# Patient Record
Sex: MALE | Race: WHITE | Employment: OTHER | URBAN - METROPOLITAN AREA
[De-identification: names, ages, dates, MRNs, and addresses within clinical notes are randomized per-mention and may not be internally consistent; named-entity substitution may affect disease eponyms.]

---

## 2017-12-02 PROBLEM — F17.200 TOBACCO DEPENDENCE: Status: ACTIVE | Noted: 2017-12-02

## 2017-12-02 PROBLEM — Z95.828 HX OF ENDOVASCULAR STENT GRAFT FOR ABDOMINAL AORTIC ANEURYSM: Status: ACTIVE | Noted: 2017-12-02

## 2017-12-02 PROBLEM — R09.89 BILATERAL CAROTID BRUITS: Status: ACTIVE | Noted: 2017-12-02

## 2017-12-02 PROBLEM — I70.209 FEMORAL-POPLITEAL ATHEROSCLEROSIS (HCC): Status: ACTIVE | Noted: 2017-12-02

## 2018-01-06 PROBLEM — N40.0 BPH (BENIGN PROSTATIC HYPERPLASIA): Status: ACTIVE | Noted: 2018-01-06

## 2018-01-10 PROBLEM — Z87.438 HISTORY OF BPH: Status: ACTIVE | Noted: 2018-01-10

## 2018-04-10 ENCOUNTER — HOSPITAL ENCOUNTER (INPATIENT)
Age: 78
LOS: 5 days | Discharge: HOME | DRG: 194 | End: 2018-04-15
Attending: EMERGENCY MEDICINE | Admitting: INTERNAL MEDICINE
Payer: MEDICARE

## 2018-04-10 ENCOUNTER — APPOINTMENT (OUTPATIENT)
Dept: CT IMAGING | Age: 78
DRG: 194 | End: 2018-04-10
Payer: MEDICARE

## 2018-04-10 ENCOUNTER — APPOINTMENT (OUTPATIENT)
Dept: GENERAL RADIOLOGY | Age: 78
DRG: 194 | End: 2018-04-10
Payer: MEDICARE

## 2018-04-10 DIAGNOSIS — J18.9 COMMUNITY ACQUIRED PNEUMONIA OF LEFT LOWER LOBE OF LUNG: Primary | ICD-10-CM

## 2018-04-10 DIAGNOSIS — J10.1 INFLUENZA B: ICD-10-CM

## 2018-04-10 LAB
ALBUMIN SERPL-MCNC: 3.8 G/DL (ref 3.5–5.2)
ALP BLD-CCNC: 90 U/L (ref 40–129)
ALT SERPL-CCNC: 13 U/L (ref 0–40)
ANION GAP SERPL CALCULATED.3IONS-SCNC: 15 MMOL/L (ref 7–16)
AST SERPL-CCNC: 18 U/L (ref 0–39)
BASOPHILS ABSOLUTE: 0.11 E9/L (ref 0–0.2)
BASOPHILS RELATIVE PERCENT: 0.5 % (ref 0–2)
BILIRUB SERPL-MCNC: 0.9 MG/DL (ref 0–1.2)
BUN BLDV-MCNC: 13 MG/DL (ref 8–23)
CALCIUM SERPL-MCNC: 9.2 MG/DL (ref 8.6–10.2)
CHLORIDE BLD-SCNC: 96 MMOL/L (ref 98–107)
CO2: 23 MMOL/L (ref 22–29)
CREAT SERPL-MCNC: 0.9 MG/DL (ref 0.7–1.2)
EOSINOPHILS ABSOLUTE: 0.03 E9/L (ref 0.05–0.5)
EOSINOPHILS RELATIVE PERCENT: 0.1 % (ref 0–6)
FILM ARRAY ADENOVIRUS: ABNORMAL
FILM ARRAY BORDETELLA PERTUSSIS: ABNORMAL
FILM ARRAY CHLAMYDOPHILIA PNEUMONIAE: ABNORMAL
FILM ARRAY CORONAVIRUS 229E: ABNORMAL
FILM ARRAY CORONAVIRUS HKU1: ABNORMAL
FILM ARRAY CORONAVIRUS NL63: ABNORMAL
FILM ARRAY CORONAVIRUS OC43: ABNORMAL
FILM ARRAY INFLUENZA A VIRUS 09H1: ABNORMAL
FILM ARRAY INFLUENZA A VIRUS H1: ABNORMAL
FILM ARRAY INFLUENZA A VIRUS H3: ABNORMAL
FILM ARRAY INFLUENZA A VIRUS: ABNORMAL
FILM ARRAY INFLUENZA B: ABNORMAL
FILM ARRAY MYCOPLASMA PNEUMONIAE: ABNORMAL
FILM ARRAY PARAINFLUENZA VIRUS 1: ABNORMAL
FILM ARRAY PARAINFLUENZA VIRUS 2: ABNORMAL
FILM ARRAY PARAINFLUENZA VIRUS 3: ABNORMAL
FILM ARRAY PARAINFLUENZA VIRUS 4: ABNORMAL
FILM ARRAY RESPIRATORY SYNCITIAL VIRUS: ABNORMAL
FILM ARRAY RHINOVIRUS/ENTEROVIRUS: ABNORMAL
GFR AFRICAN AMERICAN: >60
GFR NON-AFRICAN AMERICAN: >60 ML/MIN/1.73
GLUCOSE BLD-MCNC: 110 MG/DL (ref 74–109)
HCT VFR BLD CALC: 48.7 % (ref 37–54)
HEMOGLOBIN: 16.2 G/DL (ref 12.5–16.5)
IMMATURE GRANULOCYTES #: 0.15 E9/L
IMMATURE GRANULOCYTES %: 0.7 % (ref 0–5)
INFLUENZA A BY PCR: NOT DETECTED
INFLUENZA B BY PCR: DETECTED
LACTIC ACID: 0.9 MMOL/L (ref 0.5–2.2)
LACTIC ACID: 1.3 MMOL/L (ref 0.5–2.2)
LYMPHOCYTES ABSOLUTE: 1.8 E9/L (ref 1.5–4)
LYMPHOCYTES RELATIVE PERCENT: 8 % (ref 20–42)
MCH RBC QN AUTO: 29.1 PG (ref 26–35)
MCHC RBC AUTO-ENTMCNC: 33.3 % (ref 32–34.5)
MCV RBC AUTO: 87.4 FL (ref 80–99.9)
MONOCYTES ABSOLUTE: 1.25 E9/L (ref 0.1–0.95)
MONOCYTES RELATIVE PERCENT: 5.6 % (ref 2–12)
NEUTROPHILS ABSOLUTE: 19.06 E9/L (ref 1.8–7.3)
NEUTROPHILS RELATIVE PERCENT: 85.1 % (ref 43–80)
ORGANISM: ABNORMAL
PDW BLD-RTO: 14.9 FL (ref 11.5–15)
PLATELET # BLD: 245 E9/L (ref 130–450)
PMV BLD AUTO: 9.6 FL (ref 7–12)
POTASSIUM SERPL-SCNC: 4.4 MMOL/L (ref 3.5–5)
PRO-BNP: 309 PG/ML (ref 0–450)
RBC # BLD: 5.57 E12/L (ref 3.8–5.8)
SODIUM BLD-SCNC: 134 MMOL/L (ref 132–146)
TOTAL PROTEIN: 7.3 G/DL (ref 6.4–8.3)
TROPONIN: <0.01 NG/ML (ref 0–0.03)
WBC # BLD: 22.4 E9/L (ref 4.5–11.5)

## 2018-04-10 PROCEDURE — 6370000000 HC RX 637 (ALT 250 FOR IP): Performed by: INTERNAL MEDICINE

## 2018-04-10 PROCEDURE — 87503 INFLUENZA DNA AMP PROB ADDL: CPT

## 2018-04-10 PROCEDURE — 96365 THER/PROPH/DIAG IV INF INIT: CPT

## 2018-04-10 PROCEDURE — 71275 CT ANGIOGRAPHY CHEST: CPT

## 2018-04-10 PROCEDURE — 87502 INFLUENZA DNA AMP PROBE: CPT

## 2018-04-10 PROCEDURE — 80053 COMPREHEN METABOLIC PANEL: CPT

## 2018-04-10 PROCEDURE — 2140000000 HC CCU INTERMEDIATE R&B

## 2018-04-10 PROCEDURE — 6360000002 HC RX W HCPCS: Performed by: NURSE PRACTITIONER

## 2018-04-10 PROCEDURE — 6370000000 HC RX 637 (ALT 250 FOR IP): Performed by: NURSE PRACTITIONER

## 2018-04-10 PROCEDURE — 85025 COMPLETE CBC W/AUTO DIFF WBC: CPT

## 2018-04-10 PROCEDURE — 84484 ASSAY OF TROPONIN QUANT: CPT

## 2018-04-10 PROCEDURE — 71046 X-RAY EXAM CHEST 2 VIEWS: CPT

## 2018-04-10 PROCEDURE — 93005 ELECTROCARDIOGRAM TRACING: CPT | Performed by: PHYSICIAN ASSISTANT

## 2018-04-10 PROCEDURE — 96367 TX/PROPH/DG ADDL SEQ IV INF: CPT

## 2018-04-10 PROCEDURE — 83605 ASSAY OF LACTIC ACID: CPT

## 2018-04-10 PROCEDURE — 99285 EMERGENCY DEPT VISIT HI MDM: CPT

## 2018-04-10 PROCEDURE — 2580000003 HC RX 258: Performed by: NURSE PRACTITIONER

## 2018-04-10 PROCEDURE — 87581 M.PNEUMON DNA AMP PROBE: CPT

## 2018-04-10 PROCEDURE — 94664 DEMO&/EVAL PT USE INHALER: CPT

## 2018-04-10 PROCEDURE — 87040 BLOOD CULTURE FOR BACTERIA: CPT

## 2018-04-10 PROCEDURE — 36415 COLL VENOUS BLD VENIPUNCTURE: CPT

## 2018-04-10 PROCEDURE — 87501 INFLUENZA DNA AMP PROB 1+: CPT

## 2018-04-10 PROCEDURE — 87486 CHLMYD PNEUM DNA AMP PROBE: CPT

## 2018-04-10 PROCEDURE — 83880 ASSAY OF NATRIURETIC PEPTIDE: CPT

## 2018-04-10 PROCEDURE — 6360000004 HC RX CONTRAST MEDICATION: Performed by: RADIOLOGY

## 2018-04-10 PROCEDURE — 87798 DETECT AGENT NOS DNA AMP: CPT

## 2018-04-10 PROCEDURE — 2580000003 HC RX 258: Performed by: INTERNAL MEDICINE

## 2018-04-10 RX ORDER — IPRATROPIUM BROMIDE AND ALBUTEROL SULFATE 2.5; .5 MG/3ML; MG/3ML
1 SOLUTION RESPIRATORY (INHALATION) ONCE
Status: COMPLETED | OUTPATIENT
Start: 2018-04-10 | End: 2018-04-10

## 2018-04-10 RX ORDER — ACETAMINOPHEN 325 MG/1
650 TABLET ORAL EVERY 4 HOURS PRN
Status: DISCONTINUED | OUTPATIENT
Start: 2018-04-10 | End: 2018-04-15 | Stop reason: HOSPADM

## 2018-04-10 RX ORDER — ATORVASTATIN CALCIUM 10 MG/1
10 TABLET, FILM COATED ORAL NIGHTLY
Status: DISCONTINUED | OUTPATIENT
Start: 2018-04-10 | End: 2018-04-15 | Stop reason: HOSPADM

## 2018-04-10 RX ORDER — SODIUM CHLORIDE 0.9 % (FLUSH) 0.9 %
10 SYRINGE (ML) INJECTION EVERY 12 HOURS SCHEDULED
Status: DISCONTINUED | OUTPATIENT
Start: 2018-04-10 | End: 2018-04-15 | Stop reason: HOSPADM

## 2018-04-10 RX ORDER — CLOPIDOGREL BISULFATE 75 MG/1
75 TABLET ORAL DAILY
Status: DISCONTINUED | OUTPATIENT
Start: 2018-04-10 | End: 2018-04-15 | Stop reason: HOSPADM

## 2018-04-10 RX ORDER — OSELTAMIVIR PHOSPHATE 75 MG/1
75 CAPSULE ORAL ONCE
Status: COMPLETED | OUTPATIENT
Start: 2018-04-10 | End: 2018-04-10

## 2018-04-10 RX ORDER — OSELTAMIVIR PHOSPHATE 75 MG/1
75 CAPSULE ORAL 2 TIMES DAILY
Status: DISCONTINUED | OUTPATIENT
Start: 2018-04-11 | End: 2018-04-11

## 2018-04-10 RX ORDER — SODIUM CHLORIDE 0.9 % (FLUSH) 0.9 %
10 SYRINGE (ML) INJECTION PRN
Status: DISCONTINUED | OUTPATIENT
Start: 2018-04-10 | End: 2018-04-15 | Stop reason: HOSPADM

## 2018-04-10 RX ORDER — IPRATROPIUM BROMIDE AND ALBUTEROL SULFATE 2.5; .5 MG/3ML; MG/3ML
1 SOLUTION RESPIRATORY (INHALATION)
Status: DISCONTINUED | OUTPATIENT
Start: 2018-04-11 | End: 2018-04-15 | Stop reason: HOSPADM

## 2018-04-10 RX ORDER — CILOSTAZOL 100 MG/1
100 TABLET ORAL 2 TIMES DAILY
Status: DISCONTINUED | OUTPATIENT
Start: 2018-04-10 | End: 2018-04-15 | Stop reason: HOSPADM

## 2018-04-10 RX ORDER — ASPIRIN 81 MG/1
81 TABLET ORAL DAILY
Status: DISCONTINUED | OUTPATIENT
Start: 2018-04-11 | End: 2018-04-15 | Stop reason: HOSPADM

## 2018-04-10 RX ORDER — CLOPIDOGREL BISULFATE 75 MG/1
75 TABLET ORAL DAILY
COMMUNITY
End: 2018-06-07

## 2018-04-10 RX ORDER — ONDANSETRON 2 MG/ML
4 INJECTION INTRAMUSCULAR; INTRAVENOUS EVERY 6 HOURS PRN
Status: DISCONTINUED | OUTPATIENT
Start: 2018-04-10 | End: 2018-04-15 | Stop reason: HOSPADM

## 2018-04-10 RX ORDER — SODIUM CHLORIDE 9 MG/ML
INJECTION, SOLUTION INTRAVENOUS CONTINUOUS
Status: DISCONTINUED | OUTPATIENT
Start: 2018-04-10 | End: 2018-04-13

## 2018-04-10 RX ORDER — CILOSTAZOL 100 MG/1
100 TABLET ORAL 2 TIMES DAILY
COMMUNITY
End: 2018-06-07

## 2018-04-10 RX ADMIN — SODIUM CHLORIDE: 9 INJECTION, SOLUTION INTRAVENOUS at 21:39

## 2018-04-10 RX ADMIN — CILOSTAZOL 100 MG: 100 TABLET ORAL at 21:39

## 2018-04-10 RX ADMIN — CEFTRIAXONE SODIUM 1 G: 1 INJECTION, POWDER, FOR SOLUTION INTRAMUSCULAR; INTRAVENOUS at 15:50

## 2018-04-10 RX ADMIN — AZITHROMYCIN MONOHYDRATE 500 MG: 500 INJECTION, POWDER, LYOPHILIZED, FOR SOLUTION INTRAVENOUS at 16:48

## 2018-04-10 RX ADMIN — SODIUM CHLORIDE 1000 ML: 9 INJECTION, SOLUTION INTRAVENOUS at 15:40

## 2018-04-10 RX ADMIN — IPRATROPIUM BROMIDE AND ALBUTEROL SULFATE 1 AMPULE: 2.5; .5 SOLUTION RESPIRATORY (INHALATION) at 15:45

## 2018-04-10 RX ADMIN — Medication 10 ML: at 21:39

## 2018-04-10 RX ADMIN — ATORVASTATIN CALCIUM 10 MG: 10 TABLET, FILM COATED ORAL at 21:39

## 2018-04-10 RX ADMIN — IOPAMIDOL 60 ML: 755 INJECTION, SOLUTION INTRAVENOUS at 16:31

## 2018-04-10 RX ADMIN — OSELTAMIVIR PHOSPHATE 75 MG: 75 CAPSULE ORAL at 16:53

## 2018-04-10 RX ADMIN — CLOPIDOGREL 75 MG: 75 TABLET, FILM COATED ORAL at 21:39

## 2018-04-10 ASSESSMENT — PAIN SCALES - GENERAL: PAINLEVEL_OUTOF10: 5

## 2018-04-10 ASSESSMENT — PAIN DESCRIPTION - ORIENTATION: ORIENTATION: RIGHT;LEFT

## 2018-04-10 ASSESSMENT — PAIN DESCRIPTION - LOCATION: LOCATION: ABDOMEN

## 2018-04-11 PROBLEM — F17.200 TOBACCO DEPENDENCE: Chronic | Status: ACTIVE | Noted: 2017-12-02

## 2018-04-11 PROBLEM — R09.89 BILATERAL CAROTID BRUITS: Status: RESOLVED | Noted: 2017-12-02 | Resolved: 2018-04-11

## 2018-04-11 PROBLEM — I71.20 THORACIC AORTIC ANEURYSM: Status: ACTIVE | Noted: 2018-04-11

## 2018-04-11 PROBLEM — J10.1 INFLUENZA B: Status: ACTIVE | Noted: 2018-04-11

## 2018-04-11 PROBLEM — J44.9 COPD (CHRONIC OBSTRUCTIVE PULMONARY DISEASE) (HCC): Chronic | Status: ACTIVE | Noted: 2018-04-11

## 2018-04-11 PROBLEM — B34.8 INFECTION DUE TO HUMAN METAPNEUMOVIRUS (HMPV): Status: ACTIVE | Noted: 2018-04-11

## 2018-04-11 PROBLEM — E78.5 HYPERLIPIDEMIA: Chronic | Status: ACTIVE | Noted: 2018-04-11

## 2018-04-11 LAB
CEA: 2.7 NG/ML (ref 0–5.2)
EKG ATRIAL RATE: 107 BPM
EKG P AXIS: 36 DEGREES
EKG P-R INTERVAL: 154 MS
EKG Q-T INTERVAL: 346 MS
EKG QRS DURATION: 84 MS
EKG QTC CALCULATION (BAZETT): 461 MS
EKG R AXIS: -51 DEGREES
EKG T AXIS: 28 DEGREES
EKG VENTRICULAR RATE: 107 BPM

## 2018-04-11 PROCEDURE — 2580000003 HC RX 258: Performed by: INTERNAL MEDICINE

## 2018-04-11 PROCEDURE — 94640 AIRWAY INHALATION TREATMENT: CPT

## 2018-04-11 PROCEDURE — 2700000000 HC OXYGEN THERAPY PER DAY

## 2018-04-11 PROCEDURE — 99222 1ST HOSP IP/OBS MODERATE 55: CPT | Performed by: THORACIC SURGERY (CARDIOTHORACIC VASCULAR SURGERY)

## 2018-04-11 PROCEDURE — 6370000000 HC RX 637 (ALT 250 FOR IP): Performed by: INTERNAL MEDICINE

## 2018-04-11 PROCEDURE — 87450 HC DIRECT STREP B ANTIGEN: CPT

## 2018-04-11 PROCEDURE — 6360000002 HC RX W HCPCS: Performed by: INTERNAL MEDICINE

## 2018-04-11 PROCEDURE — 93010 ELECTROCARDIOGRAM REPORT: CPT | Performed by: INTERNAL MEDICINE

## 2018-04-11 PROCEDURE — 93005 ELECTROCARDIOGRAM TRACING: CPT | Performed by: INTERNAL MEDICINE

## 2018-04-11 PROCEDURE — 2140000000 HC CCU INTERMEDIATE R&B

## 2018-04-11 PROCEDURE — 87070 CULTURE OTHR SPECIMN AEROBIC: CPT

## 2018-04-11 PROCEDURE — 82378 CARCINOEMBRYONIC ANTIGEN: CPT

## 2018-04-11 PROCEDURE — 87205 SMEAR GRAM STAIN: CPT

## 2018-04-11 PROCEDURE — 36415 COLL VENOUS BLD VENIPUNCTURE: CPT

## 2018-04-11 RX ORDER — OSELTAMIVIR PHOSPHATE 75 MG/1
75 CAPSULE ORAL 2 TIMES DAILY
Status: DISCONTINUED | OUTPATIENT
Start: 2018-04-11 | End: 2018-04-15 | Stop reason: HOSPADM

## 2018-04-11 RX ORDER — METHYLPREDNISOLONE SODIUM SUCCINATE 40 MG/ML
40 INJECTION, POWDER, LYOPHILIZED, FOR SOLUTION INTRAMUSCULAR; INTRAVENOUS EVERY 6 HOURS
Status: DISCONTINUED | OUTPATIENT
Start: 2018-04-11 | End: 2018-04-12

## 2018-04-11 RX ADMIN — METHYLPREDNISOLONE SODIUM SUCCINATE 40 MG: 40 INJECTION, POWDER, FOR SOLUTION INTRAMUSCULAR; INTRAVENOUS at 12:07

## 2018-04-11 RX ADMIN — ATORVASTATIN CALCIUM 10 MG: 10 TABLET, FILM COATED ORAL at 20:59

## 2018-04-11 RX ADMIN — ENOXAPARIN SODIUM 90 MG: 100 INJECTION SUBCUTANEOUS at 21:10

## 2018-04-11 RX ADMIN — CEFTRIAXONE SODIUM 1 G: 1 INJECTION, POWDER, FOR SOLUTION INTRAMUSCULAR; INTRAVENOUS at 09:03

## 2018-04-11 RX ADMIN — CILOSTAZOL 100 MG: 100 TABLET ORAL at 20:59

## 2018-04-11 RX ADMIN — IPRATROPIUM BROMIDE AND ALBUTEROL SULFATE 1 AMPULE: 2.5; .5 SOLUTION RESPIRATORY (INHALATION) at 21:30

## 2018-04-11 RX ADMIN — ENOXAPARIN SODIUM 40 MG: 40 INJECTION SUBCUTANEOUS at 09:50

## 2018-04-11 RX ADMIN — OSELTAMIVIR PHOSPHATE 75 MG: 75 CAPSULE ORAL at 09:48

## 2018-04-11 RX ADMIN — AZITHROMYCIN MONOHYDRATE 500 MG: 500 INJECTION, POWDER, LYOPHILIZED, FOR SOLUTION INTRAVENOUS at 10:54

## 2018-04-11 RX ADMIN — OSELTAMIVIR PHOSPHATE 75 MG: 75 CAPSULE ORAL at 20:59

## 2018-04-11 RX ADMIN — CLOPIDOGREL 75 MG: 75 TABLET, FILM COATED ORAL at 09:49

## 2018-04-11 RX ADMIN — Medication 10 ML: at 20:59

## 2018-04-11 RX ADMIN — IPRATROPIUM BROMIDE AND ALBUTEROL SULFATE 1 AMPULE: 2.5; .5 SOLUTION RESPIRATORY (INHALATION) at 16:02

## 2018-04-11 RX ADMIN — ASPIRIN 81 MG: 81 TABLET, COATED ORAL at 09:48

## 2018-04-11 RX ADMIN — METHYLPREDNISOLONE SODIUM SUCCINATE 40 MG: 40 INJECTION, POWDER, FOR SOLUTION INTRAMUSCULAR; INTRAVENOUS at 19:05

## 2018-04-11 RX ADMIN — Medication 10 ML: at 23:01

## 2018-04-11 RX ADMIN — CILOSTAZOL 100 MG: 100 TABLET ORAL at 09:50

## 2018-04-11 RX ADMIN — SODIUM CHLORIDE: 9 INJECTION, SOLUTION INTRAVENOUS at 11:06

## 2018-04-11 ASSESSMENT — PAIN SCALES - GENERAL
PAINLEVEL_OUTOF10: 0

## 2018-04-12 ENCOUNTER — APPOINTMENT (OUTPATIENT)
Dept: ULTRASOUND IMAGING | Age: 78
DRG: 194 | End: 2018-04-12
Payer: MEDICARE

## 2018-04-12 ENCOUNTER — APPOINTMENT (OUTPATIENT)
Dept: GENERAL RADIOLOGY | Age: 78
DRG: 194 | End: 2018-04-12
Payer: MEDICARE

## 2018-04-12 PROBLEM — I48.91 ATRIAL FIBRILLATION WITH RVR (HCC): Status: ACTIVE | Noted: 2018-04-12

## 2018-04-12 LAB
AMYLASE FLUID: 16 U/L
ANION GAP SERPL CALCULATED.3IONS-SCNC: 15 MMOL/L (ref 7–16)
BUN BLDV-MCNC: 15 MG/DL (ref 8–23)
CALCIUM SERPL-MCNC: 8.9 MG/DL (ref 8.6–10.2)
CEA,FLUID: 2 NG/ML
CHLORIDE BLD-SCNC: 102 MMOL/L (ref 98–107)
CO2: 25 MMOL/L (ref 22–29)
CREAT SERPL-MCNC: 0.7 MG/DL (ref 0.7–1.2)
CRITICAL: NORMAL
DATE ANALYZED: NORMAL
DATE OF COLLECTION: NORMAL
EKG ATRIAL RATE: 103 BPM
EKG P AXIS: 47 DEGREES
EKG P-R INTERVAL: 144 MS
EKG Q-T INTERVAL: 362 MS
EKG QRS DURATION: 92 MS
EKG QTC CALCULATION (BAZETT): 474 MS
EKG R AXIS: -35 DEGREES
EKG T AXIS: 47 DEGREES
EKG VENTRICULAR RATE: 103 BPM
FLUID TYPE: NORMAL
FLUID TYPE: NORMAL
GFR AFRICAN AMERICAN: >60
GFR NON-AFRICAN AMERICAN: >60 ML/MIN/1.73
GLUCOSE BLD-MCNC: 142 MG/DL (ref 74–109)
GLUCOSE, FLUID: 128 MG/DL
L. PNEUMOPHILA SEROGP 1 UR AG: NORMAL
LAB: NORMAL
LD, FLUID: 586 U/L
LV EF: 70 %
LVEF MODALITY: NORMAL
Lab: 950
MAGNESIUM: 2.1 MG/DL (ref 1.6–2.6)
OPERATOR ID: 2156
PH BLOOD GAS: 7.37
PH, BODY FLUID: 7.41
POTASSIUM SERPL-SCNC: 3.6 MMOL/L (ref 3.5–5)
PROTEIN FLUID: 4.2 G/DL
SODIUM BLD-SCNC: 142 MMOL/L (ref 132–146)
SOURCE, BLOOD GAS: NORMAL
STREP PNEUMONIAE ANTIGEN, URINE: NORMAL
TIME ANALYZED: 1009
TRIGLYCERIDES FLUID: 39 MG/DL
TSH SERPL DL<=0.05 MIU/L-ACNC: 0.52 UIU/ML (ref 0.27–4.2)

## 2018-04-12 PROCEDURE — 36415 COLL VENOUS BLD VENIPUNCTURE: CPT

## 2018-04-12 PROCEDURE — 84478 ASSAY OF TRIGLYCERIDES: CPT

## 2018-04-12 PROCEDURE — 6370000000 HC RX 637 (ALT 250 FOR IP): Performed by: INTERNAL MEDICINE

## 2018-04-12 PROCEDURE — 2140000000 HC CCU INTERMEDIATE R&B

## 2018-04-12 PROCEDURE — 93005 ELECTROCARDIOGRAM TRACING: CPT | Performed by: INTERNAL MEDICINE

## 2018-04-12 PROCEDURE — 2580000003 HC RX 258: Performed by: INTERNAL MEDICINE

## 2018-04-12 PROCEDURE — 82150 ASSAY OF AMYLASE: CPT

## 2018-04-12 PROCEDURE — 84439 ASSAY OF FREE THYROXINE: CPT

## 2018-04-12 PROCEDURE — 82378 CARCINOEMBRYONIC ANTIGEN: CPT

## 2018-04-12 PROCEDURE — 2700000000 HC OXYGEN THERAPY PER DAY

## 2018-04-12 PROCEDURE — 6360000002 HC RX W HCPCS: Performed by: INTERNAL MEDICINE

## 2018-04-12 PROCEDURE — 32555 ASPIRATE PLEURA W/ IMAGING: CPT

## 2018-04-12 PROCEDURE — 0W9B3ZX DRAINAGE OF LEFT PLEURAL CAVITY, PERCUTANEOUS APPROACH, DIAGNOSTIC: ICD-10-PCS | Performed by: INTERNAL MEDICINE

## 2018-04-12 PROCEDURE — 99223 1ST HOSP IP/OBS HIGH 75: CPT | Performed by: INTERNAL MEDICINE

## 2018-04-12 PROCEDURE — 83615 LACTATE (LD) (LDH) ENZYME: CPT

## 2018-04-12 PROCEDURE — 83986 ASSAY PH BODY FLUID NOS: CPT

## 2018-04-12 PROCEDURE — 2500000003 HC RX 250 WO HCPCS

## 2018-04-12 PROCEDURE — 82947 ASSAY GLUCOSE BLOOD QUANT: CPT

## 2018-04-12 PROCEDURE — 84443 ASSAY THYROID STIM HORMONE: CPT

## 2018-04-12 PROCEDURE — 94640 AIRWAY INHALATION TREATMENT: CPT

## 2018-04-12 PROCEDURE — 71046 X-RAY EXAM CHEST 2 VIEWS: CPT

## 2018-04-12 PROCEDURE — 80048 BASIC METABOLIC PNL TOTAL CA: CPT

## 2018-04-12 PROCEDURE — 93306 TTE W/DOPPLER COMPLETE: CPT

## 2018-04-12 PROCEDURE — 88112 CYTOPATH CELL ENHANCE TECH: CPT

## 2018-04-12 PROCEDURE — 88305 TISSUE EXAM BY PATHOLOGIST: CPT

## 2018-04-12 PROCEDURE — 83735 ASSAY OF MAGNESIUM: CPT

## 2018-04-12 PROCEDURE — 93010 ELECTROCARDIOGRAM REPORT: CPT | Performed by: INTERNAL MEDICINE

## 2018-04-12 PROCEDURE — APPSS60 APP SPLIT SHARED TIME 46-60 MINUTES: Performed by: NURSE PRACTITIONER

## 2018-04-12 PROCEDURE — 84157 ASSAY OF PROTEIN OTHER: CPT

## 2018-04-12 RX ORDER — LIDOCAINE HYDROCHLORIDE 10 MG/ML
INJECTION, SOLUTION EPIDURAL; INFILTRATION; INTRACAUDAL; PERINEURAL
Status: COMPLETED
Start: 2018-04-12 | End: 2018-04-12

## 2018-04-12 RX ORDER — METHYLPREDNISOLONE SODIUM SUCCINATE 40 MG/ML
40 INJECTION, POWDER, LYOPHILIZED, FOR SOLUTION INTRAMUSCULAR; INTRAVENOUS EVERY 12 HOURS
Status: COMPLETED | OUTPATIENT
Start: 2018-04-12 | End: 2018-04-13

## 2018-04-12 RX ADMIN — IPRATROPIUM BROMIDE AND ALBUTEROL SULFATE 1 AMPULE: 2.5; .5 SOLUTION RESPIRATORY (INHALATION) at 12:24

## 2018-04-12 RX ADMIN — OSELTAMIVIR PHOSPHATE 75 MG: 75 CAPSULE ORAL at 10:01

## 2018-04-12 RX ADMIN — SODIUM CHLORIDE: 9 INJECTION, SOLUTION INTRAVENOUS at 01:00

## 2018-04-12 RX ADMIN — CILOSTAZOL 100 MG: 100 TABLET ORAL at 10:20

## 2018-04-12 RX ADMIN — CLOPIDOGREL 75 MG: 75 TABLET, FILM COATED ORAL at 10:20

## 2018-04-12 RX ADMIN — CILOSTAZOL 100 MG: 100 TABLET ORAL at 22:20

## 2018-04-12 RX ADMIN — ATORVASTATIN CALCIUM 10 MG: 10 TABLET, FILM COATED ORAL at 22:20

## 2018-04-12 RX ADMIN — IPRATROPIUM BROMIDE AND ALBUTEROL SULFATE 1 AMPULE: 2.5; .5 SOLUTION RESPIRATORY (INHALATION) at 20:07

## 2018-04-12 RX ADMIN — OSELTAMIVIR PHOSPHATE 75 MG: 75 CAPSULE ORAL at 22:20

## 2018-04-12 RX ADMIN — METHYLPREDNISOLONE SODIUM SUCCINATE 40 MG: 40 INJECTION, POWDER, FOR SOLUTION INTRAMUSCULAR; INTRAVENOUS at 01:00

## 2018-04-12 RX ADMIN — LIDOCAINE HYDROCHLORIDE: 10 INJECTION, SOLUTION EPIDURAL; INFILTRATION; INTRACAUDAL; PERINEURAL at 10:01

## 2018-04-12 RX ADMIN — CEFTRIAXONE SODIUM 1 G: 1 INJECTION, POWDER, FOR SOLUTION INTRAMUSCULAR; INTRAVENOUS at 08:51

## 2018-04-12 RX ADMIN — SODIUM CHLORIDE: 9 INJECTION, SOLUTION INTRAVENOUS at 14:45

## 2018-04-12 RX ADMIN — METHYLPREDNISOLONE SODIUM SUCCINATE 40 MG: 40 INJECTION, POWDER, LYOPHILIZED, FOR SOLUTION INTRAMUSCULAR; INTRAVENOUS at 18:42

## 2018-04-12 RX ADMIN — METHYLPREDNISOLONE SODIUM SUCCINATE 40 MG: 40 INJECTION, POWDER, FOR SOLUTION INTRAMUSCULAR; INTRAVENOUS at 04:58

## 2018-04-12 RX ADMIN — MAGNESIUM HYDROXIDE 30 ML: 400 SUSPENSION ORAL at 22:22

## 2018-04-12 RX ADMIN — ASPIRIN 81 MG: 81 TABLET, COATED ORAL at 10:20

## 2018-04-12 RX ADMIN — IPRATROPIUM BROMIDE AND ALBUTEROL SULFATE 1 AMPULE: 2.5; .5 SOLUTION RESPIRATORY (INHALATION) at 15:53

## 2018-04-12 RX ADMIN — Medication 10 ML: at 18:43

## 2018-04-12 RX ADMIN — AZITHROMYCIN MONOHYDRATE 500 MG: 500 INJECTION, POWDER, LYOPHILIZED, FOR SOLUTION INTRAVENOUS at 11:11

## 2018-04-12 ASSESSMENT — PAIN SCALES - GENERAL
PAINLEVEL_OUTOF10: 0

## 2018-04-13 LAB — T4 FREE: 1.25 NG/DL (ref 0.93–1.7)

## 2018-04-13 PROCEDURE — 6370000000 HC RX 637 (ALT 250 FOR IP): Performed by: INTERNAL MEDICINE

## 2018-04-13 PROCEDURE — 99232 SBSQ HOSP IP/OBS MODERATE 35: CPT | Performed by: INTERNAL MEDICINE

## 2018-04-13 PROCEDURE — 2700000000 HC OXYGEN THERAPY PER DAY

## 2018-04-13 PROCEDURE — 6360000002 HC RX W HCPCS: Performed by: INTERNAL MEDICINE

## 2018-04-13 PROCEDURE — 2140000000 HC CCU INTERMEDIATE R&B

## 2018-04-13 PROCEDURE — 2580000003 HC RX 258: Performed by: INTERNAL MEDICINE

## 2018-04-13 PROCEDURE — 94640 AIRWAY INHALATION TREATMENT: CPT

## 2018-04-13 PROCEDURE — 2580000003 HC RX 258

## 2018-04-13 RX ORDER — PREDNISONE 10 MG/1
TABLET ORAL
Qty: 30 TABLET | Refills: 0 | Status: SHIPPED | OUTPATIENT
Start: 2018-04-14 | End: 2018-05-29 | Stop reason: ALTCHOICE

## 2018-04-13 RX ORDER — PREDNISONE 20 MG/1
40 TABLET ORAL DAILY
Status: DISCONTINUED | OUTPATIENT
Start: 2018-04-14 | End: 2018-04-15 | Stop reason: HOSPADM

## 2018-04-13 RX ORDER — DIGOXIN 0.25 MG/ML
500 INJECTION INTRAMUSCULAR; INTRAVENOUS ONCE
Status: COMPLETED | OUTPATIENT
Start: 2018-04-13 | End: 2018-04-13

## 2018-04-13 RX ORDER — PREDNISONE 10 MG/1
10 TABLET ORAL DAILY
Status: DISCONTINUED | OUTPATIENT
Start: 2018-04-23 | End: 2018-04-15 | Stop reason: HOSPADM

## 2018-04-13 RX ORDER — PREDNISONE 20 MG/1
20 TABLET ORAL DAILY
Status: DISCONTINUED | OUTPATIENT
Start: 2018-04-20 | End: 2018-04-15 | Stop reason: HOSPADM

## 2018-04-13 RX ORDER — DIGOXIN 0.25 MG/ML
250 INJECTION INTRAMUSCULAR; INTRAVENOUS ONCE
Status: DISCONTINUED | OUTPATIENT
Start: 2018-04-13 | End: 2018-04-13

## 2018-04-13 RX ADMIN — WATER 1 ML: 1 INJECTION INTRAMUSCULAR; INTRAVENOUS; SUBCUTANEOUS at 16:37

## 2018-04-13 RX ADMIN — DIGOXIN 500 MCG: 0.25 INJECTION INTRAMUSCULAR; INTRAVENOUS at 02:12

## 2018-04-13 RX ADMIN — ENOXAPARIN SODIUM 30 MG: 30 INJECTION SUBCUTANEOUS at 09:09

## 2018-04-13 RX ADMIN — IPRATROPIUM BROMIDE AND ALBUTEROL SULFATE 1 AMPULE: 2.5; .5 SOLUTION RESPIRATORY (INHALATION) at 16:42

## 2018-04-13 RX ADMIN — OSELTAMIVIR PHOSPHATE 75 MG: 75 CAPSULE ORAL at 09:08

## 2018-04-13 RX ADMIN — CILOSTAZOL 100 MG: 100 TABLET ORAL at 09:08

## 2018-04-13 RX ADMIN — Medication 10 ML: at 21:55

## 2018-04-13 RX ADMIN — ATORVASTATIN CALCIUM 10 MG: 10 TABLET, FILM COATED ORAL at 21:55

## 2018-04-13 RX ADMIN — Medication 20 ML: at 02:12

## 2018-04-13 RX ADMIN — METHYLPREDNISOLONE SODIUM SUCCINATE 40 MG: 40 INJECTION, POWDER, LYOPHILIZED, FOR SOLUTION INTRAMUSCULAR; INTRAVENOUS at 05:00

## 2018-04-13 RX ADMIN — AZITHROMYCIN MONOHYDRATE 500 MG: 500 INJECTION, POWDER, LYOPHILIZED, FOR SOLUTION INTRAVENOUS at 10:20

## 2018-04-13 RX ADMIN — OSELTAMIVIR PHOSPHATE 75 MG: 75 CAPSULE ORAL at 21:55

## 2018-04-13 RX ADMIN — IPRATROPIUM BROMIDE AND ALBUTEROL SULFATE 1 AMPULE: 2.5; .5 SOLUTION RESPIRATORY (INHALATION) at 13:27

## 2018-04-13 RX ADMIN — IPRATROPIUM BROMIDE AND ALBUTEROL SULFATE 1 AMPULE: 2.5; .5 SOLUTION RESPIRATORY (INHALATION) at 09:18

## 2018-04-13 RX ADMIN — ASPIRIN 81 MG: 81 TABLET, COATED ORAL at 09:08

## 2018-04-13 RX ADMIN — CILOSTAZOL 100 MG: 100 TABLET ORAL at 21:55

## 2018-04-13 RX ADMIN — SODIUM CHLORIDE: 9 INJECTION, SOLUTION INTRAVENOUS at 02:12

## 2018-04-13 RX ADMIN — CLOPIDOGREL 75 MG: 75 TABLET, FILM COATED ORAL at 09:07

## 2018-04-13 RX ADMIN — METHYLPREDNISOLONE SODIUM SUCCINATE 40 MG: 40 INJECTION, POWDER, LYOPHILIZED, FOR SOLUTION INTRAMUSCULAR; INTRAVENOUS at 16:36

## 2018-04-13 RX ADMIN — CEFTRIAXONE SODIUM 1 G: 1 INJECTION, POWDER, FOR SOLUTION INTRAMUSCULAR; INTRAVENOUS at 09:10

## 2018-04-13 RX ADMIN — Medication 10 ML: at 05:00

## 2018-04-13 RX ADMIN — Medication 10 ML: at 16:37

## 2018-04-13 ASSESSMENT — PAIN SCALES - GENERAL
PAINLEVEL_OUTOF10: 0

## 2018-04-14 LAB
BODY FLUID CULTURE, STERILE: NORMAL
GRAM STAIN RESULT: NORMAL

## 2018-04-14 PROCEDURE — 6360000002 HC RX W HCPCS: Performed by: INTERNAL MEDICINE

## 2018-04-14 PROCEDURE — 6370000000 HC RX 637 (ALT 250 FOR IP): Performed by: INTERNAL MEDICINE

## 2018-04-14 PROCEDURE — 94640 AIRWAY INHALATION TREATMENT: CPT

## 2018-04-14 PROCEDURE — 2700000000 HC OXYGEN THERAPY PER DAY

## 2018-04-14 PROCEDURE — 94760 N-INVAS EAR/PLS OXIMETRY 1: CPT

## 2018-04-14 PROCEDURE — 2580000003 HC RX 258: Performed by: INTERNAL MEDICINE

## 2018-04-14 PROCEDURE — 2140000000 HC CCU INTERMEDIATE R&B

## 2018-04-14 RX ORDER — CALCIUM CARBONATE 200(500)MG
500 TABLET,CHEWABLE ORAL 3 TIMES DAILY PRN
Status: DISCONTINUED | OUTPATIENT
Start: 2018-04-14 | End: 2018-04-15 | Stop reason: HOSPADM

## 2018-04-14 RX ADMIN — Medication 10 ML: at 20:14

## 2018-04-14 RX ADMIN — ASPIRIN 81 MG: 81 TABLET, COATED ORAL at 09:02

## 2018-04-14 RX ADMIN — ENOXAPARIN SODIUM 30 MG: 30 INJECTION SUBCUTANEOUS at 09:02

## 2018-04-14 RX ADMIN — IPRATROPIUM BROMIDE AND ALBUTEROL SULFATE 1 AMPULE: 2.5; .5 SOLUTION RESPIRATORY (INHALATION) at 08:00

## 2018-04-14 RX ADMIN — CILOSTAZOL 100 MG: 100 TABLET ORAL at 09:01

## 2018-04-14 RX ADMIN — PREDNISONE 40 MG: 20 TABLET ORAL at 09:01

## 2018-04-14 RX ADMIN — ATORVASTATIN CALCIUM 10 MG: 10 TABLET, FILM COATED ORAL at 20:14

## 2018-04-14 RX ADMIN — Medication 500 MG: at 20:27

## 2018-04-14 RX ADMIN — OSELTAMIVIR PHOSPHATE 75 MG: 75 CAPSULE ORAL at 20:14

## 2018-04-14 RX ADMIN — IPRATROPIUM BROMIDE AND ALBUTEROL SULFATE 1 AMPULE: 2.5; .5 SOLUTION RESPIRATORY (INHALATION) at 16:47

## 2018-04-14 RX ADMIN — CILOSTAZOL 100 MG: 100 TABLET ORAL at 20:14

## 2018-04-14 RX ADMIN — OSELTAMIVIR PHOSPHATE 75 MG: 75 CAPSULE ORAL at 09:01

## 2018-04-14 RX ADMIN — AZITHROMYCIN MONOHYDRATE 500 MG: 500 INJECTION, POWDER, LYOPHILIZED, FOR SOLUTION INTRAVENOUS at 10:16

## 2018-04-14 RX ADMIN — CEFTRIAXONE SODIUM 1 G: 1 INJECTION, POWDER, FOR SOLUTION INTRAMUSCULAR; INTRAVENOUS at 09:02

## 2018-04-14 RX ADMIN — IPRATROPIUM BROMIDE AND ALBUTEROL SULFATE 1 AMPULE: 2.5; .5 SOLUTION RESPIRATORY (INHALATION) at 11:50

## 2018-04-14 RX ADMIN — Medication 10 ML: at 09:02

## 2018-04-14 RX ADMIN — IPRATROPIUM BROMIDE AND ALBUTEROL SULFATE 1 AMPULE: 2.5; .5 SOLUTION RESPIRATORY (INHALATION) at 19:52

## 2018-04-14 RX ADMIN — CLOPIDOGREL 75 MG: 75 TABLET, FILM COATED ORAL at 09:01

## 2018-04-14 ASSESSMENT — PAIN SCALES - GENERAL
PAINLEVEL_OUTOF10: 0

## 2018-04-15 VITALS
SYSTOLIC BLOOD PRESSURE: 128 MMHG | BODY MASS INDEX: 29 KG/M2 | OXYGEN SATURATION: 94 % | WEIGHT: 195.8 LBS | DIASTOLIC BLOOD PRESSURE: 80 MMHG | HEART RATE: 87 BPM | HEIGHT: 69 IN | RESPIRATION RATE: 18 BRPM | TEMPERATURE: 98.2 F

## 2018-04-15 LAB
BLOOD CULTURE, ROUTINE: NORMAL
CULTURE, BLOOD 2: NORMAL

## 2018-04-15 PROCEDURE — 6360000002 HC RX W HCPCS: Performed by: INTERNAL MEDICINE

## 2018-04-15 PROCEDURE — 6370000000 HC RX 637 (ALT 250 FOR IP): Performed by: INTERNAL MEDICINE

## 2018-04-15 PROCEDURE — 2580000003 HC RX 258: Performed by: INTERNAL MEDICINE

## 2018-04-15 PROCEDURE — 94640 AIRWAY INHALATION TREATMENT: CPT

## 2018-04-15 RX ORDER — OSELTAMIVIR PHOSPHATE 75 MG/1
75 CAPSULE ORAL 2 TIMES DAILY
Qty: 4 CAPSULE | Refills: 0 | Status: SHIPPED | OUTPATIENT
Start: 2018-04-15 | End: 2018-04-17

## 2018-04-15 RX ORDER — AMOXICILLIN AND CLAVULANATE POTASSIUM 875; 125 MG/1; MG/1
1 TABLET, FILM COATED ORAL 2 TIMES DAILY
Qty: 14 TABLET | Refills: 0 | Status: SHIPPED | OUTPATIENT
Start: 2018-04-15 | End: 2018-04-22

## 2018-04-15 RX ADMIN — ASPIRIN 81 MG: 81 TABLET, COATED ORAL at 08:36

## 2018-04-15 RX ADMIN — CEFTRIAXONE SODIUM 1 G: 1 INJECTION, POWDER, FOR SOLUTION INTRAMUSCULAR; INTRAVENOUS at 08:36

## 2018-04-15 RX ADMIN — CLOPIDOGREL 75 MG: 75 TABLET, FILM COATED ORAL at 08:35

## 2018-04-15 RX ADMIN — CILOSTAZOL 100 MG: 100 TABLET ORAL at 08:35

## 2018-04-15 RX ADMIN — Medication 10 ML: at 08:36

## 2018-04-15 RX ADMIN — PREDNISONE 40 MG: 20 TABLET ORAL at 08:35

## 2018-04-15 RX ADMIN — AZITHROMYCIN MONOHYDRATE 500 MG: 500 INJECTION, POWDER, LYOPHILIZED, FOR SOLUTION INTRAVENOUS at 10:07

## 2018-04-15 RX ADMIN — IPRATROPIUM BROMIDE AND ALBUTEROL SULFATE 1 AMPULE: 2.5; .5 SOLUTION RESPIRATORY (INHALATION) at 07:49

## 2018-04-15 RX ADMIN — OSELTAMIVIR PHOSPHATE 75 MG: 75 CAPSULE ORAL at 08:35

## 2018-04-15 RX ADMIN — ENOXAPARIN SODIUM 30 MG: 30 INJECTION SUBCUTANEOUS at 08:36

## 2018-04-15 ASSESSMENT — PAIN SCALES - GENERAL
PAINLEVEL_OUTOF10: 0

## 2018-04-17 LAB
EKG ATRIAL RATE: 104 BPM
EKG P AXIS: 60 DEGREES
EKG P-R INTERVAL: 150 MS
EKG Q-T INTERVAL: 324 MS
EKG QRS DURATION: 80 MS
EKG QTC CALCULATION (BAZETT): 426 MS
EKG R AXIS: -136 DEGREES
EKG T AXIS: 25 DEGREES
EKG VENTRICULAR RATE: 104 BPM

## 2018-04-18 ENCOUNTER — TELEPHONE (OUTPATIENT)
Dept: CARDIOTHORACIC SURGERY | Age: 78
End: 2018-04-18

## 2018-05-11 ENCOUNTER — HOSPITAL ENCOUNTER (OUTPATIENT)
Age: 78
Discharge: HOME | End: 2018-05-13
Payer: MEDICARE

## 2018-05-11 PROCEDURE — 88112 CYTOPATH CELL ENHANCE TECH: CPT

## 2018-06-01 ENCOUNTER — HOSPITAL ENCOUNTER (OUTPATIENT)
Dept: CT IMAGING | Age: 78
Discharge: HOME | End: 2018-06-03
Payer: MEDICARE

## 2018-06-01 DIAGNOSIS — J90 PLEURAL EFFUSION: ICD-10-CM

## 2018-06-01 PROCEDURE — 71250 CT THORAX DX C-: CPT

## 2018-06-05 ENCOUNTER — PREP FOR PROCEDURE (OUTPATIENT)
Dept: CARDIOTHORACIC SURGERY | Age: 78
End: 2018-06-05

## 2018-06-05 ENCOUNTER — INITIAL CONSULT (OUTPATIENT)
Dept: CARDIOTHORACIC SURGERY | Age: 78
End: 2018-06-05
Payer: MEDICARE

## 2018-06-05 VITALS
BODY MASS INDEX: 29.4 KG/M2 | HEART RATE: 85 BPM | HEIGHT: 68 IN | SYSTOLIC BLOOD PRESSURE: 122 MMHG | DIASTOLIC BLOOD PRESSURE: 73 MMHG | WEIGHT: 194 LBS

## 2018-06-05 DIAGNOSIS — J90 RECURRENT LEFT PLEURAL EFFUSION: Primary | ICD-10-CM

## 2018-06-05 PROCEDURE — 99213 OFFICE O/P EST LOW 20 MIN: CPT | Performed by: THORACIC SURGERY (CARDIOTHORACIC VASCULAR SURGERY)

## 2018-06-05 RX ORDER — CHLORHEXIDINE GLUCONATE 0.12 MG/ML
15 RINSE ORAL ONCE
Status: CANCELLED | OUTPATIENT
Start: 2018-06-05 | End: 2018-06-05

## 2018-06-05 RX ORDER — SODIUM CHLORIDE 0.9 % (FLUSH) 0.9 %
10 SYRINGE (ML) INJECTION PRN
Status: CANCELLED | OUTPATIENT
Start: 2018-06-05

## 2018-06-05 RX ORDER — SODIUM CHLORIDE 9 MG/ML
INJECTION, SOLUTION INTRAVENOUS CONTINUOUS
Status: CANCELLED | OUTPATIENT
Start: 2018-06-05

## 2018-06-05 RX ORDER — SODIUM CHLORIDE 0.9 % (FLUSH) 0.9 %
10 SYRINGE (ML) INJECTION EVERY 12 HOURS SCHEDULED
Status: CANCELLED | OUTPATIENT
Start: 2018-06-05

## 2018-06-05 RX ORDER — CHLORHEXIDINE GLUCONATE 4 G/100ML
SOLUTION TOPICAL ONCE
Status: CANCELLED | OUTPATIENT
Start: 2018-06-05 | End: 2018-06-05

## 2018-06-05 ASSESSMENT — ENCOUNTER SYMPTOMS
SHORTNESS OF BREATH: 1
ABDOMINAL PAIN: 0

## 2018-06-06 ENCOUNTER — TELEPHONE (OUTPATIENT)
Dept: CARDIOTHORACIC SURGERY | Age: 78
End: 2018-06-06

## 2018-06-07 ENCOUNTER — HOSPITAL ENCOUNTER (OUTPATIENT)
Dept: PREADMISSION TESTING | Age: 78
Discharge: HOME | End: 2018-06-07
Payer: MEDICARE

## 2018-06-07 ENCOUNTER — ANESTHESIA EVENT (OUTPATIENT)
Dept: OPERATING ROOM | Age: 78
DRG: 168 | End: 2018-06-07
Payer: MEDICARE

## 2018-06-07 VITALS
DIASTOLIC BLOOD PRESSURE: 73 MMHG | OXYGEN SATURATION: 97 % | RESPIRATION RATE: 12 BRPM | SYSTOLIC BLOOD PRESSURE: 118 MMHG | HEART RATE: 87 BPM | HEIGHT: 69 IN | TEMPERATURE: 97.9 F | WEIGHT: 194 LBS | BODY MASS INDEX: 28.73 KG/M2

## 2018-06-07 DIAGNOSIS — J90 RECURRENT LEFT PLEURAL EFFUSION: ICD-10-CM

## 2018-06-07 LAB
ABO/RH: NORMAL
ANION GAP SERPL CALCULATED.3IONS-SCNC: 12 MMOL/L (ref 7–16)
ANTIBODY SCREEN: NORMAL
APTT: 30.5 SEC (ref 24.5–35.1)
BUN BLDV-MCNC: 11 MG/DL (ref 8–23)
CALCIUM SERPL-MCNC: 9.5 MG/DL (ref 8.6–10.2)
CHLORIDE BLD-SCNC: 102 MMOL/L (ref 98–107)
CO2: 26 MMOL/L (ref 22–29)
CREAT SERPL-MCNC: 0.7 MG/DL (ref 0.7–1.2)
GFR AFRICAN AMERICAN: >60
GFR NON-AFRICAN AMERICAN: >60 ML/MIN/1.73
GLUCOSE BLD-MCNC: 95 MG/DL (ref 74–109)
HCT VFR BLD CALC: 39.6 % (ref 37–54)
HEMOGLOBIN: 13 G/DL (ref 12.5–16.5)
INR BLD: 1.1
MCH RBC QN AUTO: 29.3 PG (ref 26–35)
MCHC RBC AUTO-ENTMCNC: 32.8 % (ref 32–34.5)
MCV RBC AUTO: 89.4 FL (ref 80–99.9)
PDW BLD-RTO: 15 FL (ref 11.5–15)
PLATELET # BLD: 285 E9/L (ref 130–450)
PMV BLD AUTO: 9.5 FL (ref 7–12)
POTASSIUM REFLEX MAGNESIUM: 4.7 MMOL/L (ref 3.5–5)
PROTHROMBIN TIME: 12.4 SEC (ref 9.3–12.4)
RBC # BLD: 4.43 E12/L (ref 3.8–5.8)
SODIUM BLD-SCNC: 140 MMOL/L (ref 132–146)
WBC # BLD: 10.1 E9/L (ref 4.5–11.5)

## 2018-06-07 PROCEDURE — 85610 PROTHROMBIN TIME: CPT

## 2018-06-07 PROCEDURE — 86923 COMPATIBILITY TEST ELECTRIC: CPT

## 2018-06-07 PROCEDURE — 80048 BASIC METABOLIC PNL TOTAL CA: CPT

## 2018-06-07 PROCEDURE — 36415 COLL VENOUS BLD VENIPUNCTURE: CPT

## 2018-06-07 PROCEDURE — 85027 COMPLETE CBC AUTOMATED: CPT

## 2018-06-07 PROCEDURE — 86900 BLOOD TYPING SEROLOGIC ABO: CPT

## 2018-06-07 PROCEDURE — 86901 BLOOD TYPING SEROLOGIC RH(D): CPT

## 2018-06-07 PROCEDURE — 85730 THROMBOPLASTIN TIME PARTIAL: CPT

## 2018-06-07 PROCEDURE — 86850 RBC ANTIBODY SCREEN: CPT

## 2018-06-07 PROCEDURE — 87081 CULTURE SCREEN ONLY: CPT

## 2018-06-07 RX ORDER — MULTIVIT WITH MINERALS/LUTEIN
1 TABLET ORAL DAILY
COMMUNITY

## 2018-06-08 ENCOUNTER — HOSPITAL ENCOUNTER (OUTPATIENT)
Dept: PREADMISSION TESTING | Age: 78
Discharge: HOME | End: 2018-06-08

## 2018-06-08 LAB — MRSA CULTURE ONLY: NORMAL

## 2018-06-11 ENCOUNTER — APPOINTMENT (OUTPATIENT)
Dept: GENERAL RADIOLOGY | Age: 78
DRG: 168 | End: 2018-06-11
Attending: THORACIC SURGERY (CARDIOTHORACIC VASCULAR SURGERY)
Payer: MEDICARE

## 2018-06-11 ENCOUNTER — ANESTHESIA (OUTPATIENT)
Dept: OPERATING ROOM | Age: 78
DRG: 168 | End: 2018-06-11
Payer: MEDICARE

## 2018-06-11 ENCOUNTER — HOSPITAL ENCOUNTER (INPATIENT)
Age: 78
LOS: 1 days | Discharge: HOME HEALTH | DRG: 168 | End: 2018-06-12
Attending: THORACIC SURGERY (CARDIOTHORACIC VASCULAR SURGERY) | Admitting: THORACIC SURGERY (CARDIOTHORACIC VASCULAR SURGERY)
Payer: MEDICARE

## 2018-06-11 VITALS — OXYGEN SATURATION: 91 % | RESPIRATION RATE: 17 BRPM | TEMPERATURE: 97.7 F

## 2018-06-11 DIAGNOSIS — G89.18 ACUTE POST-OPERATIVE PAIN: Primary | ICD-10-CM

## 2018-06-11 DIAGNOSIS — J90 RECURRENT LEFT PLEURAL EFFUSION: ICD-10-CM

## 2018-06-11 LAB
ANION GAP SERPL CALCULATED.3IONS-SCNC: 13 MMOL/L (ref 7–16)
BUN BLDV-MCNC: 7 MG/DL (ref 8–23)
CALCIUM SERPL-MCNC: 8.8 MG/DL (ref 8.6–10.2)
CHLORIDE BLD-SCNC: 104 MMOL/L (ref 98–107)
CO2: 25 MMOL/L (ref 22–29)
CREAT SERPL-MCNC: 0.7 MG/DL (ref 0.7–1.2)
GFR AFRICAN AMERICAN: >60
GFR NON-AFRICAN AMERICAN: >60 ML/MIN/1.73
GLUCOSE BLD-MCNC: 124 MG/DL (ref 74–109)
HCT VFR BLD CALC: 38.7 % (ref 37–54)
HEMOGLOBIN: 12.7 G/DL (ref 12.5–16.5)
MAGNESIUM: 2 MG/DL (ref 1.6–2.6)
MCH RBC QN AUTO: 29.1 PG (ref 26–35)
MCHC RBC AUTO-ENTMCNC: 32.8 % (ref 32–34.5)
MCV RBC AUTO: 88.8 FL (ref 80–99.9)
PDW BLD-RTO: 15 FL (ref 11.5–15)
PLATELET # BLD: 235 E9/L (ref 130–450)
PMV BLD AUTO: 9.2 FL (ref 7–12)
POTASSIUM SERPL-SCNC: 3.9 MMOL/L (ref 3.5–5)
RBC # BLD: 4.36 E12/L (ref 3.8–5.8)
SODIUM BLD-SCNC: 142 MMOL/L (ref 132–146)
WBC # BLD: 11.8 E9/L (ref 4.5–11.5)

## 2018-06-11 PROCEDURE — 87075 CULTR BACTERIA EXCEPT BLOOD: CPT

## 2018-06-11 PROCEDURE — 6370000000 HC RX 637 (ALT 250 FOR IP): Performed by: NURSE PRACTITIONER

## 2018-06-11 PROCEDURE — 3700000000 HC ANESTHESIA ATTENDED CARE: Performed by: THORACIC SURGERY (CARDIOTHORACIC VASCULAR SURGERY)

## 2018-06-11 PROCEDURE — 87070 CULTURE OTHR SPECIMN AEROBIC: CPT

## 2018-06-11 PROCEDURE — 32550 INSERT PLEURAL CATH: CPT | Performed by: THORACIC SURGERY (CARDIOTHORACIC VASCULAR SURGERY)

## 2018-06-11 PROCEDURE — 2140000000 HC CCU INTERMEDIATE R&B

## 2018-06-11 PROCEDURE — 80048 BASIC METABOLIC PNL TOTAL CA: CPT

## 2018-06-11 PROCEDURE — 7100000001 HC PACU RECOVERY - ADDTL 15 MIN: Performed by: THORACIC SURGERY (CARDIOTHORACIC VASCULAR SURGERY)

## 2018-06-11 PROCEDURE — 2500000003 HC RX 250 WO HCPCS: Performed by: THORACIC SURGERY (CARDIOTHORACIC VASCULAR SURGERY)

## 2018-06-11 PROCEDURE — 0BJ08ZZ INSPECTION OF TRACHEOBRONCHIAL TREE, VIA NATURAL OR ARTIFICIAL OPENING ENDOSCOPIC: ICD-10-PCS | Performed by: THORACIC SURGERY (CARDIOTHORACIC VASCULAR SURGERY)

## 2018-06-11 PROCEDURE — 0BBP4ZX EXCISION OF LEFT PLEURA, PERCUTANEOUS ENDOSCOPIC APPROACH, DIAGNOSTIC: ICD-10-PCS | Performed by: THORACIC SURGERY (CARDIOTHORACIC VASCULAR SURGERY)

## 2018-06-11 PROCEDURE — 3700000001 HC ADD 15 MINUTES (ANESTHESIA): Performed by: THORACIC SURGERY (CARDIOTHORACIC VASCULAR SURGERY)

## 2018-06-11 PROCEDURE — 0W9B40Z DRAINAGE OF LEFT PLEURAL CAVITY WITH DRAINAGE DEVICE, PERCUTANEOUS ENDOSCOPIC APPROACH: ICD-10-PCS | Performed by: THORACIC SURGERY (CARDIOTHORACIC VASCULAR SURGERY)

## 2018-06-11 PROCEDURE — 6360000002 HC RX W HCPCS

## 2018-06-11 PROCEDURE — 83735 ASSAY OF MAGNESIUM: CPT

## 2018-06-11 PROCEDURE — 87102 FUNGUS ISOLATION CULTURE: CPT

## 2018-06-11 PROCEDURE — 7100000000 HC PACU RECOVERY - FIRST 15 MIN: Performed by: THORACIC SURGERY (CARDIOTHORACIC VASCULAR SURGERY)

## 2018-06-11 PROCEDURE — 36620 INSERTION CATHETER ARTERY: CPT | Performed by: ANESTHESIOLOGY

## 2018-06-11 PROCEDURE — 2580000003 HC RX 258

## 2018-06-11 PROCEDURE — 2500000003 HC RX 250 WO HCPCS: Performed by: NURSE ANESTHETIST, CERTIFIED REGISTERED

## 2018-06-11 PROCEDURE — 71045 X-RAY EXAM CHEST 1 VIEW: CPT

## 2018-06-11 PROCEDURE — 6360000002 HC RX W HCPCS: Performed by: NURSE PRACTITIONER

## 2018-06-11 PROCEDURE — 2580000003 HC RX 258: Performed by: NURSE PRACTITIONER

## 2018-06-11 PROCEDURE — 6360000002 HC RX W HCPCS: Performed by: ANESTHESIOLOGY

## 2018-06-11 PROCEDURE — 3600000004 HC SURGERY LEVEL 4 BASE: Performed by: THORACIC SURGERY (CARDIOTHORACIC VASCULAR SURGERY)

## 2018-06-11 PROCEDURE — 2500000003 HC RX 250 WO HCPCS

## 2018-06-11 PROCEDURE — 36415 COLL VENOUS BLD VENIPUNCTURE: CPT

## 2018-06-11 PROCEDURE — 88305 TISSUE EXAM BY PATHOLOGIST: CPT

## 2018-06-11 PROCEDURE — 85027 COMPLETE CBC AUTOMATED: CPT

## 2018-06-11 PROCEDURE — 6360000002 HC RX W HCPCS: Performed by: INTERNAL MEDICINE

## 2018-06-11 PROCEDURE — 88112 CYTOPATH CELL ENHANCE TECH: CPT

## 2018-06-11 PROCEDURE — 6360000002 HC RX W HCPCS: Performed by: NURSE ANESTHETIST, CERTIFIED REGISTERED

## 2018-06-11 PROCEDURE — 87205 SMEAR GRAM STAIN: CPT

## 2018-06-11 PROCEDURE — 3600000014 HC SURGERY LEVEL 4 ADDTL 15MIN: Performed by: THORACIC SURGERY (CARDIOTHORACIC VASCULAR SURGERY)

## 2018-06-11 PROCEDURE — 6370000000 HC RX 637 (ALT 250 FOR IP): Performed by: INTERNAL MEDICINE

## 2018-06-11 PROCEDURE — 94664 DEMO&/EVAL PT USE INHALER: CPT

## 2018-06-11 PROCEDURE — 32609 THORACOSCOPY W/BX PLEURA: CPT | Performed by: THORACIC SURGERY (CARDIOTHORACIC VASCULAR SURGERY)

## 2018-06-11 PROCEDURE — 31622 DX BRONCHOSCOPE/WASH: CPT | Performed by: THORACIC SURGERY (CARDIOTHORACIC VASCULAR SURGERY)

## 2018-06-11 RX ORDER — SODIUM CHLORIDE 0.9 % (FLUSH) 0.9 %
10 SYRINGE (ML) INJECTION EVERY 12 HOURS SCHEDULED
Status: DISCONTINUED | OUTPATIENT
Start: 2018-06-11 | End: 2018-06-11 | Stop reason: HOSPADM

## 2018-06-11 RX ORDER — MORPHINE SULFATE 2 MG/ML
2 INJECTION, SOLUTION INTRAMUSCULAR; INTRAVENOUS EVERY 4 HOURS PRN
Status: DISCONTINUED | OUTPATIENT
Start: 2018-06-11 | End: 2018-06-11 | Stop reason: SDUPTHER

## 2018-06-11 RX ORDER — VECURONIUM BROMIDE 1 MG/ML
INJECTION, POWDER, LYOPHILIZED, FOR SOLUTION INTRAVENOUS PRN
Status: DISCONTINUED | OUTPATIENT
Start: 2018-06-11 | End: 2018-06-11 | Stop reason: SDUPTHER

## 2018-06-11 RX ORDER — ROCURONIUM BROMIDE 10 MG/ML
INJECTION, SOLUTION INTRAVENOUS PRN
Status: DISCONTINUED | OUTPATIENT
Start: 2018-06-11 | End: 2018-06-11

## 2018-06-11 RX ORDER — FORMOTEROL FUMARATE 20 UG/2ML
20 SOLUTION RESPIRATORY (INHALATION) EVERY 12 HOURS
Status: DISCONTINUED | OUTPATIENT
Start: 2018-06-11 | End: 2018-06-12 | Stop reason: HOSPADM

## 2018-06-11 RX ORDER — BUPIVACAINE HYDROCHLORIDE AND EPINEPHRINE 5; 5 MG/ML; UG/ML
INJECTION, SOLUTION EPIDURAL; INTRACAUDAL; PERINEURAL PRN
Status: DISCONTINUED | OUTPATIENT
Start: 2018-06-11 | End: 2018-06-11 | Stop reason: HOSPADM

## 2018-06-11 RX ORDER — SODIUM CHLORIDE 9 MG/ML
INJECTION, SOLUTION INTRAVENOUS CONTINUOUS
Status: DISCONTINUED | OUTPATIENT
Start: 2018-06-11 | End: 2018-06-11

## 2018-06-11 RX ORDER — SODIUM CHLORIDE 0.9 % (FLUSH) 0.9 %
10 SYRINGE (ML) INJECTION PRN
Status: DISCONTINUED | OUTPATIENT
Start: 2018-06-11 | End: 2018-06-12 | Stop reason: HOSPADM

## 2018-06-11 RX ORDER — MIDAZOLAM HYDROCHLORIDE 1 MG/ML
INJECTION INTRAMUSCULAR; INTRAVENOUS PRN
Status: DISCONTINUED | OUTPATIENT
Start: 2018-06-11 | End: 2018-06-11 | Stop reason: SDUPTHER

## 2018-06-11 RX ORDER — ONDANSETRON 2 MG/ML
4 INJECTION INTRAMUSCULAR; INTRAVENOUS EVERY 6 HOURS PRN
Status: DISCONTINUED | OUTPATIENT
Start: 2018-06-11 | End: 2018-06-12 | Stop reason: HOSPADM

## 2018-06-11 RX ORDER — SODIUM CHLORIDE 0.9 % (FLUSH) 0.9 %
10 SYRINGE (ML) INJECTION PRN
Status: DISCONTINUED | OUTPATIENT
Start: 2018-06-11 | End: 2018-06-11 | Stop reason: HOSPADM

## 2018-06-11 RX ORDER — PROPOFOL 10 MG/ML
INJECTION, EMULSION INTRAVENOUS PRN
Status: DISCONTINUED | OUTPATIENT
Start: 2018-06-11 | End: 2018-06-11 | Stop reason: SDUPTHER

## 2018-06-11 RX ORDER — MEPERIDINE HYDROCHLORIDE 50 MG/ML
12.5 INJECTION INTRAMUSCULAR; INTRAVENOUS; SUBCUTANEOUS EVERY 5 MIN PRN
Status: DISCONTINUED | OUTPATIENT
Start: 2018-06-11 | End: 2018-06-11 | Stop reason: HOSPADM

## 2018-06-11 RX ORDER — IPRATROPIUM BROMIDE AND ALBUTEROL SULFATE 2.5; .5 MG/3ML; MG/3ML
1 SOLUTION RESPIRATORY (INHALATION)
Status: DISCONTINUED | OUTPATIENT
Start: 2018-06-11 | End: 2018-06-12 | Stop reason: HOSPADM

## 2018-06-11 RX ORDER — MORPHINE SULFATE 2 MG/ML
2 INJECTION, SOLUTION INTRAMUSCULAR; INTRAVENOUS EVERY 5 MIN PRN
Status: DISCONTINUED | OUTPATIENT
Start: 2018-06-11 | End: 2018-06-11 | Stop reason: HOSPADM

## 2018-06-11 RX ORDER — ACETAMINOPHEN 325 MG/1
650 TABLET ORAL EVERY 6 HOURS
Status: DISCONTINUED | OUTPATIENT
Start: 2018-06-11 | End: 2018-06-12 | Stop reason: HOSPADM

## 2018-06-11 RX ORDER — DEXAMETHASONE SODIUM PHOSPHATE 10 MG/ML
INJECTION INTRAMUSCULAR; INTRAVENOUS PRN
Status: DISCONTINUED | OUTPATIENT
Start: 2018-06-11 | End: 2018-06-11 | Stop reason: SDUPTHER

## 2018-06-11 RX ORDER — LABETALOL HYDROCHLORIDE 5 MG/ML
INJECTION, SOLUTION INTRAVENOUS PRN
Status: DISCONTINUED | OUTPATIENT
Start: 2018-06-11 | End: 2018-06-11 | Stop reason: SDUPTHER

## 2018-06-11 RX ORDER — ATORVASTATIN CALCIUM 10 MG/1
10 TABLET, FILM COATED ORAL DAILY
Status: DISCONTINUED | OUTPATIENT
Start: 2018-06-12 | End: 2018-06-12 | Stop reason: HOSPADM

## 2018-06-11 RX ORDER — IPRATROPIUM BROMIDE AND ALBUTEROL SULFATE 2.5; .5 MG/3ML; MG/3ML
1 SOLUTION RESPIRATORY (INHALATION) EVERY 4 HOURS PRN
Status: DISCONTINUED | OUTPATIENT
Start: 2018-06-11 | End: 2018-06-12 | Stop reason: HOSPADM

## 2018-06-11 RX ORDER — CHLORHEXIDINE GLUCONATE 0.12 MG/ML
15 RINSE ORAL ONCE
Status: COMPLETED | OUTPATIENT
Start: 2018-06-11 | End: 2018-06-11

## 2018-06-11 RX ORDER — DOCUSATE SODIUM 100 MG/1
100 CAPSULE, LIQUID FILLED ORAL 2 TIMES DAILY PRN
Status: DISCONTINUED | OUTPATIENT
Start: 2018-06-11 | End: 2018-06-12 | Stop reason: HOSPADM

## 2018-06-11 RX ORDER — CHLORHEXIDINE GLUCONATE 4 G/100ML
SOLUTION TOPICAL ONCE
Status: DISCONTINUED | OUTPATIENT
Start: 2018-06-11 | End: 2018-06-11 | Stop reason: HOSPADM

## 2018-06-11 RX ORDER — MORPHINE SULFATE 2 MG/ML
2 INJECTION, SOLUTION INTRAMUSCULAR; INTRAVENOUS EVERY 4 HOURS PRN
Status: DISCONTINUED | OUTPATIENT
Start: 2018-06-11 | End: 2018-06-12 | Stop reason: HOSPADM

## 2018-06-11 RX ORDER — HYDRALAZINE HYDROCHLORIDE 20 MG/ML
5 INJECTION INTRAMUSCULAR; INTRAVENOUS EVERY 10 MIN PRN
Status: DISCONTINUED | OUTPATIENT
Start: 2018-06-11 | End: 2018-06-11 | Stop reason: HOSPADM

## 2018-06-11 RX ORDER — FENTANYL CITRATE 50 UG/ML
INJECTION, SOLUTION INTRAMUSCULAR; INTRAVENOUS PRN
Status: DISCONTINUED | OUTPATIENT
Start: 2018-06-11 | End: 2018-06-11 | Stop reason: SDUPTHER

## 2018-06-11 RX ORDER — GLYCOPYRROLATE 0.6MG/3ML
SYRINGE (ML) INTRAVENOUS PRN
Status: DISCONTINUED | OUTPATIENT
Start: 2018-06-11 | End: 2018-06-11 | Stop reason: SDUPTHER

## 2018-06-11 RX ORDER — LABETALOL HYDROCHLORIDE 5 MG/ML
5 INJECTION, SOLUTION INTRAVENOUS EVERY 10 MIN PRN
Status: DISCONTINUED | OUTPATIENT
Start: 2018-06-11 | End: 2018-06-11 | Stop reason: HOSPADM

## 2018-06-11 RX ORDER — ASPIRIN 81 MG/1
81 TABLET ORAL DAILY
Status: DISCONTINUED | OUTPATIENT
Start: 2018-06-12 | End: 2018-06-12 | Stop reason: HOSPADM

## 2018-06-11 RX ORDER — LABETALOL HYDROCHLORIDE 5 MG/ML
INJECTION, SOLUTION INTRAVENOUS
Status: COMPLETED
Start: 2018-06-11 | End: 2018-06-11

## 2018-06-11 RX ORDER — PROMETHAZINE HYDROCHLORIDE 25 MG/ML
6.25 INJECTION, SOLUTION INTRAMUSCULAR; INTRAVENOUS
Status: DISCONTINUED | OUTPATIENT
Start: 2018-06-11 | End: 2018-06-11 | Stop reason: HOSPADM

## 2018-06-11 RX ORDER — OXYCODONE HYDROCHLORIDE 5 MG/1
5 TABLET ORAL EVERY 4 HOURS PRN
Status: DISCONTINUED | OUTPATIENT
Start: 2018-06-11 | End: 2018-06-12 | Stop reason: HOSPADM

## 2018-06-11 RX ORDER — SODIUM CHLORIDE, SODIUM LACTATE, POTASSIUM CHLORIDE, CALCIUM CHLORIDE 600; 310; 30; 20 MG/100ML; MG/100ML; MG/100ML; MG/100ML
INJECTION, SOLUTION INTRAVENOUS CONTINUOUS PRN
Status: DISCONTINUED | OUTPATIENT
Start: 2018-06-11 | End: 2018-06-11 | Stop reason: SDUPTHER

## 2018-06-11 RX ORDER — SODIUM CHLORIDE 0.9 % (FLUSH) 0.9 %
10 SYRINGE (ML) INJECTION EVERY 12 HOURS SCHEDULED
Status: DISCONTINUED | OUTPATIENT
Start: 2018-06-11 | End: 2018-06-12 | Stop reason: HOSPADM

## 2018-06-11 RX ORDER — PANTOPRAZOLE SODIUM 40 MG/1
40 TABLET, DELAYED RELEASE ORAL DAILY
Status: DISCONTINUED | OUTPATIENT
Start: 2018-06-12 | End: 2018-06-12 | Stop reason: HOSPADM

## 2018-06-11 RX ORDER — ONDANSETRON 2 MG/ML
INJECTION INTRAMUSCULAR; INTRAVENOUS PRN
Status: DISCONTINUED | OUTPATIENT
Start: 2018-06-11 | End: 2018-06-11 | Stop reason: SDUPTHER

## 2018-06-11 RX ORDER — NEOSTIGMINE METHYLSULFATE 1 MG/ML
INJECTION, SOLUTION INTRAVENOUS PRN
Status: DISCONTINUED | OUTPATIENT
Start: 2018-06-11 | End: 2018-06-11 | Stop reason: SDUPTHER

## 2018-06-11 RX ADMIN — VECURONIUM BROMIDE 7 MG: 10 INJECTION, POWDER, LYOPHILIZED, FOR SOLUTION INTRAVENOUS at 11:55

## 2018-06-11 RX ADMIN — Medication 10 ML: at 20:59

## 2018-06-11 RX ADMIN — LIDOCAINE HYDROCHLORIDE 80 MG: 20 INJECTION, SOLUTION INTRAVENOUS at 11:58

## 2018-06-11 RX ADMIN — Medication 0.6 MG: at 12:47

## 2018-06-11 RX ADMIN — MORPHINE SULFATE 2 MG: 2 INJECTION, SOLUTION INTRAMUSCULAR; INTRAVENOUS at 14:45

## 2018-06-11 RX ADMIN — FENTANYL CITRATE 100 MCG: 50 INJECTION, SOLUTION INTRAMUSCULAR; INTRAVENOUS at 11:55

## 2018-06-11 RX ADMIN — Medication 3 MG: at 12:47

## 2018-06-11 RX ADMIN — CHLORHEXIDINE GLUCONATE 15 ML: 1.2 RINSE ORAL at 09:25

## 2018-06-11 RX ADMIN — LABETALOL HYDROCHLORIDE 5 MG: 5 INJECTION, SOLUTION INTRAVENOUS at 13:04

## 2018-06-11 RX ADMIN — FENTANYL CITRATE 50 MCG: 50 INJECTION, SOLUTION INTRAMUSCULAR; INTRAVENOUS at 12:16

## 2018-06-11 RX ADMIN — MIDAZOLAM 2 MG: 1 INJECTION INTRAMUSCULAR; INTRAVENOUS at 11:45

## 2018-06-11 RX ADMIN — FORMOTEROL FUMARATE DIHYDRATE 20 MCG: 20 SOLUTION RESPIRATORY (INHALATION) at 20:21

## 2018-06-11 RX ADMIN — PROPOFOL 150 MG: 10 INJECTION, EMULSION INTRAVENOUS at 11:58

## 2018-06-11 RX ADMIN — SODIUM CHLORIDE, POTASSIUM CHLORIDE, SODIUM LACTATE AND CALCIUM CHLORIDE: 600; 310; 30; 20 INJECTION, SOLUTION INTRAVENOUS at 11:45

## 2018-06-11 RX ADMIN — SODIUM CHLORIDE: 9 INJECTION, SOLUTION INTRAVENOUS at 09:25

## 2018-06-11 RX ADMIN — Medication 2 G: at 20:56

## 2018-06-11 RX ADMIN — MORPHINE SULFATE 2 MG: 2 INJECTION, SOLUTION INTRAMUSCULAR; INTRAVENOUS at 16:55

## 2018-06-11 RX ADMIN — MUPIROCIN: 20 OINTMENT TOPICAL at 20:57

## 2018-06-11 RX ADMIN — DEXAMETHASONE SODIUM PHOSPHATE 10 MG: 10 INJECTION INTRAMUSCULAR; INTRAVENOUS at 12:27

## 2018-06-11 RX ADMIN — CEFAZOLIN SODIUM 2 G: 2 SOLUTION INTRAVENOUS at 11:55

## 2018-06-11 RX ADMIN — ONDANSETRON 4 MG: 2 INJECTION INTRAMUSCULAR; INTRAVENOUS at 11:55

## 2018-06-11 RX ADMIN — ACETAMINOPHEN 650 MG: 325 TABLET ORAL at 20:07

## 2018-06-11 ASSESSMENT — PAIN DESCRIPTION - DESCRIPTORS
DESCRIPTORS: JABBING;DISCOMFORT
DESCRIPTORS: SORE

## 2018-06-11 ASSESSMENT — PULMONARY FUNCTION TESTS
PIF_VALUE: 0
PIF_VALUE: 24
PIF_VALUE: 24
PIF_VALUE: 23
PIF_VALUE: 24
PIF_VALUE: 0
PIF_VALUE: 20
PIF_VALUE: 23
PIF_VALUE: 21
PIF_VALUE: 24
PIF_VALUE: 23
PIF_VALUE: 23
PIF_VALUE: 24
PIF_VALUE: 23
PIF_VALUE: 24
PIF_VALUE: 19
PIF_VALUE: 24
PIF_VALUE: 2
PIF_VALUE: 24
PIF_VALUE: 12
PIF_VALUE: 24
PIF_VALUE: 23
PIF_VALUE: 1
PIF_VALUE: 21
PIF_VALUE: 23
PIF_VALUE: 23
PIF_VALUE: 0
PIF_VALUE: 23
PIF_VALUE: 0
PIF_VALUE: 24
PIF_VALUE: 23
PIF_VALUE: 24
PIF_VALUE: 24
PIF_VALUE: 2
PIF_VALUE: 22
PIF_VALUE: 20
PIF_VALUE: 20
PIF_VALUE: 24
PIF_VALUE: 0
PIF_VALUE: 23
PIF_VALUE: 21
PIF_VALUE: 23
PIF_VALUE: 21
PIF_VALUE: 20
PIF_VALUE: 24
PIF_VALUE: 24
PIF_VALUE: 23
PIF_VALUE: 23
PIF_VALUE: 0
PIF_VALUE: 1
PIF_VALUE: 23
PIF_VALUE: 23
PIF_VALUE: 1
PIF_VALUE: 24
PIF_VALUE: 23
PIF_VALUE: 21
PIF_VALUE: 16
PIF_VALUE: 23
PIF_VALUE: 0
PIF_VALUE: 9
PIF_VALUE: 4
PIF_VALUE: 18
PIF_VALUE: 24

## 2018-06-11 ASSESSMENT — PAIN DESCRIPTION - ORIENTATION
ORIENTATION: LEFT

## 2018-06-11 ASSESSMENT — PAIN SCALES - GENERAL
PAINLEVEL_OUTOF10: 5
PAINLEVEL_OUTOF10: 0
PAINLEVEL_OUTOF10: 0
PAINLEVEL_OUTOF10: 6
PAINLEVEL_OUTOF10: 0
PAINLEVEL_OUTOF10: 0
PAINLEVEL_OUTOF10: 3
PAINLEVEL_OUTOF10: 3

## 2018-06-11 ASSESSMENT — PAIN DESCRIPTION - PAIN TYPE
TYPE: SURGICAL PAIN

## 2018-06-11 ASSESSMENT — PAIN DESCRIPTION - LOCATION
LOCATION: CHEST

## 2018-06-11 ASSESSMENT — PAIN - FUNCTIONAL ASSESSMENT: PAIN_FUNCTIONAL_ASSESSMENT: 0-10

## 2018-06-12 ENCOUNTER — APPOINTMENT (OUTPATIENT)
Dept: GENERAL RADIOLOGY | Age: 78
DRG: 168 | End: 2018-06-12
Attending: THORACIC SURGERY (CARDIOTHORACIC VASCULAR SURGERY)
Payer: MEDICARE

## 2018-06-12 VITALS
TEMPERATURE: 97.8 F | WEIGHT: 186.8 LBS | DIASTOLIC BLOOD PRESSURE: 71 MMHG | OXYGEN SATURATION: 94 % | RESPIRATION RATE: 16 BRPM | HEART RATE: 84 BPM | BODY MASS INDEX: 30.02 KG/M2 | SYSTOLIC BLOOD PRESSURE: 120 MMHG | HEIGHT: 66 IN

## 2018-06-12 LAB
ANION GAP SERPL CALCULATED.3IONS-SCNC: 12 MMOL/L (ref 7–16)
BUN BLDV-MCNC: 11 MG/DL (ref 8–23)
CALCIUM SERPL-MCNC: 9 MG/DL (ref 8.6–10.2)
CHLORIDE BLD-SCNC: 98 MMOL/L (ref 98–107)
CO2: 26 MMOL/L (ref 22–29)
CREAT SERPL-MCNC: 0.7 MG/DL (ref 0.7–1.2)
GFR AFRICAN AMERICAN: >60
GFR NON-AFRICAN AMERICAN: >60 ML/MIN/1.73
GLUCOSE BLD-MCNC: 106 MG/DL (ref 74–109)
GRAM STAIN ORDERABLE: NORMAL
HCT VFR BLD CALC: 39.1 % (ref 37–54)
HEMOGLOBIN: 12.9 G/DL (ref 12.5–16.5)
MCH RBC QN AUTO: 29.2 PG (ref 26–35)
MCHC RBC AUTO-ENTMCNC: 33 % (ref 32–34.5)
MCV RBC AUTO: 88.5 FL (ref 80–99.9)
PDW BLD-RTO: 14.8 FL (ref 11.5–15)
PLATELET # BLD: 244 E9/L (ref 130–450)
PMV BLD AUTO: 9.5 FL (ref 7–12)
POTASSIUM SERPL-SCNC: 4.2 MMOL/L (ref 3.5–5)
RBC # BLD: 4.42 E12/L (ref 3.8–5.8)
SODIUM BLD-SCNC: 136 MMOL/L (ref 132–146)
WBC # BLD: 14.3 E9/L (ref 4.5–11.5)

## 2018-06-12 PROCEDURE — 6370000000 HC RX 637 (ALT 250 FOR IP): Performed by: INTERNAL MEDICINE

## 2018-06-12 PROCEDURE — 71045 X-RAY EXAM CHEST 1 VIEW: CPT

## 2018-06-12 PROCEDURE — 6370000000 HC RX 637 (ALT 250 FOR IP): Performed by: NURSE PRACTITIONER

## 2018-06-12 PROCEDURE — 80048 BASIC METABOLIC PNL TOTAL CA: CPT

## 2018-06-12 PROCEDURE — 36415 COLL VENOUS BLD VENIPUNCTURE: CPT

## 2018-06-12 PROCEDURE — 85027 COMPLETE CBC AUTOMATED: CPT

## 2018-06-12 PROCEDURE — 6360000002 HC RX W HCPCS: Performed by: NURSE PRACTITIONER

## 2018-06-12 PROCEDURE — 94640 AIRWAY INHALATION TREATMENT: CPT

## 2018-06-12 PROCEDURE — 2580000003 HC RX 258: Performed by: NURSE PRACTITIONER

## 2018-06-12 RX ORDER — OXYCODONE HYDROCHLORIDE AND ACETAMINOPHEN 5; 325 MG/1; MG/1
1 TABLET ORAL EVERY 6 HOURS PRN
Qty: 28 TABLET | Refills: 0 | Status: SHIPPED | OUTPATIENT
Start: 2018-06-12 | End: 2018-06-19

## 2018-06-12 RX ORDER — PSEUDOEPHEDRINE HCL 30 MG
100 TABLET ORAL 2 TIMES DAILY PRN
Qty: 40 CAPSULE | Refills: 0 | Status: SHIPPED | OUTPATIENT
Start: 2018-06-12

## 2018-06-12 RX ADMIN — OXYCODONE HYDROCHLORIDE 5 MG: 5 TABLET ORAL at 04:01

## 2018-06-12 RX ADMIN — LINACLOTIDE 290 MCG: 145 CAPSULE, GELATIN COATED ORAL at 06:23

## 2018-06-12 RX ADMIN — PANTOPRAZOLE SODIUM 40 MG: 40 TABLET, DELAYED RELEASE ORAL at 08:11

## 2018-06-12 RX ADMIN — FORMOTEROL FUMARATE DIHYDRATE 20 MCG: 20 SOLUTION RESPIRATORY (INHALATION) at 09:12

## 2018-06-12 RX ADMIN — MUPIROCIN: 20 OINTMENT TOPICAL at 08:10

## 2018-06-12 RX ADMIN — MAGNESIUM HYDROXIDE 30 ML: 400 SUSPENSION ORAL at 08:11

## 2018-06-12 RX ADMIN — ASPIRIN 81 MG: 81 TABLET, COATED ORAL at 08:11

## 2018-06-12 RX ADMIN — ATORVASTATIN CALCIUM 10 MG: 10 TABLET, FILM COATED ORAL at 08:11

## 2018-06-12 RX ADMIN — TIOTROPIUM BROMIDE 18 MCG: 18 CAPSULE ORAL; RESPIRATORY (INHALATION) at 08:10

## 2018-06-12 RX ADMIN — Medication 2 G: at 04:02

## 2018-06-12 RX ADMIN — DOCUSATE SODIUM 100 MG: 100 CAPSULE, LIQUID FILLED ORAL at 08:11

## 2018-06-12 RX ADMIN — Medication 10 ML: at 08:13

## 2018-06-12 RX ADMIN — ACETAMINOPHEN 650 MG: 325 TABLET ORAL at 07:16

## 2018-06-12 ASSESSMENT — PAIN SCALES - GENERAL
PAINLEVEL_OUTOF10: 6
PAINLEVEL_OUTOF10: 0
PAINLEVEL_OUTOF10: 6

## 2018-06-13 LAB
BODY FLUID CULTURE, STERILE: NORMAL
GRAM STAIN RESULT: NORMAL

## 2018-06-16 LAB
ANAEROBIC CULTURE: NORMAL
BLOOD BANK DISPENSE STATUS: NORMAL
BLOOD BANK PRODUCT CODE: NORMAL
BPU ID: NORMAL
DESCRIPTION BLOOD BANK: NORMAL

## 2018-06-26 ENCOUNTER — OFFICE VISIT (OUTPATIENT)
Dept: CARDIOTHORACIC SURGERY | Age: 78
End: 2018-06-26

## 2018-06-26 VITALS
HEART RATE: 65 BPM | SYSTOLIC BLOOD PRESSURE: 125 MMHG | BODY MASS INDEX: 31.18 KG/M2 | DIASTOLIC BLOOD PRESSURE: 80 MMHG | WEIGHT: 194 LBS | HEIGHT: 66 IN

## 2018-06-26 DIAGNOSIS — Z09 S/P LUNG SURGERY, FOLLOW-UP EXAM: Primary | ICD-10-CM

## 2018-06-26 PROCEDURE — 99024 POSTOP FOLLOW-UP VISIT: CPT | Performed by: NURSE PRACTITIONER

## 2018-06-28 ENCOUNTER — TELEPHONE (OUTPATIENT)
Dept: CARDIOTHORACIC SURGERY | Age: 78
End: 2018-06-28

## 2018-06-29 ENCOUNTER — PREP FOR PROCEDURE (OUTPATIENT)
Dept: CARDIOTHORACIC SURGERY | Age: 78
End: 2018-06-29

## 2018-06-29 RX ORDER — SODIUM CHLORIDE 9 MG/ML
INJECTION, SOLUTION INTRAVENOUS CONTINUOUS
Status: CANCELLED | OUTPATIENT
Start: 2018-06-29 | End: 2019-06-29

## 2018-07-02 ENCOUNTER — HOSPITAL ENCOUNTER (OUTPATIENT)
Dept: SURGERY | Age: 78
Discharge: HOME | End: 2018-07-02
Attending: THORACIC SURGERY (CARDIOTHORACIC VASCULAR SURGERY) | Admitting: THORACIC SURGERY (CARDIOTHORACIC VASCULAR SURGERY)
Payer: MEDICARE

## 2018-07-02 ENCOUNTER — ANESTHESIA (OUTPATIENT)
Dept: SURGERY | Age: 78
End: 2018-07-02
Payer: MEDICARE

## 2018-07-02 ENCOUNTER — ANESTHESIA EVENT (OUTPATIENT)
Dept: SURGERY | Age: 78
End: 2018-07-02
Payer: MEDICARE

## 2018-07-02 VITALS
OXYGEN SATURATION: 95 % | DIASTOLIC BLOOD PRESSURE: 86 MMHG | HEART RATE: 66 BPM | SYSTOLIC BLOOD PRESSURE: 139 MMHG | RESPIRATION RATE: 17 BRPM | TEMPERATURE: 98.2 F

## 2018-07-02 VITALS — DIASTOLIC BLOOD PRESSURE: 83 MMHG | OXYGEN SATURATION: 100 % | SYSTOLIC BLOOD PRESSURE: 116 MMHG

## 2018-07-02 PROCEDURE — 6360000002 HC RX W HCPCS: Performed by: NURSE PRACTITIONER

## 2018-07-02 PROCEDURE — 6360000002 HC RX W HCPCS: Performed by: NURSE ANESTHETIST, CERTIFIED REGISTERED

## 2018-07-02 PROCEDURE — 32552 REMOVE LUNG CATHETER: CPT | Performed by: THORACIC SURGERY (CARDIOTHORACIC VASCULAR SURGERY)

## 2018-07-02 PROCEDURE — 3700000001 HC ADD 15 MINUTES (ANESTHESIA)

## 2018-07-02 PROCEDURE — 3700000000 HC ANESTHESIA ATTENDED CARE

## 2018-07-02 PROCEDURE — 2580000003 HC RX 258: Performed by: NURSE PRACTITIONER

## 2018-07-02 PROCEDURE — 32552 REMOVE LUNG CATHETER: CPT

## 2018-07-02 PROCEDURE — 7100000010 HC PHASE II RECOVERY - FIRST 15 MIN

## 2018-07-02 PROCEDURE — 7100000011 HC PHASE II RECOVERY - ADDTL 15 MIN

## 2018-07-02 RX ORDER — PROPOFOL 10 MG/ML
INJECTION, EMULSION INTRAVENOUS PRN
Status: DISCONTINUED | OUTPATIENT
Start: 2018-07-02 | End: 2018-07-02 | Stop reason: SDUPTHER

## 2018-07-02 RX ORDER — SODIUM CHLORIDE 9 MG/ML
INJECTION, SOLUTION INTRAVENOUS CONTINUOUS
Status: DISCONTINUED | OUTPATIENT
Start: 2018-07-02 | End: 2018-07-02 | Stop reason: HOSPADM

## 2018-07-02 RX ADMIN — PROPOFOL 60 MG: 10 INJECTION, EMULSION INTRAVENOUS at 10:28

## 2018-07-02 RX ADMIN — SODIUM CHLORIDE: 9 INJECTION, SOLUTION INTRAVENOUS at 10:27

## 2018-07-02 RX ADMIN — CEFAZOLIN SODIUM 2 G: 2 SOLUTION INTRAVENOUS at 10:28

## 2018-07-02 ASSESSMENT — PAIN SCALES - GENERAL
PAINLEVEL_OUTOF10: 0
PAINLEVEL_OUTOF10: 0

## 2018-07-02 NOTE — ANESTHESIA PRE PROCEDURE
Department of Anesthesiology  Preprocedure Note       Name:  Temo Miranda   Age:  65 y.o.  :  1940                                          MRN:  [id number]         Date:  2018      Surgeon: Compa Rojas  Procedure: Procedure(s):  Pleurex Cath Removal    Medications prior to admission:   Prior to Admission medications    Medication Sig Start Date End Date Taking? Authorizing Provider   docusate sodium (COLACE, DULCOLAX) 100 MG CAPS Take 100 mg by mouth 2 times daily as needed for Constipation 18   Keithelene Ogles, APRN - CNP   Multiple Vitamins-Minerals (CENTRUM SILVER PO) Take by mouth STOP PREOP MED    Historical Provider, MD   linaclotide (LINZESS) 290 MCG CAPS capsule Take 290 mcg by mouth daily as needed     Historical Provider, MD   pantoprazole (PROTONIX) 40 MG tablet Take 40 mg by mouth daily 3/23/18   Historical Provider, MD   umeclidinium-vilanterol (ANORO ELLIPTA) 62.5-25 MCG/INH AEPB inhaler Inhale 1 puff into the lungs daily 18   Leda Anthony MD   atorvastatin (LIPITOR) 10 MG tablet Take 10 mg by mouth daily    Historical Provider, MD   aspirin EC 81 MG EC tablet Take 81 mg by mouth daily    Historical Provider, MD       Current medications:    No current facility-administered medications for this visit. No current outpatient prescriptions on file.      Facility-Administered Medications Ordered in Other Visits   Medication Dose Route Frequency Provider Last Rate Last Dose    0.9 % sodium chloride infusion   Intravenous Continuous EthelenSUNSHINE Martinez CNP        ceFAZolin (ANCEF) 2 g in dextrose 3 % 50 mL IVPB (duplex)  2 g Intravenous On Call to [address] SUNSHINE De La Paz CNP           Allergies:  No Known Allergies    Problem List:    Patient Active Problem List   Diagnosis Code    S/P AAA repair using bifurcation graft Z95.828, Z86.79    PVD (peripheral vascular disease) with claudication ([facility]) I73.9    Femoral-popliteal atherosclerosis ([facility] [facility] I70.209    Tobacco dependence F17.200    Hx of endovascular stent graft for abdominal aortic aneurysm Z95.828    BPH (benign prostatic hyperplasia) N40.0    History of BPH Z87.438    Community acquired pneumonia of left lower lobe of lung ([facility] [address] J18.1    Infection due to human metapneumovirus (hMPV) B97.81    Influenza B J10.1    COPD (chronic obstructive pulmonary disease) ([facility]) J44.9    Thoracic aortic aneurysm ([facility]) I71.2    Hyperlipidemia E78.5    Atrial fibrillation with RVR ([facility]) I48.91    Recurrent left pleural effusion J90       Past Medical History:        Diagnosis Date    Bilateral carotid bruits 12/2/2017    Enlarged prostate     Femoral-popliteal atherosclerosis ([facility] [address] 12/2/2017    [place] (hard of hearing)     Hx of endovascular stent graft for abdominal aortic aneurysm 12/2/2017    Hyperlipidemia     Pneumonia 05/2018    flu hospitalized 7 rose marie    Tobacco dependence 12/2/2017       Past Surgical History:        Procedure Laterality Date    ABDOMINAL AORTIC ANEURYSM REPAIR  12/11/2008    Zenith, Delatore    APPENDECTOMY      COLONOSCOPY      EYE SURGERY      imelda lense implants    OTHER SURGICAL HISTORY  3-    RLE lysis catheter    OTHER SURGICAL HISTORY  3-    Dr. Freddy Mitchell- Angioplasty with stent to right common illiac artery    CT THORSC DX LUNGS/PERICAR/MED/PLEURAL SPACE W/O BX Left 6/11/2018    BRONCH, LEFT VIDEO ASSISTED THORACOSCOPY WITH DECORTICATION performed by Yue Russ MD at [facility]  01/10/2018    cysto retro pyelo for bph       Social History:    Social History   Substance Use Topics    Smoking status: Current Every Day Smoker     Packs/day: 0.25     Years: 62.00     Types: Cigarettes    Smokeless tobacco: Never Used      Comment: decreasing    Alcohol use 0.6 oz/week     1 Glasses of wine per week                                Ready to quit: Not Answered  Counseling given: Not Answered      Vital Signs (Current): There were no vitals filed for this visit. ROS comment: H/O TURP procedure Abdominal:           Vascular:   + PVD, aortic or cerebral, . ROS comment: H/O endovascular aortic aneurysm repair. Anesthesia Plan      MAC     ASA 3       Induction: intravenous. Anesthetic plan and risks discussed with patient. Use of blood products discussed with patient whom consented to blood products. Plan discussed with CRNA and attending.                   Izabella Serrano DO   7/2/2018

## 2018-07-02 NOTE — OP NOTE
PREOPERATIVE DIAGNOSIS:  History of PleurX catheter, no longer draining.     POSTOPERATIVE DIAGNOSIS:  History of PleurX catheter, no longer draining.     INDICATIONS:  History of PleurX catheter, no longer draining.     SURGEON: Rodney De Leon M.D.     ASSISTANT:  None.     COMPLICATIONS:  None, tolerated well.     ESTIMATED BLOOD LOSS:  Less than 50 mL.     ANESTHESIA:  MAC and local.     ANESTHESIA ATTENDING: MEAGHAN Adhikari     SPECIMENS:  None.     OPERATION:  Removal of PleurX catheter.     FINDINGS:  PleurX catheter removed entirely.     HISTORY:  This is a 74-year-old man about 3 weeks ago underwent a VATS  with talc with a PleurX catheter.  It is no longer draining, removal was  indicated.  The patient was described the procedure in full and he agreed  to proceed.     DESCRIPTION OF OPERATION:  After adequate informed consent was obtained and  adequate preoperative antibiotics were given, the patient was brought to  the line room in stable condition.  He was laid in supine position and  induced under MAC anesthesia by the Anesthesia staff. [facility] chest was  prepped and draped in the usual sterile fashion.  Local medication was  injected around the cuff, the suture was cut and using a blunt dissection,  the cuff was freed and the PleurX was removed.  Dry sterile dressing was  applied.  The patient tolerated the procedure well.  The patient  transferred to recovery room in stable condition.  All sponge, instrument,  and needle counts were correct at the end of the case.           Royal Keerthi MD

## 2018-07-02 NOTE — ANESTHESIA POSTPROCEDURE EVALUATION
Department of Anesthesiology  Postprocedure Note    Patient: Jasbir Magallon  MRN: [id number]  YOB: 1940  Date of evaluation: 7/2/2018  Time:  2:58 PM     Procedure Summary     Date:  07/02/18 Room / Location:  [facility]    Anesthesia Start:  1027 Anesthesia Stop:  1101    Procedure:  CATHERTER REMOVAL Diagnosis:      Scheduled Providers:   Responsible Provider:  Sheryle Leventhal, DO    Anesthesia Type:  MAC ASA Status:  3          Anesthesia Type: MAC    Madhavi Phase I: Madhavi Score: 10    Madhavi Phase II: Madhavi Score: 10    Last vitals: Reviewed and per EMR flowsheets.        Anesthesia Post Evaluation    Patient location during evaluation: PACU  Patient participation: complete - patient participated  Level of consciousness: awake and alert  Pain score: 2  Airway patency: patent  Nausea & Vomiting: no nausea and no vomiting  Complications: no  Cardiovascular status: blood pressure returned to baseline and hemodynamically stable  Respiratory status: acceptable  Hydration status: euvolemic

## 2018-07-16 LAB
FUNGUS (MYCOLOGY) CULTURE: NORMAL
FUNGUS STAIN: NORMAL

## 2018-09-10 ENCOUNTER — TELEPHONE (OUTPATIENT)
Dept: CARDIOTHORACIC SURGERY | Age: 78
End: 2018-09-10

## 2018-09-10 NOTE — TELEPHONE ENCOUNTER
Pts wife called they are in [facility]. She states that since surgery her  has had a pain on left side that runs up to his shoulder. The pain medication doesn't help. She was wondering what the cause of this pain would be. There aren't any Drs on [address] [facility] they are on. She is just looking for answers. Please advise.

## 2018-10-17 ENCOUNTER — APPOINTMENT (OUTPATIENT)
Dept: ULTRASOUND IMAGING | Age: 78
DRG: 166 | End: 2018-10-17
Payer: MEDICARE

## 2018-10-17 ENCOUNTER — APPOINTMENT (OUTPATIENT)
Dept: GENERAL RADIOLOGY | Age: 78
DRG: 166 | End: 2018-10-17
Payer: MEDICARE

## 2018-10-17 ENCOUNTER — APPOINTMENT (OUTPATIENT)
Dept: CT IMAGING | Age: 78
DRG: 166 | End: 2018-10-17
Payer: MEDICARE

## 2018-10-17 ENCOUNTER — HOSPITAL ENCOUNTER (INPATIENT)
Age: 78
LOS: 14 days | Discharge: HOME HEALTH | DRG: 166 | End: 2018-10-31
Attending: EMERGENCY MEDICINE | Admitting: INTERNAL MEDICINE
Payer: MEDICARE

## 2018-10-17 DIAGNOSIS — Z51.5 PALLIATIVE CARE ENCOUNTER: ICD-10-CM

## 2018-10-17 DIAGNOSIS — E87.6 HYPOKALEMIA: ICD-10-CM

## 2018-10-17 DIAGNOSIS — I26.99 ACUTE PULMONARY THROMBOEMBOLISM (HCC): Primary | ICD-10-CM

## 2018-10-17 LAB
ALBUMIN SERPL-MCNC: 2.5 G/DL (ref 3.5–5.2)
ALP BLD-CCNC: 454 U/L (ref 40–129)
ALT SERPL-CCNC: 31 U/L (ref 0–40)
ANION GAP SERPL CALCULATED.3IONS-SCNC: 11 MMOL/L (ref 7–16)
APTT: 33.1 SEC (ref 24.5–35.1)
APTT: 37.3 SEC (ref 24.5–35.1)
AST SERPL-CCNC: 37 U/L (ref 0–39)
BASOPHILS ABSOLUTE: 0.12 E9/L (ref 0–0.2)
BASOPHILS RELATIVE PERCENT: 0.8 % (ref 0–2)
BILIRUB SERPL-MCNC: 1 MG/DL (ref 0–1.2)
BUN BLDV-MCNC: 12 MG/DL (ref 8–23)
CALCIUM SERPL-MCNC: 11.2 MG/DL (ref 8.6–10.2)
CHLORIDE BLD-SCNC: 97 MMOL/L (ref 98–107)
CO2: 34 MMOL/L (ref 22–29)
CREAT SERPL-MCNC: 0.6 MG/DL (ref 0.7–1.2)
EKG ATRIAL RATE: 121 BPM
EKG P AXIS: 56 DEGREES
EKG P-R INTERVAL: 150 MS
EKG Q-T INTERVAL: 306 MS
EKG QRS DURATION: 86 MS
EKG QTC CALCULATION (BAZETT): 434 MS
EKG R AXIS: -62 DEGREES
EKG T AXIS: 51 DEGREES
EKG VENTRICULAR RATE: 121 BPM
EOSINOPHILS ABSOLUTE: 1.41 E9/L (ref 0.05–0.5)
EOSINOPHILS RELATIVE PERCENT: 9.6 % (ref 0–6)
FILM ARRAY ADENOVIRUS: NORMAL
FILM ARRAY BORDETELLA PERTUSSIS: NORMAL
FILM ARRAY CHLAMYDOPHILIA PNEUMONIAE: NORMAL
FILM ARRAY CORONAVIRUS 229E: NORMAL
FILM ARRAY CORONAVIRUS HKU1: NORMAL
FILM ARRAY CORONAVIRUS NL63: NORMAL
FILM ARRAY CORONAVIRUS OC43: NORMAL
FILM ARRAY INFLUENZA A VIRUS 09H1: NORMAL
FILM ARRAY INFLUENZA A VIRUS H1: NORMAL
FILM ARRAY INFLUENZA A VIRUS H3: NORMAL
FILM ARRAY INFLUENZA A VIRUS: NORMAL
FILM ARRAY INFLUENZA B: NORMAL
FILM ARRAY METAPNEUMOVIRUS: NORMAL
FILM ARRAY MYCOPLASMA PNEUMONIAE: NORMAL
FILM ARRAY PARAINFLUENZA VIRUS 1: NORMAL
FILM ARRAY PARAINFLUENZA VIRUS 2: NORMAL
FILM ARRAY PARAINFLUENZA VIRUS 3: NORMAL
FILM ARRAY PARAINFLUENZA VIRUS 4: NORMAL
FILM ARRAY RESPIRATORY SYNCITIAL VIRUS: NORMAL
FILM ARRAY RHINOVIRUS/ENTEROVIRUS: NORMAL
GFR AFRICAN AMERICAN: >60
GFR AFRICAN AMERICAN: >60
GFR NON-AFRICAN AMERICAN: >60 ML/MIN/1.73
GFR NON-AFRICAN AMERICAN: >60 ML/MIN/1.73
GLUCOSE BLD-MCNC: 90 MG/DL (ref 74–109)
GLUCOSE BLD-MCNC: 91 MG/DL (ref 74–109)
HCT VFR BLD CALC: 33.7 % (ref 37–54)
HEMOGLOBIN: 10.3 G/DL (ref 12.5–16.5)
IMMATURE GRANULOCYTES #: 0.12 E9/L
IMMATURE GRANULOCYTES %: 0.8 % (ref 0–5)
INR BLD: 1.4
LACTIC ACID: 1.7 MMOL/L (ref 0.5–2.2)
LYMPHOCYTES ABSOLUTE: 1.66 E9/L (ref 1.5–4)
LYMPHOCYTES RELATIVE PERCENT: 11.3 % (ref 20–42)
MCH RBC QN AUTO: 24.8 PG (ref 26–35)
MCHC RBC AUTO-ENTMCNC: 30.6 % (ref 32–34.5)
MCV RBC AUTO: 81 FL (ref 80–99.9)
MONOCYTES ABSOLUTE: 0.76 E9/L (ref 0.1–0.95)
MONOCYTES RELATIVE PERCENT: 5.2 % (ref 2–12)
NEUTROPHILS ABSOLUTE: 10.61 E9/L (ref 1.8–7.3)
NEUTROPHILS RELATIVE PERCENT: 72.3 % (ref 43–80)
PDW BLD-RTO: 18.5 FL (ref 11.5–15)
PLATELET # BLD: 518 E9/L (ref 130–450)
PMV BLD AUTO: 9.2 FL (ref 7–12)
POC CHLORIDE: 98 MMOL/L (ref 100–108)
POC CREATININE: 0.4 MG/DL (ref 0.7–1.2)
POC POTASSIUM: 3.1 MMOL/L (ref 3.5–5)
POC SODIUM: 144 MMOL/L (ref 132–146)
POTASSIUM SERPL-SCNC: 3.2 MMOL/L (ref 3.5–5)
PRO-BNP: 986 PG/ML (ref 0–450)
PROCALCITONIN: 0.13 NG/ML (ref 0–0.08)
PROCALCITONIN: 0.15 NG/ML (ref 0–0.08)
PROTHROMBIN TIME: 16 SEC (ref 9.3–12.4)
RBC # BLD: 4.16 E12/L (ref 3.8–5.8)
SODIUM BLD-SCNC: 142 MMOL/L (ref 132–146)
TOTAL PROTEIN: 7.4 G/DL (ref 6.4–8.3)
TROPONIN: <0.01 NG/ML (ref 0–0.03)
WBC # BLD: 14.7 E9/L (ref 4.5–11.5)

## 2018-10-17 PROCEDURE — 85610 PROTHROMBIN TIME: CPT

## 2018-10-17 PROCEDURE — 96374 THER/PROPH/DIAG INJ IV PUSH: CPT

## 2018-10-17 PROCEDURE — 85730 THROMBOPLASTIN TIME PARTIAL: CPT

## 2018-10-17 PROCEDURE — 6360000004 HC RX CONTRAST MEDICATION: Performed by: RADIOLOGY

## 2018-10-17 PROCEDURE — 2580000003 HC RX 258: Performed by: RADIOLOGY

## 2018-10-17 PROCEDURE — 87486 CHLMYD PNEUM DNA AMP PROBE: CPT

## 2018-10-17 PROCEDURE — 2700000000 HC OXYGEN THERAPY PER DAY

## 2018-10-17 PROCEDURE — 99222 1ST HOSP IP/OBS MODERATE 55: CPT | Performed by: SURGERY

## 2018-10-17 PROCEDURE — 6360000002 HC RX W HCPCS: Performed by: EMERGENCY MEDICINE

## 2018-10-17 PROCEDURE — 82435 ASSAY OF BLOOD CHLORIDE: CPT

## 2018-10-17 PROCEDURE — 80053 COMPREHEN METABOLIC PANEL: CPT

## 2018-10-17 PROCEDURE — 83880 ASSAY OF NATRIURETIC PEPTIDE: CPT

## 2018-10-17 PROCEDURE — 84132 ASSAY OF SERUM POTASSIUM: CPT

## 2018-10-17 PROCEDURE — 85025 COMPLETE CBC W/AUTO DIFF WBC: CPT

## 2018-10-17 PROCEDURE — 6370000000 HC RX 637 (ALT 250 FOR IP): Performed by: EMERGENCY MEDICINE

## 2018-10-17 PROCEDURE — 87633 RESP VIRUS 12-25 TARGETS: CPT

## 2018-10-17 PROCEDURE — 71045 X-RAY EXAM CHEST 1 VIEW: CPT

## 2018-10-17 PROCEDURE — 93970 EXTREMITY STUDY: CPT

## 2018-10-17 PROCEDURE — 84145 PROCALCITONIN (PCT): CPT

## 2018-10-17 PROCEDURE — 6370000000 HC RX 637 (ALT 250 FOR IP): Performed by: INTERNAL MEDICINE

## 2018-10-17 PROCEDURE — 2580000003 HC RX 258: Performed by: EMERGENCY MEDICINE

## 2018-10-17 PROCEDURE — 82947 ASSAY GLUCOSE BLOOD QUANT: CPT

## 2018-10-17 PROCEDURE — 87798 DETECT AGENT NOS DNA AMP: CPT

## 2018-10-17 PROCEDURE — 93005 ELECTROCARDIOGRAM TRACING: CPT | Performed by: EMERGENCY MEDICINE

## 2018-10-17 PROCEDURE — 87581 M.PNEUMON DNA AMP PROBE: CPT

## 2018-10-17 PROCEDURE — 87040 BLOOD CULTURE FOR BACTERIA: CPT

## 2018-10-17 PROCEDURE — 84484 ASSAY OF TROPONIN QUANT: CPT

## 2018-10-17 PROCEDURE — 99285 EMERGENCY DEPT VISIT HI MDM: CPT

## 2018-10-17 PROCEDURE — 6360000002 HC RX W HCPCS: Performed by: INTERNAL MEDICINE

## 2018-10-17 PROCEDURE — 84295 ASSAY OF SERUM SODIUM: CPT

## 2018-10-17 PROCEDURE — 96375 TX/PRO/DX INJ NEW DRUG ADDON: CPT

## 2018-10-17 PROCEDURE — 2580000003 HC RX 258: Performed by: INTERNAL MEDICINE

## 2018-10-17 PROCEDURE — 36415 COLL VENOUS BLD VENIPUNCTURE: CPT

## 2018-10-17 PROCEDURE — 83605 ASSAY OF LACTIC ACID: CPT

## 2018-10-17 PROCEDURE — 71275 CT ANGIOGRAPHY CHEST: CPT

## 2018-10-17 PROCEDURE — 2060000000 HC ICU INTERMEDIATE R&B

## 2018-10-17 PROCEDURE — 82565 ASSAY OF CREATININE: CPT

## 2018-10-17 PROCEDURE — 74177 CT ABD & PELVIS W/CONTRAST: CPT

## 2018-10-17 RX ORDER — ACETAMINOPHEN 325 MG/1
650 TABLET ORAL EVERY 4 HOURS PRN
Status: DISCONTINUED | OUTPATIENT
Start: 2018-10-17 | End: 2018-10-31 | Stop reason: HOSPADM

## 2018-10-17 RX ORDER — HEPARIN SODIUM 1000 [USP'U]/ML
40 INJECTION, SOLUTION INTRAVENOUS; SUBCUTANEOUS PRN
Status: DISCONTINUED | OUTPATIENT
Start: 2018-10-17 | End: 2018-10-22

## 2018-10-17 RX ORDER — SUCCINYLCHOLINE CHLORIDE 20 MG/ML
120 INJECTION INTRAMUSCULAR; INTRAVENOUS ONCE
Status: DISCONTINUED | OUTPATIENT
Start: 2018-10-17 | End: 2018-10-17

## 2018-10-17 RX ORDER — SODIUM CHLORIDE 0.9 % (FLUSH) 0.9 %
10 SYRINGE (ML) INJECTION PRN
Status: DISCONTINUED | OUTPATIENT
Start: 2018-10-17 | End: 2018-10-31 | Stop reason: HOSPADM

## 2018-10-17 RX ORDER — GABAPENTIN 300 MG/1
1 CAPSULE ORAL 3 TIMES DAILY
COMMUNITY
Start: 2018-10-16

## 2018-10-17 RX ORDER — 0.9 % SODIUM CHLORIDE 0.9 %
1000 INTRAVENOUS SOLUTION INTRAVENOUS ONCE
Status: COMPLETED | OUTPATIENT
Start: 2018-10-17 | End: 2018-10-17

## 2018-10-17 RX ORDER — GABAPENTIN 300 MG/1
300 CAPSULE ORAL 3 TIMES DAILY
Status: DISCONTINUED | OUTPATIENT
Start: 2018-10-17 | End: 2018-10-31 | Stop reason: HOSPADM

## 2018-10-17 RX ORDER — HEPARIN SODIUM 10000 [USP'U]/100ML
18 INJECTION, SOLUTION INTRAVENOUS CONTINUOUS
Status: DISCONTINUED | OUTPATIENT
Start: 2018-10-17 | End: 2018-10-19 | Stop reason: SDUPTHER

## 2018-10-17 RX ORDER — SODIUM CHLORIDE 0.9 % (FLUSH) 0.9 %
10 SYRINGE (ML) INJECTION EVERY 12 HOURS SCHEDULED
Status: DISCONTINUED | OUTPATIENT
Start: 2018-10-17 | End: 2018-10-31 | Stop reason: HOSPADM

## 2018-10-17 RX ORDER — MORPHINE SULFATE 4 MG/ML
4 INJECTION, SOLUTION INTRAMUSCULAR; INTRAVENOUS ONCE
Status: COMPLETED | OUTPATIENT
Start: 2018-10-17 | End: 2018-10-17

## 2018-10-17 RX ORDER — SODIUM CHLORIDE 0.9 % (FLUSH) 0.9 %
10 SYRINGE (ML) INJECTION ONCE
Status: COMPLETED | OUTPATIENT
Start: 2018-10-17 | End: 2018-10-17

## 2018-10-17 RX ORDER — SODIUM CHLORIDE 0.9 % (FLUSH) 0.9 %
10 SYRINGE (ML) INJECTION PRN
Status: DISCONTINUED | OUTPATIENT
Start: 2018-10-17 | End: 2018-10-22 | Stop reason: SDUPTHER

## 2018-10-17 RX ORDER — DOCUSATE SODIUM 100 MG/1
100 CAPSULE, LIQUID FILLED ORAL 2 TIMES DAILY PRN
Status: DISCONTINUED | OUTPATIENT
Start: 2018-10-17 | End: 2018-10-31 | Stop reason: HOSPADM

## 2018-10-17 RX ORDER — OXYCODONE HYDROCHLORIDE AND ACETAMINOPHEN 5; 325 MG/1; MG/1
1 TABLET ORAL
Status: ON HOLD | COMMUNITY
Start: 2018-10-16 | End: 2018-10-30 | Stop reason: HOSPADM

## 2018-10-17 RX ORDER — HEPARIN SODIUM 1000 [USP'U]/ML
80 INJECTION, SOLUTION INTRAVENOUS; SUBCUTANEOUS ONCE
Status: COMPLETED | OUTPATIENT
Start: 2018-10-17 | End: 2018-10-17

## 2018-10-17 RX ORDER — PANTOPRAZOLE SODIUM 40 MG/1
40 TABLET, DELAYED RELEASE ORAL DAILY
Status: DISCONTINUED | OUTPATIENT
Start: 2018-10-17 | End: 2018-10-31 | Stop reason: HOSPADM

## 2018-10-17 RX ORDER — PROPOFOL 10 MG/ML
10 INJECTION, EMULSION INTRAVENOUS ONCE
Status: DISCONTINUED | OUTPATIENT
Start: 2018-10-17 | End: 2018-10-17

## 2018-10-17 RX ORDER — OXYCODONE HYDROCHLORIDE AND ACETAMINOPHEN 5; 325 MG/1; MG/1
1 TABLET ORAL EVERY 6 HOURS PRN
Status: DISCONTINUED | OUTPATIENT
Start: 2018-10-17 | End: 2018-10-18

## 2018-10-17 RX ORDER — ASPIRIN 81 MG/1
81 TABLET ORAL DAILY
Status: DISCONTINUED | OUTPATIENT
Start: 2018-10-17 | End: 2018-10-31 | Stop reason: HOSPADM

## 2018-10-17 RX ORDER — ONDANSETRON 2 MG/ML
4 INJECTION INTRAMUSCULAR; INTRAVENOUS ONCE
Status: COMPLETED | OUTPATIENT
Start: 2018-10-17 | End: 2018-10-17

## 2018-10-17 RX ORDER — ONDANSETRON 2 MG/ML
4 INJECTION INTRAMUSCULAR; INTRAVENOUS EVERY 6 HOURS PRN
Status: DISCONTINUED | OUTPATIENT
Start: 2018-10-17 | End: 2018-10-31 | Stop reason: HOSPADM

## 2018-10-17 RX ORDER — M-VIT,TX,IRON,MINS/CALC/FOLIC 27MG-0.4MG
1 TABLET ORAL DAILY
Status: DISCONTINUED | OUTPATIENT
Start: 2018-10-17 | End: 2018-10-31 | Stop reason: HOSPADM

## 2018-10-17 RX ORDER — ATORVASTATIN CALCIUM 10 MG/1
10 TABLET, FILM COATED ORAL DAILY
Status: DISCONTINUED | OUTPATIENT
Start: 2018-10-17 | End: 2018-10-31 | Stop reason: HOSPADM

## 2018-10-17 RX ORDER — POTASSIUM CHLORIDE 20 MEQ/1
40 TABLET, EXTENDED RELEASE ORAL ONCE
Status: COMPLETED | OUTPATIENT
Start: 2018-10-17 | End: 2018-10-17

## 2018-10-17 RX ORDER — HEPARIN SODIUM 1000 [USP'U]/ML
80 INJECTION, SOLUTION INTRAVENOUS; SUBCUTANEOUS PRN
Status: DISCONTINUED | OUTPATIENT
Start: 2018-10-17 | End: 2018-10-22

## 2018-10-17 RX ORDER — ETOMIDATE 2 MG/ML
20 INJECTION INTRAVENOUS ONCE
Status: DISCONTINUED | OUTPATIENT
Start: 2018-10-17 | End: 2018-10-17

## 2018-10-17 RX ORDER — SODIUM CHLORIDE 0.9 % (FLUSH) 0.9 %
10 SYRINGE (ML) INJECTION EVERY 12 HOURS SCHEDULED
Status: DISCONTINUED | OUTPATIENT
Start: 2018-10-17 | End: 2018-10-22 | Stop reason: SDUPTHER

## 2018-10-17 RX ADMIN — SODIUM CHLORIDE 1000 ML: 9 INJECTION, SOLUTION INTRAVENOUS at 10:32

## 2018-10-17 RX ADMIN — ENOXAPARIN SODIUM 80 MG: 80 INJECTION, SOLUTION INTRAVENOUS; SUBCUTANEOUS at 10:32

## 2018-10-17 RX ADMIN — DOCUSATE SODIUM 100 MG: 100 CAPSULE, LIQUID FILLED ORAL at 15:46

## 2018-10-17 RX ADMIN — ASPIRIN 81 MG: 81 TABLET ORAL at 15:46

## 2018-10-17 RX ADMIN — Medication 10 ML: at 09:09

## 2018-10-17 RX ADMIN — ATORVASTATIN CALCIUM 10 MG: 10 TABLET, FILM COATED ORAL at 15:46

## 2018-10-17 RX ADMIN — LINACLOTIDE 290 MCG: 145 CAPSULE, GELATIN COATED ORAL at 17:34

## 2018-10-17 RX ADMIN — POTASSIUM CHLORIDE 40 MEQ: 20 TABLET, EXTENDED RELEASE ORAL at 10:10

## 2018-10-17 RX ADMIN — IOPAMIDOL 110 ML: 755 INJECTION, SOLUTION INTRAVENOUS at 09:08

## 2018-10-17 RX ADMIN — HEPARIN SODIUM AND DEXTROSE 18 UNITS/KG/HR: 10000; 5 INJECTION INTRAVENOUS at 21:21

## 2018-10-17 RX ADMIN — GABAPENTIN 300 MG: 300 CAPSULE ORAL at 15:46

## 2018-10-17 RX ADMIN — Medication 10 ML: at 20:06

## 2018-10-17 RX ADMIN — Medication 10 ML: at 23:10

## 2018-10-17 RX ADMIN — HEPARIN SODIUM 6780 UNITS: 1000 INJECTION INTRAVENOUS; SUBCUTANEOUS at 21:19

## 2018-10-17 RX ADMIN — MULTIPLE VITAMINS W/ MINERALS TAB 1 TABLET: TAB at 15:46

## 2018-10-17 RX ADMIN — SODIUM CHLORIDE 1000 ML: 9 INJECTION, SOLUTION INTRAVENOUS at 08:31

## 2018-10-17 RX ADMIN — MORPHINE SULFATE 4 MG: 4 INJECTION INTRAVENOUS at 10:06

## 2018-10-17 RX ADMIN — GABAPENTIN 300 MG: 300 CAPSULE ORAL at 20:01

## 2018-10-17 RX ADMIN — ONDANSETRON 4 MG: 2 INJECTION INTRAMUSCULAR; INTRAVENOUS at 10:06

## 2018-10-17 RX ADMIN — PANTOPRAZOLE SODIUM 40 MG: 40 TABLET, DELAYED RELEASE ORAL at 15:46

## 2018-10-17 RX ADMIN — Medication 10 ML: at 20:02

## 2018-10-17 ASSESSMENT — ENCOUNTER SYMPTOMS
WHEEZING: 0
ABDOMINAL PAIN: 0
NAUSEA: 0
BACK PAIN: 0
COUGH: 0
CONSTIPATION: 0
SPUTUM PRODUCTION: 0
SHORTNESS OF BREATH: 1
DIARRHEA: 0
BLOOD IN STOOL: 0
VOMITING: 0

## 2018-10-17 ASSESSMENT — PAIN SCALES - WONG BAKER: WONGBAKER_NUMERICALRESPONSE: 8

## 2018-10-17 ASSESSMENT — PAIN DESCRIPTION - PAIN TYPE: TYPE: ACUTE PAIN

## 2018-10-17 ASSESSMENT — PAIN DESCRIPTION - LOCATION: LOCATION: CHEST;FLANK

## 2018-10-17 ASSESSMENT — PAIN SCALES - GENERAL: PAINLEVEL_OUTOF10: 7

## 2018-10-17 ASSESSMENT — PAIN DESCRIPTION - ORIENTATION: ORIENTATION: LEFT

## 2018-10-17 NOTE — CONSULTS
Ranjana Dawson M.D.,[facility]  Fran Sesay D.O., F.BRENTON.OLUISA., Alison Frias M.D. Peewee Guzman M.D., Jaciel Clay M.D. Patient:  Chavez Mcnamara 65 y.o. male MRN: [id number]     Date of Service: 10/17/2018      PULMONARY CONSULTATION    Reason for Consultation: Acute PE   Referring Physician: Dr. Mel Kelly with the referring physician will be sent via the electronic medical record. Chief Complaint:     SUBJECTIVE:    HPI:  He returned from [facility] on 10/16/18 cutting his trip short due to worsening symptoms of shortness of breath and left anterolateral and posterior pain around ribs radiating to his back. Pain is worse on inspiration. He states he has had this pain for a long time since his previous VATs surgery. He is feeling more weak, having trouble walking, and fell at home last night at 3 am. He did not hit his head or lose consciousness. He is becoming more weak and increased in fatigue for past 6 weeks . Other symptoms include decreased appetite, and unintentional weight loss of 17 lbs. He denies cough, sputum, fever, chills, nausea, or emesis. He presented to the ED at [facility] yesterday. A CTA chest was obtained. There are filling defects noted in the distal segmental and peripheral branches of the right lower lobe of lung, concerning for small PE in distal vessels. We are consulted for acute PE and make recommendations for his ongoing care . He is a relatively good historian, but [place] and speaks broken english. There are some communication barriers. He is alert and oriented. He was last seen by our service on 7/3/2018 for hospital f/u status post left VATS with pleural biopsy and drainage of empyema on June 12, 2018  with insertion of left pleur X catheter. Pathology was benign. Cultures were negative.  His  respiratory history is also significant for hypoxic respiratory failure, left recurrent pleural effusion requiring thoracentesis, fluid results were K 3.2 10/17/2018    K 4.7 06/07/2018    CL 97 10/17/2018    CO2 34 10/17/2018    BUN 12 10/17/2018    CREATININE 0.6 10/17/2018    LABALBU 2.5 10/17/2018    CALCIUM 11.2 10/17/2018    GFRAA >60 10/17/2018    LABGLOM >60 10/17/2018     Lab Results   Component Value Date    PROTIME 16.0 10/17/2018    INR 1.4 10/17/2018     Recent Labs      10/17/18   0830   PROBNP  986*     Recent Labs      10/17/18   0830   TROPONINI  <0.01     No results for input(s): PROCAL in the last 72 hours. This SmartLink has not been configured with any valid records. Micro:  None     Assessment:  1. Acute PE   2. Hypoxic respiratory failure related to above   3. Centrilobular emphysema   4. COPD GOLD Stage 1  5. 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 Unintentional 17 lb weight loss  7. Cigarette nicotine dependence - quit 4 months ago  8. Hx left VATS with  pleural biopsy 6/12/18,drainage of empyema and  insertion of pleur x, drain removed on 7/2/18    Plan:  1. Oxygen therapy wean to keep >92%  2. Full dose Lovenox 1 mg/kg BID  3. Check 2 D echo to assess RV  3. BLE dopplers r/o DVT  4. Striverdi Respimat, Spiriva handihaler. Can resume Anoro upon discharge  5. Check respiratory cultures, film array, procalcitonin  6. Vascular surgery consult  7. Check Bone Scan, evidence of osteolytic lesions concerning for bone metastasis  8. Consult cardiothoracic surgery   9. Evidence of asbestos exposure with pleural plaques on CT scan. Consider send out of previous pleural biopsy to dedicated pulmonary pathologist to evaluate for mesothelioma        Thank you for allowing me to participate in the care of Tacos Zhou. Please feel free to call with questions. Electronically signed by SUNSHINE Bernal on 10/17/2018 at 2:23 PM    I personally saw, examined, and cared for the patient. Labs, medications, radiographs reviewed. I agree with history exam and plans detailed in NP note.   Unfortunately overall picture

## 2018-10-17 NOTE — PROGRESS NOTES
Pulmonary consult placed via perfect serve to Dr Lewis Bonds. Dr Beryl Juan on call. Vascular consult placed via perfect serve to Dr Tomas Fernandes. Dr Jose Begum on call.

## 2018-10-17 NOTE — CONSULTS
vomiting; no abdominal pain or rectal bleeding  Musculoskeletal:  No arthritis or weakness  Neurologic:  No paralysis, paresis, seizures or headaches  Hematologic/Lymphatic/Immunologic:  No anemia, abnormal bleeding/bruising  Endocrine:  No heat or cold intolerance. No polyphagia, polydipsia or polyuria. Physical Exam:  BP (!) 158/89   Pulse 105   Temp 98.9 °F (37.2 °C) (Temporal)   Resp 15   Ht 5' 6\" (1.676 m)   Wt 187 lb (84.8 kg)   SpO2 98%   BMI 30.18 kg/m²   General appearance:  Sleeping, frail-looking on supplemental oxygen oriented x 3. No distress  Skin:  Warm and dry  Head:  Normocephalic. No masses, lesions or tenderness  Eyes:  Conjunctivae appear normal; PERRL  Ears:  External ears normal  Nose/Sinuses:  Septum midline, mucosa normal; no drainage  Oropharynx:  Clear, no exudate noted      Neck:  No jugular venous distention, lymphadenopathy or thyromegaly. No evidence of carotid bruit    Lungs:  Clear to ausculation bilaterally. No rhonchi, crackles, wheezes  Heart:  Regular rate and rhythm. No rub or murmur    Abdomen:  Soft, non-tender. No masses, organomegaly. Musculoskeletal: No joint effusions, tenderness swelling or warmth        Neuro: Speech is intact. Moving all extremities. No focal motor or sensory deficits          Extremities:  Both feet are warm to touch. The color of both feet is normal        Pulses Right  Left    Brachial 3 3    Radial    3=normal   Femoral 2 2  2=diminished   Popliteal    1=barely palpable   Dorsalis pedis    0=absent   Posterior tibial 0 0  4=aneurysmal           Other pertinent information:1. The past medical records were reviewed. 2.  The CT scan of abdomen and pelvis was reviewed, stable endovascular stent graft repair without endoleak    3. The CTA chest was reviewed personally, reveals small filling defects in the right lower lobe, reviewed with Dr. Miguel Spaulding    4.   The past workup done in January, ankle-brachial index of 0.48 on the right

## 2018-10-18 ENCOUNTER — APPOINTMENT (OUTPATIENT)
Dept: NUCLEAR MEDICINE | Age: 78
DRG: 166 | End: 2018-10-18
Payer: MEDICARE

## 2018-10-18 PROBLEM — E43 SEVERE PROTEIN-CALORIE MALNUTRITION (HCC): Status: ACTIVE | Noted: 2018-10-18

## 2018-10-18 LAB
APTT: 79.3 SEC (ref 24.5–35.1)
BASOPHILS ABSOLUTE: 0.08 E9/L (ref 0–0.2)
BASOPHILS RELATIVE PERCENT: 0.9 % (ref 0–2)
CEA: 3.4 NG/ML (ref 0–5.2)
EOSINOPHILS ABSOLUTE: 0.44 E9/L (ref 0.05–0.5)
EOSINOPHILS RELATIVE PERCENT: 4.8 % (ref 0–6)
HCT VFR BLD CALC: 28.3 % (ref 37–54)
HEMOGLOBIN: 8.7 G/DL (ref 12.5–16.5)
IMMATURE GRANULOCYTES #: 0.08 E9/L
IMMATURE GRANULOCYTES %: 0.9 % (ref 0–5)
INR BLD: 1.5
LV EF: 60 %
LVEF MODALITY: NORMAL
LYMPHOCYTES ABSOLUTE: 1.47 E9/L (ref 1.5–4)
LYMPHOCYTES RELATIVE PERCENT: 16.2 % (ref 20–42)
MCH RBC QN AUTO: 24.4 PG (ref 26–35)
MCHC RBC AUTO-ENTMCNC: 30.7 % (ref 32–34.5)
MCV RBC AUTO: 79.5 FL (ref 80–99.9)
MONOCYTES ABSOLUTE: 0.64 E9/L (ref 0.1–0.95)
MONOCYTES RELATIVE PERCENT: 7 % (ref 2–12)
NEUTROPHILS ABSOLUTE: 6.38 E9/L (ref 1.8–7.3)
NEUTROPHILS RELATIVE PERCENT: 70.2 % (ref 43–80)
PDW BLD-RTO: 18.1 FL (ref 11.5–15)
PLATELET # BLD: 410 E9/L (ref 130–450)
PMV BLD AUTO: 9 FL (ref 7–12)
PROTHROMBIN TIME: 16.7 SEC (ref 9.3–12.4)
RBC # BLD: 3.56 E12/L (ref 3.8–5.8)
WBC # BLD: 9.1 E9/L (ref 4.5–11.5)

## 2018-10-18 PROCEDURE — 99222 1ST HOSP IP/OBS MODERATE 55: CPT | Performed by: THORACIC SURGERY (CARDIOTHORACIC VASCULAR SURGERY)

## 2018-10-18 PROCEDURE — 3430000000 HC RX DIAGNOSTIC RADIOPHARMACEUTICAL: Performed by: RADIOLOGY

## 2018-10-18 PROCEDURE — 2060000000 HC ICU INTERMEDIATE R&B

## 2018-10-18 PROCEDURE — 6360000002 HC RX W HCPCS: Performed by: INTERNAL MEDICINE

## 2018-10-18 PROCEDURE — 6370000000 HC RX 637 (ALT 250 FOR IP): Performed by: INTERNAL MEDICINE

## 2018-10-18 PROCEDURE — 85610 PROTHROMBIN TIME: CPT

## 2018-10-18 PROCEDURE — 85730 THROMBOPLASTIN TIME PARTIAL: CPT

## 2018-10-18 PROCEDURE — 82378 CARCINOEMBRYONIC ANTIGEN: CPT

## 2018-10-18 PROCEDURE — A9503 TC99M MEDRONATE: HCPCS | Performed by: RADIOLOGY

## 2018-10-18 PROCEDURE — 93306 TTE W/DOPPLER COMPLETE: CPT

## 2018-10-18 PROCEDURE — 85025 COMPLETE CBC W/AUTO DIFF WBC: CPT

## 2018-10-18 PROCEDURE — 36415 COLL VENOUS BLD VENIPUNCTURE: CPT

## 2018-10-18 PROCEDURE — 78306 BONE IMAGING WHOLE BODY: CPT

## 2018-10-18 PROCEDURE — B246ZZ4 ULTRASONOGRAPHY OF RIGHT AND LEFT HEART, TRANSESOPHAGEAL: ICD-10-PCS | Performed by: INTERNAL MEDICINE

## 2018-10-18 PROCEDURE — 2700000000 HC OXYGEN THERAPY PER DAY

## 2018-10-18 RX ORDER — FENTANYL CITRATE 50 UG/ML
50 INJECTION, SOLUTION INTRAMUSCULAR; INTRAVENOUS ONCE
Status: COMPLETED | OUTPATIENT
Start: 2018-10-18 | End: 2018-10-18

## 2018-10-18 RX ORDER — OXYCODONE HYDROCHLORIDE AND ACETAMINOPHEN 5; 325 MG/1; MG/1
1 TABLET ORAL EVERY 6 HOURS PRN
Status: DISCONTINUED | OUTPATIENT
Start: 2018-10-18 | End: 2018-10-19

## 2018-10-18 RX ORDER — LIDOCAINE 50 MG/G
1 PATCH TOPICAL DAILY
Status: DISCONTINUED | OUTPATIENT
Start: 2018-10-18 | End: 2018-10-31 | Stop reason: HOSPADM

## 2018-10-18 RX ORDER — OXYCODONE HYDROCHLORIDE AND ACETAMINOPHEN 5; 325 MG/1; MG/1
1 TABLET ORAL EVERY 4 HOURS PRN
Status: DISCONTINUED | OUTPATIENT
Start: 2018-10-18 | End: 2018-10-18

## 2018-10-18 RX ORDER — OXYCODONE HYDROCHLORIDE AND ACETAMINOPHEN 5; 325 MG/1; MG/1
2 TABLET ORAL EVERY 6 HOURS PRN
Status: DISCONTINUED | OUTPATIENT
Start: 2018-10-18 | End: 2018-10-19

## 2018-10-18 RX ORDER — TC 99M MEDRONATE 20 MG/10ML
25 INJECTION, POWDER, LYOPHILIZED, FOR SOLUTION INTRAVENOUS ONCE
Status: COMPLETED | OUTPATIENT
Start: 2018-10-18 | End: 2018-10-18

## 2018-10-18 RX ADMIN — OXYCODONE AND ACETAMINOPHEN 2 TABLET: 5; 325 TABLET ORAL at 19:54

## 2018-10-18 RX ADMIN — OXYCODONE AND ACETAMINOPHEN 2 TABLET: 5; 325 TABLET ORAL at 13:31

## 2018-10-18 RX ADMIN — Medication 28 MILLICURIE: at 10:44

## 2018-10-18 RX ADMIN — MULTIPLE VITAMINS W/ MINERALS TAB 1 TABLET: TAB at 08:12

## 2018-10-18 RX ADMIN — FENTANYL CITRATE 50 MCG: 50 INJECTION, SOLUTION INTRAMUSCULAR; INTRAVENOUS at 10:00

## 2018-10-18 RX ADMIN — ASPIRIN 81 MG: 81 TABLET ORAL at 08:12

## 2018-10-18 RX ADMIN — GABAPENTIN 300 MG: 300 CAPSULE ORAL at 22:00

## 2018-10-18 RX ADMIN — OXYCODONE AND ACETAMINOPHEN 1 TABLET: 5; 325 TABLET ORAL at 08:12

## 2018-10-18 RX ADMIN — GABAPENTIN 300 MG: 300 CAPSULE ORAL at 08:12

## 2018-10-18 RX ADMIN — PANTOPRAZOLE SODIUM 40 MG: 40 TABLET, DELAYED RELEASE ORAL at 08:12

## 2018-10-18 RX ADMIN — OLODATEROL RESPIMAT INHALATION SPRAY 2 PUFF: 2.5 SPRAY, METERED RESPIRATORY (INHALATION) at 13:38

## 2018-10-18 RX ADMIN — LINACLOTIDE 290 MCG: 145 CAPSULE, GELATIN COATED ORAL at 13:38

## 2018-10-18 RX ADMIN — GABAPENTIN 300 MG: 300 CAPSULE ORAL at 13:38

## 2018-10-18 RX ADMIN — TIOTROPIUM BROMIDE 18 MCG: 18 CAPSULE ORAL; RESPIRATORY (INHALATION) at 13:38

## 2018-10-18 RX ADMIN — ACETAMINOPHEN 650 MG: 325 TABLET, FILM COATED ORAL at 07:10

## 2018-10-18 RX ADMIN — ACETAMINOPHEN 650 MG: 325 TABLET, FILM COATED ORAL at 02:46

## 2018-10-18 RX ADMIN — HEPARIN SODIUM AND DEXTROSE 18 UNITS/KG/HR: 10000; 5 INJECTION INTRAVENOUS at 10:11

## 2018-10-18 RX ADMIN — ATORVASTATIN CALCIUM 10 MG: 10 TABLET, FILM COATED ORAL at 08:12

## 2018-10-18 ASSESSMENT — PAIN SCALES - GENERAL
PAINLEVEL_OUTOF10: 7
PAINLEVEL_OUTOF10: 8
PAINLEVEL_OUTOF10: 8
PAINLEVEL_OUTOF10: 3
PAINLEVEL_OUTOF10: 6
PAINLEVEL_OUTOF10: 4
PAINLEVEL_OUTOF10: 0
PAINLEVEL_OUTOF10: 10
PAINLEVEL_OUTOF10: 10

## 2018-10-18 ASSESSMENT — PAIN DESCRIPTION - ORIENTATION: ORIENTATION: LEFT

## 2018-10-18 ASSESSMENT — PAIN SCALES - WONG BAKER: WONGBAKER_NUMERICALRESPONSE: 10

## 2018-10-18 ASSESSMENT — PAIN DESCRIPTION - LOCATION: LOCATION: ABDOMEN

## 2018-10-18 ASSESSMENT — PAIN DESCRIPTION - PAIN TYPE: TYPE: ACUTE PAIN

## 2018-10-18 NOTE — CONSULTS
ABDOMINAL AORTIC ANEURYSM REPAIR  12/11/2008    Berny Pryor    APPENDECTOMY      COLONOSCOPY      EYE SURGERY      imelda lense implants    OTHER SURGICAL HISTORY  3-    RLE lysis catheter    OTHER SURGICAL HISTORY  3-    Dr. Kassi Lanza- Angioplasty with stent to right common illiac artery    NV THORSC DX LUNGS/PERICAR/MED/PLEURAL SPACE W/O BX Left 6/11/2018    BRONCH, LEFT VIDEO ASSISTED THORACOSCOPY WITH DECORTICATION performed by Lilla Jeans, MD at [facility]  01/10/2018    cysto retro pyelo for bph       Social History:  Social History     Social History    Marital status:      Spouse name: N/A    Number of children: N/A    Years of education: N/A     Occupational History          Social History Main Topics    Smoking status: Former Smoker     Packs/day: 0.25     Years: 62.00     Types: Cigarettes    Smokeless tobacco: Never Used      Comment: decreasing    Alcohol use 0.6 oz/week     1 Glasses of wine per week    Drug use: No    Sexual activity: Not on file     Other Topics Concern    Not on file     Social History Narrative    No narrative on file       Family History:  Family History   Problem Relation Age of Onset    Stroke Mother     Heart Attack Father     Heart Failure Brother     Cancer Brother     Emphysema Brother     Heart Disease Brother     Diabetes Brother        Review of Systems:  Constitutional: Denies fevers, chills, or weight loss. HEENT: Denies visual changes or hearing loss. Heart: As per HPI. Lungs: Denies shortness of breath, cough, or wheezing. Gastrointestinal: Denies nausea, vomiting, constipation, or diarrhea. Genitourinary: dysuria or hematuria. Psychiatric: Patient denies anxiety or depression. Neurologic: Patient denies weakness of the extremities, dizziness, or headaches. All other ROS checked and found to be negative.     Objective:  Vitals /85   Pulse 95   Temp 97.8 °F (36.6 °C) (Oral)   Resp 20 Ht 5' 6\" (1.676 m)   Wt 187 lb (84.8 kg)   SpO2 99%   BMI 30.18 kg/m²   General Appearance: Pleasant 65y.o. year old male who appears stated age. Communicates well, no acute distress. HEENT: Head is normocephalic, atraumatic. EOMs intact, PERRL. Trachea midline. Lungs: Normal respiratory rate and normal effort. He is not in respiratory distress. Breath sounds clear to auscultation. No wheezes. Decreased on left  Heart: Normal rate. Regular rhythm. S1 normal and S2 normal. Positive for murmur. Chest: Symmetric chest wall expansion. Extremities: Normal range of motion. Neurological: Patient is alert and oriented to person, place and time. Patient has normal reflexes. Skin: Warm and dry. Abdomen: Abdomen is soft and non-distended. Bowel sounds are normal. There is no abdominal tenderness tenderness. There is no guarding. There is no mass. Pulses: Distal pulses are intact. Skin: Warm and dry without lesions. Assessment: Thickened pleura        Plan: His CT looks much worse than in June. He has PE's now, and is on anticoagulation. According to my op note his pleura was markedly abnormal back in June. Unfortunately the biopsy did not give us an answer. He has multiple bone lesions now too. Going back into his chest would no small task. In fact doing so could do more damage than good- and there is no guarantee that doing so would give us an answer. I have decided not to offer him such an operation. I offered a second opinion at another institution. Thank you.   Stefan Harris        Electronically signed by Stefan Harris MD on 10/18/2018 at 9:20 AM

## 2018-10-18 NOTE — PROGRESS NOTES
nodular densities especially in the left lung base measuring up to 2.5 cm. Tiny pleural effusions are also present. Calcified pleural plaques are identified. There are 4 to 5 mm nonspecific pulmonary nodules in the right upper lobe. Grossly, the liver is of normal architecture. Please see the CT report on the abdomen. Note is made of multiple osteolytic destructive lesions involving the ribs and sternum concerning for bone metastasis. No central pulmonary embolism. The peripheral vessels are suboptimally evaluated with  some filling defects in the distal segmental and peripheral branches in the right lower lobe concerning for small PE in the distal vessels. Nodular circumferential pleural thickening in the left hemithorax concerning for malignancy. Consideration should be given to mesothelioma or pleural metastasis. Further assessment is recommended. Multiple osteolytic lesion concerning for bone metastasis and nonspecific pulmonary nodules. Alert. Abnormal report. The findings were conveyed to the emergency physician     Us Dup Lower Extremities Bilateral Venous    Result Date: 10/17/2018  Patient MRN:  [id number] : 1940 Age: 65 years Gender: Male Order Date:  10/17/2018 8:30 PM EXAM: US DUP LOWER EXTREMITIES BILATERAL VENOUS NUMBER OF IMAGES:  39 INDICATION:  CHEST PAIN, ACUTE, PULMONARY EMBOLISM SUSPECTED pain COMPARISON: CT abdomen and runoff images 2017 TECHNIQUE: Venous structures were evaluated for patency with color Doppler imaging, and compressibility was evaluated with 2-D grayscale ultrasound imaging. Response to augmentation maneuvers and respiratory variation was assessed with spectral Doppler. FINDINGS: Both lower extremities were evaluated ultrasonographically There is no evidence for deep venous thrombosis There is good compressibility, there is good augmentation, there is good color flow.      Negative for evidence of deep venous thrombosis in both lower extremities by color and

## 2018-10-19 ENCOUNTER — ANESTHESIA EVENT (OUTPATIENT)
Dept: CT IMAGING | Age: 78
DRG: 166 | End: 2018-10-19
Payer: MEDICARE

## 2018-10-19 ENCOUNTER — ANESTHESIA (OUTPATIENT)
Dept: CT IMAGING | Age: 78
DRG: 166 | End: 2018-10-19
Payer: MEDICARE

## 2018-10-19 ENCOUNTER — APPOINTMENT (OUTPATIENT)
Dept: CT IMAGING | Age: 78
DRG: 166 | End: 2018-10-19
Payer: MEDICARE

## 2018-10-19 VITALS — OXYGEN SATURATION: 100 % | SYSTOLIC BLOOD PRESSURE: 124 MMHG | DIASTOLIC BLOOD PRESSURE: 69 MMHG

## 2018-10-19 LAB
APTT: 40 SEC (ref 24.5–35.1)
APTT: 44.6 SEC (ref 24.5–35.1)
APTT: 55.1 SEC (ref 24.5–35.1)
FERRITIN: 2323 NG/ML
FOLATE: 11 NG/ML (ref 4.8–24.2)
IGA: 290 MG/DL (ref 70–400)
IGG: 1223 MG/DL (ref 700–1600)
IGM: 207 MG/DL (ref 40–230)
IRON: 14 MCG/DL (ref 59–158)
PLATELET # BLD: 460 E9/L (ref 130–450)
VITAMIN B-12: 609 PG/ML (ref 211–946)

## 2018-10-19 PROCEDURE — 6360000002 HC RX W HCPCS: Performed by: INTERNAL MEDICINE

## 2018-10-19 PROCEDURE — 6370000000 HC RX 637 (ALT 250 FOR IP): Performed by: INTERNAL MEDICINE

## 2018-10-19 PROCEDURE — 6370000000 HC RX 637 (ALT 250 FOR IP): Performed by: NURSE PRACTITIONER

## 2018-10-19 PROCEDURE — 77012 CT SCAN FOR NEEDLE BIOPSY: CPT

## 2018-10-19 PROCEDURE — 82784 ASSAY IGA/IGD/IGG/IGM EACH: CPT

## 2018-10-19 PROCEDURE — 82232 ASSAY OF BETA-2 PROTEIN: CPT

## 2018-10-19 PROCEDURE — 85730 THROMBOPLASTIN TIME PARTIAL: CPT

## 2018-10-19 PROCEDURE — 2580000003 HC RX 258: Performed by: INTERNAL MEDICINE

## 2018-10-19 PROCEDURE — 99223 1ST HOSP IP/OBS HIGH 75: CPT | Performed by: NURSE PRACTITIONER

## 2018-10-19 PROCEDURE — 86334 IMMUNOFIX E-PHORESIS SERUM: CPT

## 2018-10-19 PROCEDURE — 36415 COLL VENOUS BLD VENIPUNCTURE: CPT

## 2018-10-19 PROCEDURE — 86038 ANTINUCLEAR ANTIBODIES: CPT

## 2018-10-19 PROCEDURE — 82728 ASSAY OF FERRITIN: CPT

## 2018-10-19 PROCEDURE — 88311 DECALCIFY TISSUE: CPT

## 2018-10-19 PROCEDURE — 84165 PROTEIN E-PHORESIS SERUM: CPT

## 2018-10-19 PROCEDURE — 86039 ANTINUCLEAR ANTIBODIES (ANA): CPT

## 2018-10-19 PROCEDURE — 82746 ASSAY OF FOLIC ACID SERUM: CPT

## 2018-10-19 PROCEDURE — 6360000002 HC RX W HCPCS: Performed by: NURSE ANESTHETIST, CERTIFIED REGISTERED

## 2018-10-19 PROCEDURE — 88307 TISSUE EXAM BY PATHOLOGIST: CPT

## 2018-10-19 PROCEDURE — 2580000003 HC RX 258: Performed by: NURSE ANESTHETIST, CERTIFIED REGISTERED

## 2018-10-19 PROCEDURE — 2060000000 HC ICU INTERMEDIATE R&B

## 2018-10-19 PROCEDURE — 85049 AUTOMATED PLATELET COUNT: CPT

## 2018-10-19 PROCEDURE — 83540 ASSAY OF IRON: CPT

## 2018-10-19 PROCEDURE — 2500000003 HC RX 250 WO HCPCS: Performed by: RADIOLOGY

## 2018-10-19 PROCEDURE — 0PB13ZX EXCISION OF 1 TO 2 RIBS, PERCUTANEOUS APPROACH, DIAGNOSTIC: ICD-10-PCS | Performed by: RADIOLOGY

## 2018-10-19 PROCEDURE — 20220 BONE BIOPSY TROCAR/NDL SUPFC: CPT

## 2018-10-19 PROCEDURE — 82607 VITAMIN B-12: CPT

## 2018-10-19 RX ORDER — LIDOCAINE HYDROCHLORIDE 20 MG/ML
8 INJECTION, SOLUTION INFILTRATION; PERINEURAL ONCE
Status: COMPLETED | OUTPATIENT
Start: 2018-10-19 | End: 2018-10-19

## 2018-10-19 RX ORDER — SODIUM CHLORIDE 9 MG/ML
INJECTION, SOLUTION INTRAVENOUS CONTINUOUS PRN
Status: DISCONTINUED | OUTPATIENT
Start: 2018-10-19 | End: 2018-10-19 | Stop reason: SDUPTHER

## 2018-10-19 RX ORDER — HEPARIN SODIUM 10000 [USP'U]/100ML
18 INJECTION, SOLUTION INTRAVENOUS CONTINUOUS
Status: DISCONTINUED | OUTPATIENT
Start: 2018-10-19 | End: 2018-10-22

## 2018-10-19 RX ORDER — LACTULOSE 10 G/15ML
20 SOLUTION ORAL 2 TIMES DAILY
Status: DISCONTINUED | OUTPATIENT
Start: 2018-10-19 | End: 2018-10-29

## 2018-10-19 RX ORDER — ALPRAZOLAM 0.25 MG/1
0.25 TABLET ORAL 2 TIMES DAILY
Status: DISCONTINUED | OUTPATIENT
Start: 2018-10-19 | End: 2018-10-21

## 2018-10-19 RX ORDER — FENTANYL CITRATE 50 UG/ML
50 INJECTION, SOLUTION INTRAMUSCULAR; INTRAVENOUS EVERY 4 HOURS PRN
Status: DISCONTINUED | OUTPATIENT
Start: 2018-10-19 | End: 2018-10-19

## 2018-10-19 RX ORDER — MORPHINE SULFATE 15 MG/1
7.5 TABLET ORAL EVERY 4 HOURS PRN
Status: DISCONTINUED | OUTPATIENT
Start: 2018-10-19 | End: 2018-10-22

## 2018-10-19 RX ORDER — MORPHINE SULFATE 15 MG/1
15 TABLET ORAL EVERY 4 HOURS PRN
Status: DISCONTINUED | OUTPATIENT
Start: 2018-10-19 | End: 2018-10-22

## 2018-10-19 RX ORDER — PROPOFOL 10 MG/ML
INJECTION, EMULSION INTRAVENOUS PRN
Status: DISCONTINUED | OUTPATIENT
Start: 2018-10-19 | End: 2018-10-19 | Stop reason: SDUPTHER

## 2018-10-19 RX ADMIN — PROPOFOL 30 MG: 10 INJECTION, EMULSION INTRAVENOUS at 14:36

## 2018-10-19 RX ADMIN — LIDOCAINE HYDROCHLORIDE 8 ML: 20 INJECTION, SOLUTION EPIDURAL; INFILTRATION; INTRACAUDAL; PERINEURAL at 15:17

## 2018-10-19 RX ADMIN — HEPARIN SODIUM AND DEXTROSE 18 UNITS/KG/HR: 10000; 5 INJECTION INTRAVENOUS at 17:04

## 2018-10-19 RX ADMIN — OXYCODONE AND ACETAMINOPHEN 2 TABLET: 5; 325 TABLET ORAL at 16:14

## 2018-10-19 RX ADMIN — Medication 10 ML: at 08:52

## 2018-10-19 RX ADMIN — MULTIPLE VITAMINS W/ MINERALS TAB 1 TABLET: TAB at 08:51

## 2018-10-19 RX ADMIN — LACTULOSE 20 G: 20 SOLUTION ORAL at 16:13

## 2018-10-19 RX ADMIN — PROPOFOL 10 MG: 10 INJECTION, EMULSION INTRAVENOUS at 14:44

## 2018-10-19 RX ADMIN — Medication 10 ML: at 06:53

## 2018-10-19 RX ADMIN — ACETAMINOPHEN 650 MG: 325 TABLET, FILM COATED ORAL at 16:14

## 2018-10-19 RX ADMIN — PROPOFOL 10 MG: 10 INJECTION, EMULSION INTRAVENOUS at 14:48

## 2018-10-19 RX ADMIN — GABAPENTIN 300 MG: 300 CAPSULE ORAL at 20:55

## 2018-10-19 RX ADMIN — LACTULOSE 20 G: 20 SOLUTION ORAL at 20:55

## 2018-10-19 RX ADMIN — LINACLOTIDE 290 MCG: 145 CAPSULE, GELATIN COATED ORAL at 08:52

## 2018-10-19 RX ADMIN — FENTANYL CITRATE 50 MCG: 50 INJECTION, SOLUTION INTRAMUSCULAR; INTRAVENOUS at 01:34

## 2018-10-19 RX ADMIN — FENTANYL CITRATE 50 MCG: 50 INJECTION, SOLUTION INTRAMUSCULAR; INTRAVENOUS at 06:53

## 2018-10-19 RX ADMIN — GABAPENTIN 300 MG: 300 CAPSULE ORAL at 08:51

## 2018-10-19 RX ADMIN — SODIUM CHLORIDE: 9 INJECTION, SOLUTION INTRAVENOUS at 14:17

## 2018-10-19 RX ADMIN — ALPRAZOLAM 0.25 MG: 0.25 TABLET ORAL at 12:14

## 2018-10-19 RX ADMIN — PROPOFOL 20 MG: 10 INJECTION, EMULSION INTRAVENOUS at 14:32

## 2018-10-19 RX ADMIN — OLODATEROL RESPIMAT INHALATION SPRAY 2 PUFF: 2.5 SPRAY, METERED RESPIRATORY (INHALATION) at 08:52

## 2018-10-19 RX ADMIN — MORPHINE SULFATE 7.5 MG: 15 TABLET ORAL at 23:34

## 2018-10-19 RX ADMIN — TIOTROPIUM BROMIDE 18 MCG: 18 CAPSULE ORAL; RESPIRATORY (INHALATION) at 08:52

## 2018-10-19 RX ADMIN — OXYCODONE AND ACETAMINOPHEN 2 TABLET: 5; 325 TABLET ORAL at 02:54

## 2018-10-19 RX ADMIN — ATORVASTATIN CALCIUM 10 MG: 10 TABLET, FILM COATED ORAL at 08:51

## 2018-10-19 RX ADMIN — Medication 10 ML: at 01:34

## 2018-10-19 RX ADMIN — OXYCODONE AND ACETAMINOPHEN 2 TABLET: 5; 325 TABLET ORAL at 08:52

## 2018-10-19 RX ADMIN — FENTANYL CITRATE 50 MCG: 50 INJECTION, SOLUTION INTRAMUSCULAR; INTRAVENOUS at 12:14

## 2018-10-19 RX ADMIN — ALPRAZOLAM 0.25 MG: 0.25 TABLET ORAL at 22:30

## 2018-10-19 RX ADMIN — PANTOPRAZOLE SODIUM 40 MG: 40 TABLET, DELAYED RELEASE ORAL at 08:52

## 2018-10-19 RX ADMIN — PROPOFOL 10 MG: 10 INJECTION, EMULSION INTRAVENOUS at 14:40

## 2018-10-19 ASSESSMENT — PAIN DESCRIPTION - DESCRIPTORS
DESCRIPTORS: DISCOMFORT;DULL;CONSTANT
DESCRIPTORS: DISCOMFORT;CONSTANT;DULL
DESCRIPTORS: DISCOMFORT;DULL;CONSTANT
DESCRIPTORS: DISCOMFORT;DULL;CONSTANT

## 2018-10-19 ASSESSMENT — PAIN SCALES - GENERAL
PAINLEVEL_OUTOF10: 8
PAINLEVEL_OUTOF10: 9
PAINLEVEL_OUTOF10: 6
PAINLEVEL_OUTOF10: 8
PAINLEVEL_OUTOF10: 0
PAINLEVEL_OUTOF10: 9
PAINLEVEL_OUTOF10: 0
PAINLEVEL_OUTOF10: 10
PAINLEVEL_OUTOF10: 5
PAINLEVEL_OUTOF10: 7
PAINLEVEL_OUTOF10: 0
PAINLEVEL_OUTOF10: 5
PAINLEVEL_OUTOF10: 0

## 2018-10-19 ASSESSMENT — PAIN DESCRIPTION - LOCATION
LOCATION: CHEST;RIB CAGE

## 2018-10-19 ASSESSMENT — PAIN DESCRIPTION - PAIN TYPE
TYPE: ACUTE PAIN;CHRONIC PAIN

## 2018-10-19 ASSESSMENT — PAIN DESCRIPTION - FREQUENCY
FREQUENCY: CONTINUOUS
FREQUENCY: CONTINUOUS
FREQUENCY: INTERMITTENT

## 2018-10-19 ASSESSMENT — PAIN DESCRIPTION - ORIENTATION
ORIENTATION: LEFT;MID
ORIENTATION: LEFT;MID
ORIENTATION: MID;LEFT
ORIENTATION: LEFT;MID

## 2018-10-19 ASSESSMENT — PAIN DESCRIPTION - PROGRESSION
CLINICAL_PROGRESSION: NOT CHANGED

## 2018-10-19 ASSESSMENT — PAIN DESCRIPTION - ONSET
ONSET: GRADUAL

## 2018-10-19 ASSESSMENT — COPD QUESTIONNAIRES: CAT_SEVERITY: MODERATE

## 2018-10-19 NOTE — PROGRESS NOTES
hoarseness. Cardiovascular: Denies palpitations, +left sided chest/rib pain  Respiratory: as above  Gastrointestinal: Denies nausea, vomiting, poor appetite, diarrhea, heartburn or reflux  Genitourinary: Denies dysuria, frequency, urgency or hematuria  Musculoskeletal: Denies myalgias, +muscle weakness, +bone pain  Neurological: Denies dizziness, vertigo, headache, and focal weakness  Psychological: Denies anxiety and depression  Endocrine: Denies heat intolerance and cold intolerance  Hematopoietic/Lymphatic: Denies bleeding problems and blood transfusions      Objective   Vitals: BP (!) 157/88   Pulse 87   Temp 97.8 °F (36.6 °C) (Temporal)   Resp 20   Ht 5' 6\" (1.676 m)   Wt 172 lb (78 kg)   SpO2 100%   BMI 27.76 kg/m²          Physical Exam:  General Appearance: appears comfortable in no acute distress. HEENT: Normocephalic atraumatic without obvious abnormality   Neck: Lips, mucosa, and tongue normal.  Supple, symmetrical, trachea midline, no adenopathy;thyroid:  no enlargement/tenderness/nodules or JVD. Lung: Breath sounds diminished bilateral bases. Respirations   unlabored. Symmetrical expansion. Heart: RRR, normal S1, S2. No MRG  Abdomen: Soft, NT, ND. BS present x 4 quadrants. No bruit or organomegaly. Extremities: Pedal pulses 2+ symmetric b/l. Extremities normal, no cyanosis, clubbing, or edema. Musculokeletal: No joint swelling, no muscle tenderness. ROM normal in all joints of extremities. Neurologic: Mental status: Alert and Oriented X3 . Pertinent/ New Labs and Imaging Studies     Imaging Personally Reviewed:  CTA chest 10/17/18    No central pulmonary embolism. The peripheral vessels are suboptimally   evaluated with  some filling defects in the distal segmental and   peripheral branches in the right lower lobe concerning for small PE in   the distal vessels.       Nodular circumferential pleural thickening in the left hemithorax   concerning for malignancy.  Consideration should

## 2018-10-19 NOTE — CONSULTS
Palliative Care Department  Palliative Care Initial Consult  Provider: [address] [place] [address] Day: 3    Referring Provider:  Yary Mcrae CNP    Reason for Consult:  [x]  Code status Discussion  [x]  Assist with goals of care  []  Psychosocial support  [x]  Symptom Management  []  Advanced Care Planning    Chief Complaint: Danielle Brenner is a 65 y.o. male with chief complaint of chest pain and SOB.     Assessment/Plan      Active Hospital Problems    Diagnosis Date Noted    Severe protein-calorie malnutrition ([facility] [address] [E43] 10/18/2018    Acute pulmonary thromboembolism ([address] [I26.99] 10/17/2018    COPD (chronic obstructive pulmonary disease) ([facility] [J44.9] 04/11/2018    Hx of endovascular stent graft for abdominal aortic aneurysm [Z95.828] 12/02/2017    PVD (peripheral vascular disease) with claudication ([facility] [address] [I73.9] 03/22/2016        Chest pain and SOB:   -  Fentanyl 50 mcg IV Q 4 PRN - stop   -  Percocet 5-325 mg 1-2 tabs Q 6 PRN - stop   -  Lidoderm patch   -  Neurontin 300 mg PO TID -continue   -  Start MS IR 7.5 mg for moderate pain, 15 mg for severe pain every 4 hours PRN   -  He would likely benefit from long acting Opioid will assess for further need tomorrow    Fibrosing Pleuritis:   -  Cardiothoracic surgery following, hx of VATS in June 2018, no intervention offered at this time, offered second opinion   -  Pulmonology following   -  CT showing osteolytic lesion,  Bone scan done   -  For left rib bone biopsy with IR today    Palliative Care Encounter/Recommendations:      - Goals of care: continue current management and to be determined     - Code Status: full code     - Symptom Management:    Symptom Medications Number Doses in Past 24 hrs   Pain Tyleonl 650 mg PO Q 4 PRN  Fentanyl 50 mcg IV Q 4 PRN  Percocet 5-325 1 tab Q 6 PRN  Percocet 5-325 2 tab Q 6 PRN  Gabapentin 300 mg TID  Lidoderm patch 0  3  0  4  3  1 patch on   Nausea/vomiting Zofran 4 mg IV Q 6 PRN 0   Bowel

## 2018-10-19 NOTE — ANESTHESIA POSTPROCEDURE EVALUATION
Department of Anesthesiology  Postprocedure Note    Patient: Selina Byrd  MRN: [id number]  YOB: 1940  Date of evaluation: 10/19/2018  Time:  2:58 PM     Procedure Summary     Date:  10/19/18 [place]  [facility] [place]    Anesthesia Start:  4977 Anesthesia Stop:  1609    Procedure:  [place] [facility] CT BIOPSY W ANESTHESIA Diagnosis:      Scheduled Providers:   Responsible Provider:  Carlotta Barton MD    Anesthesia Type:  MAC ASA Status:  3          Anesthesia Type: MAC    Madhavi Phase I:      Madhavi Phase II:      Last vitals: Reviewed and per EMR flowsheets.        Anesthesia Post Evaluation    Patient location during evaluation: bedside  Patient participation: complete - patient participated  Level of consciousness: awake and alert  Airway patency: patent  Nausea & Vomiting: no nausea and no vomiting  Complications: no  Cardiovascular status: blood pressure returned to baseline  Respiratory status: acceptable  Hydration status: euvolemic

## 2018-10-19 NOTE — ANESTHESIA PRE PROCEDURE
for abdominal Aortic Aneurysm. Femoral-popliteal atherosclerosis           Neuro/Psych:                ROS comment: [place]   GI/Hepatic/Renal: Neg GI/Hepatic/Renal ROS            Endo/Other: Negative Endo/Other ROS                    Abdominal:           Vascular:                                        Anesthesia Plan      MAC     ASA 3             Anesthetic plan and risks discussed with patient. Plan discussed with attending. April Gonzalez, APRN - CRNA   10/19/2018      DOS STAFF ADDENDUM:    Pt seen and examined, physical exam updated, chart reviewed including anesthesia, drug and allergy history. H&P reviewed. No interval changes to history or physical examination (unless noted above). NPO status confirmed. Anesthetic plan, risks, benefits, alternatives discussed with patient. Patient verbalized an understanding and agrees to proceed.      Sushma Nash MD   Staff Anesthesiologist

## 2018-10-20 PROCEDURE — 6370000000 HC RX 637 (ALT 250 FOR IP): Performed by: INTERNAL MEDICINE

## 2018-10-20 PROCEDURE — 2060000000 HC ICU INTERMEDIATE R&B

## 2018-10-20 PROCEDURE — 85730 THROMBOPLASTIN TIME PARTIAL: CPT

## 2018-10-20 PROCEDURE — 6370000000 HC RX 637 (ALT 250 FOR IP): Performed by: NURSE PRACTITIONER

## 2018-10-20 PROCEDURE — 6360000002 HC RX W HCPCS: Performed by: INTERNAL MEDICINE

## 2018-10-20 PROCEDURE — 36415 COLL VENOUS BLD VENIPUNCTURE: CPT

## 2018-10-20 PROCEDURE — 2700000000 HC OXYGEN THERAPY PER DAY

## 2018-10-20 RX ORDER — BISACODYL 10 MG
10 SUPPOSITORY, RECTAL RECTAL DAILY PRN
Status: DISCONTINUED | OUTPATIENT
Start: 2018-10-20 | End: 2018-10-31 | Stop reason: HOSPADM

## 2018-10-20 RX ORDER — FERROUS SULFATE 325(65) MG
325 TABLET ORAL
Status: DISCONTINUED | OUTPATIENT
Start: 2018-10-20 | End: 2018-10-31 | Stop reason: HOSPADM

## 2018-10-20 RX ADMIN — MORPHINE SULFATE 15 MG: 15 TABLET ORAL at 04:53

## 2018-10-20 RX ADMIN — OLODATEROL RESPIMAT INHALATION SPRAY 2 PUFF: 2.5 SPRAY, METERED RESPIRATORY (INHALATION) at 09:47

## 2018-10-20 RX ADMIN — GABAPENTIN 300 MG: 300 CAPSULE ORAL at 09:43

## 2018-10-20 RX ADMIN — DOCUSATE SODIUM 100 MG: 100 CAPSULE, LIQUID FILLED ORAL at 09:43

## 2018-10-20 RX ADMIN — BISACODYL 10 MG: 10 SUPPOSITORY RECTAL at 14:48

## 2018-10-20 RX ADMIN — LINACLOTIDE 290 MCG: 145 CAPSULE, GELATIN COATED ORAL at 09:43

## 2018-10-20 RX ADMIN — LACTULOSE 20 G: 20 SOLUTION ORAL at 20:07

## 2018-10-20 RX ADMIN — MAGNESIUM HYDROXIDE 30 ML: 400 SUSPENSION ORAL at 09:43

## 2018-10-20 RX ADMIN — ALPRAZOLAM 0.25 MG: 0.25 TABLET ORAL at 09:44

## 2018-10-20 RX ADMIN — GABAPENTIN 300 MG: 300 CAPSULE ORAL at 20:07

## 2018-10-20 RX ADMIN — MORPHINE SULFATE 15 MG: 15 TABLET ORAL at 11:14

## 2018-10-20 RX ADMIN — TIOTROPIUM BROMIDE 18 MCG: 18 CAPSULE ORAL; RESPIRATORY (INHALATION) at 09:47

## 2018-10-20 RX ADMIN — GABAPENTIN 300 MG: 300 CAPSULE ORAL at 13:16

## 2018-10-20 RX ADMIN — ALPRAZOLAM 0.25 MG: 0.25 TABLET ORAL at 20:07

## 2018-10-20 RX ADMIN — HEPARIN SODIUM 18 UNITS/KG/HR: 10000 INJECTION, SOLUTION INTRAVENOUS at 09:40

## 2018-10-20 RX ADMIN — PANTOPRAZOLE SODIUM 40 MG: 40 TABLET, DELAYED RELEASE ORAL at 09:43

## 2018-10-20 RX ADMIN — ATORVASTATIN CALCIUM 10 MG: 10 TABLET, FILM COATED ORAL at 09:43

## 2018-10-20 RX ADMIN — FERROUS SULFATE TAB 325 MG (65 MG ELEMENTAL FE) 325 MG: 325 (65 FE) TAB at 13:16

## 2018-10-20 RX ADMIN — MORPHINE SULFATE 15 MG: 15 TABLET ORAL at 14:50

## 2018-10-20 RX ADMIN — ASPIRIN 81 MG: 81 TABLET ORAL at 09:43

## 2018-10-20 RX ADMIN — LACTULOSE 20 G: 20 SOLUTION ORAL at 09:43

## 2018-10-20 RX ADMIN — MULTIPLE VITAMINS W/ MINERALS TAB 1 TABLET: TAB at 09:43

## 2018-10-20 RX ADMIN — MAGNESIUM HYDROXIDE 30 ML: 400 SUSPENSION ORAL at 09:45

## 2018-10-20 ASSESSMENT — PAIN DESCRIPTION - FREQUENCY: FREQUENCY: INTERMITTENT

## 2018-10-20 ASSESSMENT — PAIN SCALES - GENERAL
PAINLEVEL_OUTOF10: 6
PAINLEVEL_OUTOF10: 4
PAINLEVEL_OUTOF10: 5
PAINLEVEL_OUTOF10: 10
PAINLEVEL_OUTOF10: 8
PAINLEVEL_OUTOF10: 0
PAINLEVEL_OUTOF10: 8

## 2018-10-20 ASSESSMENT — PAIN DESCRIPTION - LOCATION: LOCATION: CHEST;RIB CAGE

## 2018-10-20 ASSESSMENT — PAIN DESCRIPTION - PAIN TYPE: TYPE: ACUTE PAIN;CHRONIC PAIN

## 2018-10-20 ASSESSMENT — PAIN DESCRIPTION - ONSET: ONSET: GRADUAL

## 2018-10-20 ASSESSMENT — PAIN DESCRIPTION - DESCRIPTORS: DESCRIPTORS: DISCOMFORT;DULL;CONSTANT

## 2018-10-20 ASSESSMENT — PAIN DESCRIPTION - ORIENTATION: ORIENTATION: MID;LEFT

## 2018-10-20 ASSESSMENT — PAIN DESCRIPTION - PROGRESSION: CLINICAL_PROGRESSION: NOT CHANGED

## 2018-10-20 NOTE — PROGRESS NOTES
pleural effusion or pleural-based mass are considered. The right lung is clear. Progressive loss of volume of the left hemithorax with increasing infiltrates/atelectasis and pleural effusion and progressive irregular pleural thickening in the left hemithorax. This may be due to infectious inflammatory process like pneumonia and empyema. Pleural-based malignancy is not excluded. Cta Pulmonary W Contrast    Result Date: 10/17/2018  Patient MRN:  [id number] : 1940 Age: 65 years Gender: Male Order Date:  10/17/2018 8:15 AM EXAM: CTA PULMONARY W CONTRAST number of images 447 including MIP coronal and sagittal reconstruction images Technique: Low-dose CT  acquisition technique included one of following options; 1 . Automated exposure control, 2. Adjustment of MA and or KV according to patient's size or 3. Use of iterative reconstruction. INDICATION:  [facility] 3 months ago to remove non-cancerous lung mass on Left  COMPARISON: Previous CT scan of the chest 2018 FINDINGS: There is borderline cardiac size with a small pericardial effusion. There is coronary artery calcification. There is aneurysm ascending thoracic aorta measuring 4.3 x 4.2 cm. Small lymph nodes are identified in the mediastinum. The trachea and major bronchi are patent. There are no filling defects in the main pulmonary artery and the central branches to suggest a central pulmonary embolism. However, the distal segmental and peripheral branches are suboptimally evaluated due to poor contrast. Given this limitation, some filling defects are identified in the distal segmental and peripheral branches in the right lower lobe which may represent small PE versus flow-related artifact. Note is made of the previously noted circumferential irregular nodular pleural thickening in the left lung with a nodular densities especially in the left lung base measuring up to 2.5 cm. Tiny pleural effusions are also present.  Calcified with breakfast    ALPRAZolam  0.25 mg Oral BID    lactulose  20 g Oral BID    lidocaine  1 patch Transdermal Daily    sodium chloride flush  10 mL Intravenous 2 times per day    aspirin EC  81 mg Oral Daily    atorvastatin  10 mg Oral Daily    gabapentin  300 mg Oral TID    linaclotide  290 mcg Oral Daily    therapeutic multivitamin-minerals  1 tablet Oral Daily    pantoprazole  40 mg Oral Daily    sodium chloride flush  10 mL Intravenous 2 times per day    tiotropium  18 mcg Inhalation Daily    olodaterol  2 puff Inhalation Daily     Continuous Infusions:   heparin (porcine) 18 Units/kg/hr (10/20/18 0940)     PRN Meds:.morphine **OR** morphine, sodium chloride flush, sodium chloride flush, acetaminophen, docusate sodium, sodium chloride flush, magnesium hydroxide, ondansetron, perflutren lipid microspheres, heparin (porcine), heparin (porcine)    I/O last 3 completed shifts:   In: 360 [P.O.:160; I.V.:200]  Out: 1578 [Urine:1575; Blood:3]  I/O this shift:  In: 376 [I.V.:376]  Out: -     Intake/Output Summary (Last 24 hours) at 10/20/18 1155  Last data filed at 10/20/18 0755   Gross per 24 hour   Intake              736 ml   Output             1578 ml   Net             -842 ml       Assessment:    Active Hospital Problems    Diagnosis    Severe protein[facility]calorie malnutrition ([facility] [E43]    Acute pulmonary thromboembolism ([facility] [I26.99]    COPD (chronic obstructive pulmonary disease) ([facility] [J44.9]    Hx of endovascular stent graft for abdominal aortic aneurysm [S04.168]    PVD (peripheral vascular disease) with claudication ([facility] [I73.9]       Plan:  CTS consult reviewed             Continue anticoagulation            US LE negative for DVT             Bone scan c/w metastatic disease--doubt MM             Respiratory panel neg             Blood and sputum cultures still pending             Repeat WBC nl             Recheck CEA nl             Lidoderm patch for left chest pain             IR for bx

## 2018-10-20 NOTE — PROGRESS NOTES
present x 4 quadrants. No bruit or organomegaly. Extremities: Pedal pulses 2+ symmetric b/l. Extremities normal, no cyanosis, clubbing, or edema. Musculokeletal: No joint swelling, no muscle tenderness. ROM normal in all joints of extremities. Neurologic: Mental status: Alert and Oriented X3 . Labs:  Lab Results   Component Value Date    WBC 9.1 10/18/2018    HGB 8.7 10/18/2018    HCT 28.3 10/18/2018    MCV 79.5 10/18/2018    MCH 24.4 10/18/2018    MCHC 30.7 10/18/2018    RDW 18.1 10/18/2018     10/19/2018    MPV 9.0 10/18/2018     Lab Results   Component Value Date     10/17/2018    K 3.2 10/17/2018    K 4.7 06/07/2018    CL 97 10/17/2018    CO2 34 10/17/2018    BUN 12 10/17/2018    CREATININE 0.6 10/17/2018    LABALBU 2.5 10/17/2018    CALCIUM 11.2 10/17/2018    GFRAA >60 10/17/2018    LABGLOM >60 10/17/2018     Lab Results   Component Value Date    PROTIME 16.7 10/18/2018    INR 1.5 10/18/2018     No results for input(s): PROBNP in the last 72 hours. Recent Labs      10/17/18   1714   PROCAL  0.15*     This SmartLink has not been configured with any valid records. Results for [employer] (MRN [id number]) as of 10/18/2018 10:49   Ref. Range 10/18/2018 09:23   CEA Latest Ref Range: 0.0 - 5.2 ng/mL 3.4     Micro:  No results for input(s): CULTRESP in the last 72 hours. No results for input(s): LABGRAM in the last 72 hours. No results for input(s): LEGUR in the last 72 hours. No results for input(s): STREPNEUMAGU in the last 72 hours. No results for input(s): LP1UAG in the last 72 hours. Assessment:    1. Acute PE provoked by likely malignancy  2. Hypoxic respiratory failure related to above   3. Centrilobular emphysema   4. COPD GOLD Stage 1- not in acute exacerbation  5. 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 Unintentional 17 lb weight loss  7. Cigarette nicotine dependence - quit 4 months ago  8.  Hx left VATS with  pleural biopsy 6/12/18,drainage of empyema and  insertion of pleur x, drain removed on 7/2/18  9. Hx previous asbestos exposure       Plan:   HEPARIN GTT   Bronchodilators   Wean o2 as tolerated   Follow up bone biopsy     Aby Nash M.D.    Pulmonary/Critical Care Medicine

## 2018-10-21 ENCOUNTER — APPOINTMENT (OUTPATIENT)
Dept: GENERAL RADIOLOGY | Age: 78
DRG: 166 | End: 2018-10-21
Payer: MEDICARE

## 2018-10-21 PROBLEM — C79.9 METASTATIC CANCER (HCC): Status: ACTIVE | Noted: 2018-10-21

## 2018-10-21 LAB
APTT: 51.1 SEC (ref 24.5–35.1)
BASOPHILS ABSOLUTE: 0.06 E9/L (ref 0–0.2)
BASOPHILS RELATIVE PERCENT: 0.5 % (ref 0–2)
EOSINOPHILS ABSOLUTE: 0.84 E9/L (ref 0.05–0.5)
EOSINOPHILS RELATIVE PERCENT: 7.2 % (ref 0–6)
HCT VFR BLD CALC: 32.5 % (ref 37–54)
HEMOGLOBIN: 9.4 G/DL (ref 12.5–16.5)
IMMATURE GRANULOCYTES #: 0.1 E9/L
IMMATURE GRANULOCYTES %: 0.9 % (ref 0–5)
LYMPHOCYTES ABSOLUTE: 1.86 E9/L (ref 1.5–4)
LYMPHOCYTES RELATIVE PERCENT: 16 % (ref 20–42)
MCH RBC QN AUTO: 23.8 PG (ref 26–35)
MCHC RBC AUTO-ENTMCNC: 28.9 % (ref 32–34.5)
MCV RBC AUTO: 82.3 FL (ref 80–99.9)
MONOCYTES ABSOLUTE: 0.69 E9/L (ref 0.1–0.95)
MONOCYTES RELATIVE PERCENT: 5.9 % (ref 2–12)
NEUTROPHILS ABSOLUTE: 8.06 E9/L (ref 1.8–7.3)
NEUTROPHILS RELATIVE PERCENT: 69.5 % (ref 43–80)
PDW BLD-RTO: 18.3 FL (ref 11.5–15)
PLATELET # BLD: 430 E9/L (ref 130–450)
PMV BLD AUTO: 9.6 FL (ref 7–12)
RBC # BLD: 3.95 E12/L (ref 3.8–5.8)
WBC # BLD: 11.6 E9/L (ref 4.5–11.5)

## 2018-10-21 PROCEDURE — 2700000000 HC OXYGEN THERAPY PER DAY

## 2018-10-21 PROCEDURE — 85025 COMPLETE CBC W/AUTO DIFF WBC: CPT

## 2018-10-21 PROCEDURE — 99232 SBSQ HOSP IP/OBS MODERATE 35: CPT | Performed by: NURSE PRACTITIONER

## 2018-10-21 PROCEDURE — 71045 X-RAY EXAM CHEST 1 VIEW: CPT

## 2018-10-21 PROCEDURE — 6370000000 HC RX 637 (ALT 250 FOR IP): Performed by: INTERNAL MEDICINE

## 2018-10-21 PROCEDURE — 36415 COLL VENOUS BLD VENIPUNCTURE: CPT

## 2018-10-21 PROCEDURE — 6360000002 HC RX W HCPCS: Performed by: INTERNAL MEDICINE

## 2018-10-21 PROCEDURE — 2060000000 HC ICU INTERMEDIATE R&B

## 2018-10-21 PROCEDURE — 2580000003 HC RX 258: Performed by: INTERNAL MEDICINE

## 2018-10-21 PROCEDURE — 85730 THROMBOPLASTIN TIME PARTIAL: CPT

## 2018-10-21 PROCEDURE — 6370000000 HC RX 637 (ALT 250 FOR IP): Performed by: NURSE PRACTITIONER

## 2018-10-21 RX ORDER — SENNA AND DOCUSATE SODIUM 50; 8.6 MG/1; MG/1
2 TABLET, FILM COATED ORAL DAILY
Status: DISCONTINUED | OUTPATIENT
Start: 2018-10-21 | End: 2018-10-31 | Stop reason: HOSPADM

## 2018-10-21 RX ORDER — ALPRAZOLAM 0.25 MG/1
0.25 TABLET ORAL 2 TIMES DAILY PRN
Status: DISCONTINUED | OUTPATIENT
Start: 2018-10-21 | End: 2018-10-31 | Stop reason: HOSPADM

## 2018-10-21 RX ORDER — PETROLATUM 42 G/100G
OINTMENT TOPICAL 2 TIMES DAILY PRN
Status: DISCONTINUED | OUTPATIENT
Start: 2018-10-21 | End: 2018-10-31 | Stop reason: HOSPADM

## 2018-10-21 RX ADMIN — LACTULOSE 20 G: 20 SOLUTION ORAL at 08:33

## 2018-10-21 RX ADMIN — ATORVASTATIN CALCIUM 10 MG: 10 TABLET, FILM COATED ORAL at 08:33

## 2018-10-21 RX ADMIN — DOCUSATE SODIUM 100 MG: 100 CAPSULE, LIQUID FILLED ORAL at 08:33

## 2018-10-21 RX ADMIN — PANTOPRAZOLE SODIUM 40 MG: 40 TABLET, DELAYED RELEASE ORAL at 08:32

## 2018-10-21 RX ADMIN — TIOTROPIUM BROMIDE 18 MCG: 18 CAPSULE ORAL; RESPIRATORY (INHALATION) at 08:36

## 2018-10-21 RX ADMIN — GABAPENTIN 300 MG: 300 CAPSULE ORAL at 08:33

## 2018-10-21 RX ADMIN — LINACLOTIDE 290 MCG: 145 CAPSULE, GELATIN COATED ORAL at 08:33

## 2018-10-21 RX ADMIN — OLODATEROL RESPIMAT INHALATION SPRAY 2 PUFF: 2.5 SPRAY, METERED RESPIRATORY (INHALATION) at 08:36

## 2018-10-21 RX ADMIN — FERROUS SULFATE TAB 325 MG (65 MG ELEMENTAL FE) 325 MG: 325 (65 FE) TAB at 08:33

## 2018-10-21 RX ADMIN — ALPRAZOLAM 0.25 MG: 0.25 TABLET ORAL at 08:33

## 2018-10-21 RX ADMIN — ASPIRIN 81 MG: 81 TABLET ORAL at 08:33

## 2018-10-21 RX ADMIN — HEPARIN SODIUM 18 UNITS/KG/HR: 10000 INJECTION, SOLUTION INTRAVENOUS at 23:10

## 2018-10-21 RX ADMIN — MORPHINE SULFATE 15 MG: 15 TABLET ORAL at 16:11

## 2018-10-21 RX ADMIN — MULTIPLE VITAMINS W/ MINERALS TAB 1 TABLET: TAB at 08:33

## 2018-10-21 RX ADMIN — BISACODYL 10 MG: 10 SUPPOSITORY RECTAL at 08:33

## 2018-10-21 RX ADMIN — Medication 10 ML: at 08:32

## 2018-10-21 RX ADMIN — LACTULOSE 20 G: 20 SOLUTION ORAL at 20:27

## 2018-10-21 RX ADMIN — HEPARIN SODIUM 18 UNITS/KG/HR: 10000 INJECTION, SOLUTION INTRAVENOUS at 03:50

## 2018-10-21 RX ADMIN — ALPRAZOLAM 0.25 MG: 0.25 TABLET ORAL at 23:10

## 2018-10-21 RX ADMIN — GABAPENTIN 300 MG: 300 CAPSULE ORAL at 20:27

## 2018-10-21 ASSESSMENT — PAIN SCALES - GENERAL
PAINLEVEL_OUTOF10: 4
PAINLEVEL_OUTOF10: 0
PAINLEVEL_OUTOF10: 7

## 2018-10-21 NOTE — PROGRESS NOTES
Chief Complaint:  Pulmonary embolism/DVT    Subjective: The patient is alert. Still left chest pain. No  new problems overnight. Denies chest pain & SOB . Denies abdominal pain. Tolerating diet. No nausea or vomiting. Spouse at the bedside    Objective:    Vitals:    10/21/18 0830   BP: 117/69   Pulse: 105   Resp:    Temp: 98.3 °F (36.8 °C)   SpO2: 99%     A&O x's 3, ill appearing  Heart:  RRR, no murmurs, gallops, or rubs. Lungs:  CTA bilaterally, no wheeze, rales or rhonchi  Abd: bowel sounds present, nontender, nondistended, no masses  Extrem:  No clubbing, cyanosis, or edema  Tele: NSR    Lab Results   Component Value Date     10/17/2018    K 3.2 10/17/2018    K 4.7 2018    CL 97 10/17/2018    CO2 34 10/17/2018    BUN 12 10/17/2018    CREATININE 0.6 10/17/2018    CALCIUM 11.2 10/17/2018      Lab Results   Component Value Date    WBC 11.6 10/21/2018    RBC 3.95 10/21/2018    HGB 9.4 10/21/2018    HCT 32.5 10/21/2018    MCV 82.3 10/21/2018    MCH 23.8 10/21/2018    MCHC 28.9 10/21/2018    RDW 18.3 10/21/2018     10/21/2018    MPV 9.6 10/21/2018     PT/INR:    Lab Results   Component Value Date    PROTIME 16.7 10/18/2018    INR 1.5 10/18/2018     No results for input(s): POCGLU in the last 72 hours. No results for input(s): NA, K, CL, CO2, BUN, LABALBU, CREATININE, CALCIUM, GFRAA, LABGLOM, GLUCOSE in the last 72 hours. Invalid input(s): GLU    Ct Abdomen Pelvis W Iv Contrast Additional Contrast? None    Result Date: 10/17/2018  Patient MRN:  [id number] : 1940 Age: 65 years Gender: Male Order Date:  10/17/2018 8:30 AM EXAM: CT ABDOMEN PELVIS W IV CONTRAST number of images 370 Technique: Low-dose CT  acquisition technique included one of following options; 1 . Automated exposure control, 2. Adjustment of MA and or KV according to patient's size or 3. Use of iterative reconstruction.  INDICATION:  AAA repair in past tachy;  COMPARISON: Previous examination 2014 FINDINGS: Xanax for anxiety                       Jani Ramon DO  9:41 AM  10/21/2018

## 2018-10-21 NOTE — PROGRESS NOTES
pateint began to desat sitting in the chair. Began to transfer pt back to bed and started to have bm. Sat patient on toilet and had an extremely large liquid bm.  spo2 was 80%. On NrB At this time. spo2 is 97% at this time. Lungs are extremely diminished on the left side, crackles on the right. Stat cxr ordered.      spo2 is 99%, still on NRB at this time. (7084)

## 2018-10-21 NOTE — PROGRESS NOTES
close f/u as an outpatient with PM for ongoing symptom management. We will return later today to further assess with Dr. Daryl Florez after his biopsy. 10/21/18  Lester Diez is seen at the bedside today, no family present. He is sleeping soundly, wakes briefly to verbal, but quickly falls back to sleep. He has been more drowsy and the nursing staff reports some confusion over night. Pain is appears to currently be controlled, and when asked he denied pain for more. Will make Xanax PRN, as this is likely contributing to his drowsiness and confusion. He has not had a BM thus far and is for soap suds enema today. Will add Senokot-S 2 tabs daily and reevaluate tomorrow.     Pain Assessment   Ratin  Description: denied pain today  Duration: months  Frequency: daily  Location: left chest  Alleviating Factors: relaxation and pain medication  Exacerbating Factors: deep breaths  Effect: interference with activities    - Family Meeting:   Participants:No family meeting held today   Family meeting was held to discuss:No meeting today      Past Medical History:   Diagnosis Date    Bilateral carotid bruits 2017    Enlarged prostate     Femoral-popliteal atherosclerosis ([facility] 2017    [place]     Hx of endovascular stent graft for abdominal aortic aneurysm 2017    Hyperlipidemia     Pneumonia 2018    flu hospitalized 7 rose marie    Tobacco dependence 2017       Past Surgical History:   Procedure Laterality Date    ABDOMINAL AORTIC ANEURYSM REPAIR  2008    Berny Pryor    APPENDECTOMY      COLONOSCOPY      EYE SURGERY      imelda lense implants    OTHER SURGICAL HISTORY  3-    RLE lysis catheter    OTHER SURGICAL HISTORY  3-    Dr. Chad Mulligan- Angioplasty with stent to right common illiac artery    ND THORSC DX LUNGS/PERICAR/MED/PLEURAL SPACE W/O BX Left 2018    BRONCH, LEFT VIDEO ASSISTED THORACOSCOPY WITH DECORTICATION performed by Kathy Rajput MD at

## 2018-10-22 ENCOUNTER — APPOINTMENT (OUTPATIENT)
Dept: CT IMAGING | Age: 78
DRG: 166 | End: 2018-10-22
Payer: MEDICARE

## 2018-10-22 PROBLEM — E83.52 HYPERCALCEMIA OF MALIGNANCY: Status: ACTIVE | Noted: 2018-10-22

## 2018-10-22 LAB
ALBUMIN SERPL-MCNC: 1.7 G/DL (ref 3.5–4.7)
ALPHA-1-GLOBULIN: 0.5 G/DL (ref 0.2–0.4)
ALPHA-2-GLOBULIN: 1.1 G/FL (ref 0.5–1)
ANION GAP SERPL CALCULATED.3IONS-SCNC: 6 MMOL/L (ref 7–16)
APTT: 48.4 SEC (ref 24.5–35.1)
APTT: 53.7 SEC (ref 24.5–35.1)
B.E.: 18.4 MMOL/L (ref -3–3)
BETA GLOBULIN: 1.1 G/DL (ref 0.8–1.3)
BLOOD CULTURE, ROUTINE: NORMAL
BUN BLDV-MCNC: 15 MG/DL (ref 8–23)
CALCIUM SERPL-MCNC: 11.9 MG/DL (ref 8.6–10.2)
CHLORIDE BLD-SCNC: 97 MMOL/L (ref 98–107)
CO2: 42 MMOL/L (ref 22–29)
COHB: 0.8 % (ref 0–1.5)
CREAT SERPL-MCNC: 0.6 MG/DL (ref 0.7–1.2)
CRITICAL: ABNORMAL
CULTURE, BLOOD 2: NORMAL
DATE ANALYZED: ABNORMAL
DATE OF COLLECTION: ABNORMAL
ELECTROPHORESIS: ABNORMAL
GAMMA GLOBULIN: 1.3 G/DL (ref 0.7–1.6)
GFR AFRICAN AMERICAN: >60
GFR NON-AFRICAN AMERICAN: >60 ML/MIN/1.73
GLUCOSE BLD-MCNC: 123 MG/DL (ref 74–109)
HCO3: 43.9 MMOL/L (ref 22–26)
HCT VFR BLD CALC: 28.7 % (ref 37–54)
HEMOGLOBIN: 8.5 G/DL (ref 12.5–16.5)
HHB: 1.2 % (ref 0–5)
IMMUNOFIXATION RESULT, SERUM: NORMAL
LAB: 9558
Lab: ABNORMAL
MCH RBC QN AUTO: 23.9 PG (ref 26–35)
MCHC RBC AUTO-ENTMCNC: 29.6 % (ref 32–34.5)
MCV RBC AUTO: 80.8 FL (ref 80–99.9)
METER GLUCOSE: 100 MG/DL (ref 70–110)
METHB: 0.1 % (ref 0–1.5)
MODE: ABNORMAL
O2 CONTENT: 13.9 ML/DL
O2 SATURATION: 98.8 % (ref 92–98.5)
O2HB: 97.9 % (ref 94–97)
OPERATOR ID: 1064
PATIENT TEMP: 37 C
PCO2: 57.2 MMHG (ref 35–45)
PDW BLD-RTO: 18.1 FL (ref 11.5–15)
PH BLOOD GAS: 7.5 (ref 7.35–7.45)
PLATELET # BLD: 398 E9/L (ref 130–450)
PMV BLD AUTO: 9.5 FL (ref 7–12)
PO2: 126 MMHG (ref 60–100)
POTASSIUM SERPL-SCNC: 2.7 MMOL/L (ref 3.5–5)
RBC # BLD: 3.55 E12/L (ref 3.8–5.8)
SODIUM BLD-SCNC: 145 MMOL/L (ref 132–146)
SOURCE, BLOOD GAS: ABNORMAL
THB: 9.9 G/DL (ref 11.5–16.5)
TIME ANALYZED: 1229
TOTAL PROTEIN: 5.7 G/DL (ref 6.4–8.3)
WBC # BLD: 10.1 E9/L (ref 4.5–11.5)

## 2018-10-22 PROCEDURE — 85730 THROMBOPLASTIN TIME PARTIAL: CPT

## 2018-10-22 PROCEDURE — 85027 COMPLETE CBC AUTOMATED: CPT

## 2018-10-22 PROCEDURE — 80048 BASIC METABOLIC PNL TOTAL CA: CPT

## 2018-10-22 PROCEDURE — 36415 COLL VENOUS BLD VENIPUNCTURE: CPT

## 2018-10-22 PROCEDURE — 6360000002 HC RX W HCPCS: Performed by: INTERNAL MEDICINE

## 2018-10-22 PROCEDURE — 36600 WITHDRAWAL OF ARTERIAL BLOOD: CPT

## 2018-10-22 PROCEDURE — 2060000000 HC ICU INTERMEDIATE R&B

## 2018-10-22 PROCEDURE — 82805 BLOOD GASES W/O2 SATURATION: CPT

## 2018-10-22 PROCEDURE — 2700000000 HC OXYGEN THERAPY PER DAY

## 2018-10-22 PROCEDURE — 70450 CT HEAD/BRAIN W/O DYE: CPT

## 2018-10-22 PROCEDURE — 6370000000 HC RX 637 (ALT 250 FOR IP): Performed by: INTERNAL MEDICINE

## 2018-10-22 PROCEDURE — 82962 GLUCOSE BLOOD TEST: CPT

## 2018-10-22 PROCEDURE — 94640 AIRWAY INHALATION TREATMENT: CPT

## 2018-10-22 PROCEDURE — 99233 SBSQ HOSP IP/OBS HIGH 50: CPT | Performed by: NURSE PRACTITIONER

## 2018-10-22 PROCEDURE — 6370000000 HC RX 637 (ALT 250 FOR IP): Performed by: NURSE PRACTITIONER

## 2018-10-22 RX ORDER — POTASSIUM CHLORIDE 7.45 MG/ML
10 INJECTION INTRAVENOUS
Status: COMPLETED | OUTPATIENT
Start: 2018-10-22 | End: 2018-10-22

## 2018-10-22 RX ORDER — POTASSIUM BICARBONATE 25 MEQ/1
50 TABLET, EFFERVESCENT ORAL ONCE
Status: COMPLETED | OUTPATIENT
Start: 2018-10-22 | End: 2018-10-22

## 2018-10-22 RX ORDER — POTASSIUM BICARBONATE 25 MEQ/1
50 TABLET, EFFERVESCENT ORAL ONCE
Status: DISCONTINUED | OUTPATIENT
Start: 2018-10-22 | End: 2018-10-22

## 2018-10-22 RX ORDER — BUDESONIDE 0.5 MG/2ML
500 INHALANT ORAL 2 TIMES DAILY
Status: DISCONTINUED | OUTPATIENT
Start: 2018-10-22 | End: 2018-10-31 | Stop reason: HOSPADM

## 2018-10-22 RX ORDER — POTASSIUM CHLORIDE AND SODIUM CHLORIDE 900; 300 MG/100ML; MG/100ML
INJECTION, SOLUTION INTRAVENOUS CONTINUOUS
Status: DISCONTINUED | OUTPATIENT
Start: 2018-10-22 | End: 2018-10-23

## 2018-10-22 RX ORDER — OXYCODONE HYDROCHLORIDE AND ACETAMINOPHEN 5; 325 MG/1; MG/1
1 TABLET ORAL EVERY 4 HOURS PRN
Status: DISCONTINUED | OUTPATIENT
Start: 2018-10-22 | End: 2018-10-31 | Stop reason: HOSPADM

## 2018-10-22 RX ORDER — IPRATROPIUM BROMIDE AND ALBUTEROL SULFATE 2.5; .5 MG/3ML; MG/3ML
1 SOLUTION RESPIRATORY (INHALATION)
Status: DISCONTINUED | OUTPATIENT
Start: 2018-10-22 | End: 2018-10-31 | Stop reason: HOSPADM

## 2018-10-22 RX ORDER — HALOPERIDOL 5 MG/ML
5 INJECTION INTRAMUSCULAR ONCE
Status: COMPLETED | OUTPATIENT
Start: 2018-10-22 | End: 2018-10-22

## 2018-10-22 RX ORDER — FORMOTEROL FUMARATE 20 UG/2ML
20 SOLUTION RESPIRATORY (INHALATION) 2 TIMES DAILY
Status: DISCONTINUED | OUTPATIENT
Start: 2018-10-22 | End: 2018-10-31 | Stop reason: HOSPADM

## 2018-10-22 RX ADMIN — OXYCODONE AND ACETAMINOPHEN 1 TABLET: 5; 325 TABLET ORAL at 09:36

## 2018-10-22 RX ADMIN — SODIUM CHLORIDE AND POTASSIUM CHLORIDE: 9; 2.98 INJECTION, SOLUTION INTRAVENOUS at 13:27

## 2018-10-22 RX ADMIN — HEPARIN SODIUM 20 UNITS/KG/HR: 10000 INJECTION, SOLUTION INTRAVENOUS at 01:36

## 2018-10-22 RX ADMIN — MULTIPLE VITAMINS W/ MINERALS TAB 1 TABLET: TAB at 09:36

## 2018-10-22 RX ADMIN — PANTOPRAZOLE SODIUM 40 MG: 40 TABLET, DELAYED RELEASE ORAL at 09:36

## 2018-10-22 RX ADMIN — POTASSIUM CHLORIDE 10 MEQ: 7.46 INJECTION, SOLUTION INTRAVENOUS at 19:39

## 2018-10-22 RX ADMIN — GABAPENTIN 300 MG: 300 CAPSULE ORAL at 21:45

## 2018-10-22 RX ADMIN — IPRATROPIUM BROMIDE AND ALBUTEROL SULFATE 1 AMPULE: .5; 3 SOLUTION RESPIRATORY (INHALATION) at 16:03

## 2018-10-22 RX ADMIN — SENNOSIDES AND DOCUSATE SODIUM 2 TABLET: 8.6; 5 TABLET ORAL at 09:36

## 2018-10-22 RX ADMIN — POTASSIUM CHLORIDE 10 MEQ: 7.46 INJECTION, SOLUTION INTRAVENOUS at 18:29

## 2018-10-22 RX ADMIN — LACTULOSE 20 G: 20 SOLUTION ORAL at 09:36

## 2018-10-22 RX ADMIN — HEPARIN SODIUM 3390 UNITS: 1000 INJECTION INTRAVENOUS; SUBCUTANEOUS at 01:35

## 2018-10-22 RX ADMIN — POTASSIUM CHLORIDE 10 MEQ: 7.46 INJECTION, SOLUTION INTRAVENOUS at 21:37

## 2018-10-22 RX ADMIN — POTASSIUM CHLORIDE 10 MEQ: 7.46 INJECTION, SOLUTION INTRAVENOUS at 13:17

## 2018-10-22 RX ADMIN — ATORVASTATIN CALCIUM 10 MG: 10 TABLET, FILM COATED ORAL at 09:36

## 2018-10-22 RX ADMIN — LACTULOSE 20 G: 20 SOLUTION ORAL at 21:40

## 2018-10-22 RX ADMIN — FERROUS SULFATE TAB 325 MG (65 MG ELEMENTAL FE) 325 MG: 325 (65 FE) TAB at 09:36

## 2018-10-22 RX ADMIN — GABAPENTIN 300 MG: 300 CAPSULE ORAL at 09:36

## 2018-10-22 RX ADMIN — POTASSIUM BICARBONATE 50 MEQ: 25 TABLET, EFFERVESCENT ORAL at 12:01

## 2018-10-22 RX ADMIN — POTASSIUM CHLORIDE 10 MEQ: 7.46 INJECTION, SOLUTION INTRAVENOUS at 14:23

## 2018-10-22 RX ADMIN — ASPIRIN 81 MG: 81 TABLET ORAL at 09:36

## 2018-10-22 RX ADMIN — HALOPERIDOL LACTATE 5 MG: 5 INJECTION, SOLUTION INTRAMUSCULAR at 13:33

## 2018-10-22 RX ADMIN — LINACLOTIDE 290 MCG: 145 CAPSULE, GELATIN COATED ORAL at 09:36

## 2018-10-22 RX ADMIN — DOCUSATE SODIUM 100 MG: 100 CAPSULE, LIQUID FILLED ORAL at 09:36

## 2018-10-22 ASSESSMENT — PAIN SCALES - GENERAL
PAINLEVEL_OUTOF10: 8
PAINLEVEL_OUTOF10: 0

## 2018-10-22 NOTE — PROGRESS NOTES
Encounter/Recommendations:      - Goals of care: continue current management and to be determined     - Code Status: full code     - Symptom Management:    Symptom Medications Number Doses in Past 24 hrs   Pain Tyleonl 650 mg PO Q 4 PRN  Percocet 5-325 mg Q 4 PRN  MS IR 7.5 mg Q 4 PRN d/c'd  MS IR 15 mg Q 4 PRN d/c'd  Gabapentin 300 mg TID  Lidoderm patch 0  1  0  1  3  1 patch on   Nausea/vomiting Zofran 4 mg IV Q 6 PRN 0   Bowel Regime/constipation Lactulose 20 g BID stop  Linzess 290 mcg Daily  Colace 100 mg BID PRN  MOM daily PRN  Dulcolax Supp 10 mg Daily PRN  Senna-S 2 tabs daily 2  1  1  0  1  1   Anxiety/agitation/depression Xanax 0.25 PO BID PRN 1   SOB Striverdi Respimat 2 puff daily  Spiriva 18 mcg daily inhaler 1  1   Appetite     Sleep       Subjective:     HPI:  Frederick Zhong is a 65 y.o. male, patient of Dr. Warden Gayle, with significant past medical history of Gold stage I COPD, former smoker, quitting 4 months ago, who had VATS with pleural biopsy and drainage of empyema with Pleurx catheter placement in June of 2018, with the drain removed on July 2nd, with cultures and pathologies negative. He went on a trip to [place], but returned after having an increase in pain of his left chest associated with SOB. He also has complaints of weakness, fatigue, and a 17 pound weight loss. CTA of the chest showed small PE and he was started on heparin drip. It also showed nodular circumferential pleural thickening in left hemithorax concerning for malignancy, with multiple osteolytic lesions concerning form bone metastasis. A bone scan was done and he is for bone biopsy of his left ribs with IR. Palliative medicine has been consulted to assist with goals of care, code status and assist with symptom management. Subjective/Events/Discussions:  10/19/18  Sima Javed is seen at the bedside with his wife present. He is sleeping soundly in bed and preparing to go to IR for bone biopsy.   Discussed the role of recognition software. Every effort has been made to ensure accuracy; however, inadvertent computerized transcription errors may be present.

## 2018-10-22 NOTE — CARE COORDINATION
10/22/2018social work discharge planning   Initial assessment done with patient's wife as patient is confused. Patient lives with wife and has no hhc or dme. Per wife he has been independent and alert. Discussed discharge plan/tranition of care and sw role. Plan is home with family. She is usurae if equipment or hhc will be needed but does not want snf. Will follow and assist as needed.

## 2018-10-22 NOTE — PROGRESS NOTES
Subjective:    Patient is confused and agitated today. Nurse said that his behavior is unusual. It was felt that he was overmedicated and pain medications were withheld. He has no specific complaints. No evidence of a headache. poorly cooperative. Can not answer questions appropriately. Objective:    /80   Pulse 88   Temp 98.3 °F (36.8 °C) (Axillary)   Resp 20   Ht 5' 6\" (1.676 m)   Wt 172 lb (78 kg)   SpO2 99%   BMI 27.76 kg/m²     General: Confused and agitated but not in apparent severe distress  HEENT:  EOMI, PERRLA  Heart:  RRR, no murmurs  Lungs:  Normal respiratory effort. No rales or rhonchi   Abd: bowel sounds present, nondistended,  Possible mild discomfort on palpation, diffuse, no masses  Extrem:  No clubbing, cyanosis, or edema  Lymphatics: No palpable adenopathy in cervical and supraclavicular regions  Skin: Intact, no petechia or purpura  Neuro : No facial asymmetry. No focal motor weakness is apparent in the upper and lower extremities.     CBC with Differential:    Lab Results   Component Value Date    WBC 10.1 10/22/2018    RBC 3.55 10/22/2018    HGB 8.5 10/22/2018    HCT 28.7 10/22/2018     10/22/2018    MCV 80.8 10/22/2018    MCH 23.9 10/22/2018    MCHC 29.6 10/22/2018    RDW 18.1 10/22/2018    LYMPHOPCT 16.0 10/21/2018    MONOPCT 5.9 10/21/2018    BASOPCT 0.5 10/21/2018    MONOSABS 0.69 10/21/2018    LYMPHSABS 1.86 10/21/2018    EOSABS 0.84 10/21/2018    BASOSABS 0.06 10/21/2018     CMP:    Lab Results   Component Value Date     10/22/2018    K 2.7 10/22/2018    K 4.7 06/07/2018    CL 97 10/22/2018    CO2 42 10/22/2018    BUN 15 10/22/2018    CREATININE 0.6 10/22/2018    GFRAA >60 10/22/2018    LABGLOM >60 10/22/2018    GLUCOSE 123 10/22/2018    PROT 5.7 10/19/2018    LABALBU 1.7 10/19/2018    CALCIUM 11.9 10/22/2018    BILITOT 1.0 10/17/2018    ALKPHOS 454 10/17/2018    AST 37 10/17/2018    ALT 31 10/17/2018              Current Facility-Administered Medications:

## 2018-10-22 NOTE — PROGRESS NOTES
Patient became very agitated and combative, swinging and swearing at wife and this nurse. Unable to draw labs due to combative. Able to get patient to take meds crushed in pudding. Requested a sitter at this time because patient keeps trying to get out of bed and pulls at iv lines.

## 2018-10-22 NOTE — PROGRESS NOTES
Holli Rivera M.D.,[facility]  Lilly Jacob D.O., FОЛЬГА., Quan Olivo M.D. Igor Vargas M.D., Tavia Pérez M.D. Daily Pulmonary Progress Note    Patient:  Cam Quintanilla 65 y.o. male MRN: [id number]     Date of Service: 10/22/2018      Subjective      He is resting comfortably, sitter at bedside. Oxygen up to 6 liters NC. Previously combative this am, yelling out, confusion. Occasional cough, not expectorating. 24-hour events:  Confusion alteration in mental status    Review of Systems:- sitter at bedside, confused, not able to obtain   Constitutional: Denies fever, weight loss, night sweats, and fatigue  Skin: Denies pigmentation, dark lesions, and rashes   HEENT: Denies hearing loss, tinnitus, ear drainage, epistaxis, sore throat, and hoarseness. Cardiovascular: Denies palpitations, +left sided chest/rib pain  Respiratory: as above  Gastrointestinal: Denies nausea, vomiting, poor appetite, diarrhea, heartburn or reflux  Genitourinary: Denies dysuria, frequency, urgency or hematuria  Musculoskeletal: Denies myalgias, +muscle weakness, +bone pain  Neurological: Denies dizziness, vertigo, headache, and focal weakness  Psychological: Denies anxiety and depression  Endocrine: Denies heat intolerance and cold intolerance  Hematopoietic/Lymphatic: Denies bleeding problems and blood transfusions      Objective   Vitals: /80   Pulse 88   Temp 98.3 °F (36.8 °C) (Axillary)   Resp 20   Ht 5' 6\" (1.676 m)   Wt 172 lb (78 kg)   SpO2 99%   BMI 27.76 kg/m²          Physical Exam:  General Appearance: appears comfortable in no acute distress. HEENT: Normocephalic atraumatic without obvious abnormality   Neck: Lips, mucosa, and tongue normal.  Supple, symmetrical, trachea midline, no adenopathy;thyroid:  no enlargement/tenderness/nodules or JVD. Lung: Breath sounds diminished bilateral bases. Respirations   unlabored. Symmetrical expansion.   Heart: RRR, normal S1,

## 2018-10-22 NOTE — PROGRESS NOTES
Hospital Medicine    Subjective:  Pt responsive to verbal stimuli wife at bedside      Current Facility-Administered Medications:     oxyCODONE-acetaminophen (PERCOCET) 5-325 MG per tablet 1 tablet, 1 tablet, Oral, Q4H PRN, Yonatan Hamlin DO    ALPRAZolam Nicklas Dawley) tablet 0.25 mg, 0.25 mg, Oral, BID PRN, [place] Agent, APRN - CNP, 0.25 mg at 10/21/18 2310    sennosides-docusate sodium (SENOKOT-S) 8.6-50 MG tablet 2 tablet, 2 tablet, Oral, Daily, [place] Agent, APRN - CNP    mineral oil-hydrophilic petrolatum (HYDROPHOR) ointment, , Topical, BID PRN, Navin Grande DO    ferrous sulfate tablet 325 mg, 325 mg, Oral, Daily with breakfast, Toby Escamilla MD, 325 mg at 10/21/18 2473    bisacodyl (DULCOLAX) suppository 10 mg, 10 mg, Rectal, Daily PRN, Navin Grande DO, 10 mg at 10/21/18 0833    lactulose (CHRONULAC) 10 GM/15ML solution 20 g, 20 g, Oral, BID, Navin Grande DO, 20 g at 10/21/18 2027    heparin 25,000 units in dextrose 5% 250 mL infusion, 18 Units/kg/hr, Intravenous, Continuous, Poornima Hsu DO, Last Rate: 15.6 mL/hr at 10/22/18 0136, 20 Units/kg/hr at 10/22/18 0136    lidocaine (LIDODERM) 5 % 1 patch, 1 patch, Transdermal, Daily, Navin Grande DO, 1 patch at 10/21/18 6746    sodium chloride flush 0.9 % injection 10 mL, 10 mL, Intravenous, PRN, Ryder Urias,     sodium chloride flush 0.9 % injection 10 mL, 10 mL, Intravenous, 2 times per day, Navin Grande DO, 10 mL at 10/17/18 2002    sodium chloride flush 0.9 % injection 10 mL, 10 mL, Intravenous, PRN, Navin Grande DO    acetaminophen (TYLENOL) tablet 650 mg, 650 mg, Oral, Q4H PRN, Navin Grande DO, 650 mg at 10/19/18 1614    aspirin EC tablet 81 mg, 81 mg, Oral, Daily, Navin Grande DO, 81 mg at 10/21/18 0833    atorvastatin (LIPITOR) tablet 10 mg, 10 mg, Oral, Daily, Navin Grande DO, 10 mg at 10/21/18 9265    docusate sodium (COLACE) capsule 100 mg, 100 mg, Oral, BID PRN, Navin Grande DO, 100 mg at

## 2018-10-22 NOTE — PROGRESS NOTES
Follow up phone call made to floor regarding biopsy 10/19. Sonia GALAN stated patient is confused . RN states bandage still in tact, site looks good. Please call ext [phone] with any questions or concerns.

## 2018-10-23 LAB
ANA PATTERN: ABNORMAL
ANA TITER: 1.32
ANION GAP SERPL CALCULATED.3IONS-SCNC: 20 MMOL/L (ref 7–16)
ANTI-NUCLEAR ANTIBODY (ANA): POSITIVE
BETA-2 MICROGLOBULIN: 5.9 MG/L (ref 0.6–2.4)
BUN BLDV-MCNC: 14 MG/DL (ref 8–23)
CALCIUM IONIZED: 1.57 MMOL/L (ref 1.15–1.33)
CALCIUM SERPL-MCNC: 12.9 MG/DL (ref 8.6–10.2)
CHLORIDE BLD-SCNC: 96 MMOL/L (ref 98–107)
CO2: 30 MMOL/L (ref 22–29)
CREAT SERPL-MCNC: 0.6 MG/DL (ref 0.7–1.2)
GFR AFRICAN AMERICAN: >60
GFR NON-AFRICAN AMERICAN: >60 ML/MIN/1.73
GLUCOSE BLD-MCNC: 108 MG/DL (ref 74–109)
MAGNESIUM: 2.3 MG/DL (ref 1.6–2.6)
POTASSIUM SERPL-SCNC: 4.1 MMOL/L (ref 3.5–5)
SODIUM BLD-SCNC: 146 MMOL/L (ref 132–146)

## 2018-10-23 PROCEDURE — 2060000000 HC ICU INTERMEDIATE R&B

## 2018-10-23 PROCEDURE — 2580000003 HC RX 258: Performed by: INTERNAL MEDICINE

## 2018-10-23 PROCEDURE — 83735 ASSAY OF MAGNESIUM: CPT

## 2018-10-23 PROCEDURE — 36415 COLL VENOUS BLD VENIPUNCTURE: CPT

## 2018-10-23 PROCEDURE — 6370000000 HC RX 637 (ALT 250 FOR IP): Performed by: NURSE PRACTITIONER

## 2018-10-23 PROCEDURE — 82330 ASSAY OF CALCIUM: CPT

## 2018-10-23 PROCEDURE — 6370000000 HC RX 637 (ALT 250 FOR IP): Performed by: INTERNAL MEDICINE

## 2018-10-23 PROCEDURE — 2700000000 HC OXYGEN THERAPY PER DAY

## 2018-10-23 PROCEDURE — 6360000002 HC RX W HCPCS: Performed by: INTERNAL MEDICINE

## 2018-10-23 PROCEDURE — 80048 BASIC METABOLIC PNL TOTAL CA: CPT

## 2018-10-23 PROCEDURE — 99233 SBSQ HOSP IP/OBS HIGH 50: CPT | Performed by: EMERGENCY MEDICINE

## 2018-10-23 PROCEDURE — 94640 AIRWAY INHALATION TREATMENT: CPT

## 2018-10-23 RX ORDER — DEXAMETHASONE SODIUM PHOSPHATE 4 MG/ML
4 INJECTION, SOLUTION INTRA-ARTICULAR; INTRALESIONAL; INTRAMUSCULAR; INTRAVENOUS; SOFT TISSUE EVERY 6 HOURS
Status: DISCONTINUED | OUTPATIENT
Start: 2018-10-23 | End: 2018-10-28

## 2018-10-23 RX ORDER — SODIUM CHLORIDE 450 MG/100ML
INJECTION, SOLUTION INTRAVENOUS CONTINUOUS
Status: DISCONTINUED | OUTPATIENT
Start: 2018-10-23 | End: 2018-10-25

## 2018-10-23 RX ORDER — SUCRALFATE 1 G/1
1 TABLET ORAL EVERY 8 HOURS SCHEDULED
Status: DISCONTINUED | OUTPATIENT
Start: 2018-10-23 | End: 2018-10-29

## 2018-10-23 RX ADMIN — OXYCODONE AND ACETAMINOPHEN 1 TABLET: 5; 325 TABLET ORAL at 10:27

## 2018-10-23 RX ADMIN — ENOXAPARIN SODIUM 80 MG: 80 INJECTION SUBCUTANEOUS at 22:26

## 2018-10-23 RX ADMIN — DEXAMETHASONE SODIUM PHOSPHATE 4 MG: 4 INJECTION, SOLUTION INTRAMUSCULAR; INTRAVENOUS at 18:15

## 2018-10-23 RX ADMIN — SODIUM CHLORIDE AND POTASSIUM CHLORIDE: 9; 2.98 INJECTION, SOLUTION INTRAVENOUS at 02:51

## 2018-10-23 RX ADMIN — BUDESONIDE 500 MCG: 0.5 SUSPENSION RESPIRATORY (INHALATION) at 21:53

## 2018-10-23 RX ADMIN — FERROUS SULFATE TAB 325 MG (65 MG ELEMENTAL FE) 325 MG: 325 (65 FE) TAB at 10:58

## 2018-10-23 RX ADMIN — MULTIPLE VITAMINS W/ MINERALS TAB 1 TABLET: TAB at 10:58

## 2018-10-23 RX ADMIN — LACTULOSE 20 G: 20 SOLUTION ORAL at 10:46

## 2018-10-23 RX ADMIN — LINACLOTIDE 290 MCG: 145 CAPSULE, GELATIN COATED ORAL at 10:28

## 2018-10-23 RX ADMIN — PAMIDRONATE DISODIUM 90 MG: 3 INJECTION, POWDER, LYOPHILIZED, FOR SOLUTION INTRAVENOUS at 23:03

## 2018-10-23 RX ADMIN — FORMOTEROL FUMARATE DIHYDRATE 20 MCG: 20 SOLUTION RESPIRATORY (INHALATION) at 21:53

## 2018-10-23 RX ADMIN — ASPIRIN 81 MG: 81 TABLET ORAL at 10:58

## 2018-10-23 RX ADMIN — SUCRALFATE 1 G: 1 TABLET ORAL at 22:26

## 2018-10-23 RX ADMIN — ATORVASTATIN CALCIUM 10 MG: 10 TABLET, FILM COATED ORAL at 10:58

## 2018-10-23 RX ADMIN — GABAPENTIN 300 MG: 300 CAPSULE ORAL at 10:27

## 2018-10-23 RX ADMIN — LACTULOSE 20 G: 20 SOLUTION ORAL at 22:26

## 2018-10-23 RX ADMIN — GABAPENTIN 300 MG: 300 CAPSULE ORAL at 23:03

## 2018-10-23 RX ADMIN — SODIUM CHLORIDE: 4.5 INJECTION, SOLUTION INTRAVENOUS at 23:03

## 2018-10-23 RX ADMIN — OXYCODONE AND ACETAMINOPHEN 1 TABLET: 5; 325 TABLET ORAL at 02:57

## 2018-10-23 RX ADMIN — Medication 10 ML: at 23:16

## 2018-10-23 RX ADMIN — SODIUM CHLORIDE AND POTASSIUM CHLORIDE: 9; 2.98 INJECTION, SOLUTION INTRAVENOUS at 15:19

## 2018-10-23 RX ADMIN — ENOXAPARIN SODIUM 80 MG: 80 INJECTION SUBCUTANEOUS at 10:37

## 2018-10-23 RX ADMIN — OXYCODONE AND ACETAMINOPHEN 1 TABLET: 5; 325 TABLET ORAL at 18:14

## 2018-10-23 RX ADMIN — ALPRAZOLAM 0.25 MG: 0.25 TABLET ORAL at 22:27

## 2018-10-23 RX ADMIN — DEXAMETHASONE SODIUM PHOSPHATE 4 MG: 4 INJECTION, SOLUTION INTRAMUSCULAR; INTRAVENOUS at 23:04

## 2018-10-23 ASSESSMENT — PAIN SCALES - GENERAL
PAINLEVEL_OUTOF10: 7
PAINLEVEL_OUTOF10: 0

## 2018-10-23 ASSESSMENT — PAIN DESCRIPTION - DESCRIPTORS
DESCRIPTORS: ACHING;CONSTANT;DISCOMFORT
DESCRIPTORS: ACHING;CONSTANT;DISCOMFORT

## 2018-10-23 ASSESSMENT — PAIN DESCRIPTION - PAIN TYPE
TYPE: ACUTE PAIN
TYPE: ACUTE PAIN

## 2018-10-23 ASSESSMENT — PAIN DESCRIPTION - ONSET
ONSET: ON-GOING
ONSET: ON-GOING

## 2018-10-23 NOTE — PROGRESS NOTES
Patient agitated for sitter. Verbally expressed to me he was in pain, gave him prn Percocet. Swallowed the pill whole with water.

## 2018-10-23 NOTE — PROGRESS NOTES
Weight Change: 7.5% wt loss x4 months, note pt w/ pitting edema wt loss % possibly higher,     · Ideal Body Wt: 142 lb (64.4 kg), % Ideal Body 121%  · BMI Classification: BMI 25.0 - 29.9 Overweight  · Comparative Standards (Estimated Nutrition Needs):  · Estimated Daily Total Kcal: 7455-0861 (REE 1409 x 1.2 SF)  · Estimated Daily Protein (g): 85-95    Nutrition Risk Level: Moderate    Nutrition Interventions:   Continue current diet, Continue current ONS  Continued Inpatient Monitoring, Education not appropriate at this time, Coordination of Care    Nutrition Evaluation:   · Evaluation: Goals set   · Goals: Consume >75% of meals/ONS. · Monitoring: Meal Intake, Supplement Intake, Diet Tolerance, Mental Status/Confusion, Fluid Balance, Ascites/Edema, Weight, Comparative Standards, Pertinent Labs, Constipation    See Adult Nutrition Doc Flowsheet for more detail.      Electronically signed by Jessee Hamman, RD, LD on 10/23/18 at 3:23 PM    Contact Number:  Ext [phone]

## 2018-10-23 NOTE — PROGRESS NOTES
No MRG  Abdomen: Soft, NT, ND. BS present x 4 quadrants. No bruit or organomegaly. Extremities: Pedal pulses 2+ symmetric b/l. Extremities normal, no cyanosis, clubbing, or edema. Musculokeletal: No joint swelling, no muscle tenderness. ROM normal in all joints of extremities. Neurologic: Mental status: Alert and Oriented X3 . Labs:  Lab Results   Component Value Date    WBC 10.1 10/22/2018    HGB 8.5 10/22/2018    HCT 28.7 10/22/2018    MCV 80.8 10/22/2018    MCH 23.9 10/22/2018    MCHC 29.6 10/22/2018    RDW 18.1 10/22/2018     10/22/2018    MPV 9.5 10/22/2018     Lab Results   Component Value Date     10/23/2018    K 4.1 10/23/2018    K 4.7 06/07/2018    CL 96 10/23/2018    CO2 30 10/23/2018    BUN 14 10/23/2018    CREATININE 0.6 10/23/2018    LABALBU 1.7 10/19/2018    CALCIUM 12.9 10/23/2018    GFRAA >60 10/23/2018    LABGLOM >60 10/23/2018     Lab Results   Component Value Date    PROTIME 16.7 10/18/2018    INR 1.5 10/18/2018     No results for input(s): PROBNP in the last 72 hours. No results for input(s): PROCAL in the last 72 hours. This SmartLink has not been configured with any valid records. Results for Krystina Clifford (MRN [id number]) as of 10/18/2018 10:49   Ref. Range 10/18/2018 09:23   CEA Latest Ref Range: 0.0 - 5.2 ng/mL 3.4     Micro:  No results for input(s): CULTRESP in the last 72 hours. No results for input(s): LABGRAM in the last 72 hours. No results for input(s): LEGUR in the last 72 hours. No results for input(s): STREPNEUMAGU in the last 72 hours. No results for input(s): LP1UAG in the last 72 hours. 10/21/18 CXR    Findings:   Left-sided pleural thickening previously diagnosed by biopsy is   fibrosis and scarring persist. Left hemidiaphragm remains indistinct. Right hemithorax is clear.       Cardiovascular shadows are normal in appearance.  There is no evidence   of cardiac enlargement or decompensation.       Skeletal structures show no evidence of

## 2018-10-23 NOTE — PROGRESS NOTES
shortness of breath or extremity discomfort. He stated he was very weak. Very difficult to get any history from the patient due to his altered mental status. I did review with the wife that his biopsy from Friday was negative for cancer.       Goals of care: continue current management and to be determined  Advance Directives: full code  Surrogate: Spouse  Prognosis: unknown  Spiritual assessment: No spiritual distress identified   Bereavement and grief: to be determined    Past Medical History:   Diagnosis Date    Bilateral carotid bruits 12/2/2017    Enlarged prostate     Femoral-popliteal atherosclerosis ([address] 12/2/2017    [place] (hard of hearing)     Hx of endovascular stent graft for abdominal aortic aneurysm 12/2/2017    Hyperlipidemia     Pneumonia 05/2018    flu hospitalized 7 rose marie    Tobacco dependence 12/2/2017       Past Surgical History:   Procedure Laterality Date    ABDOMINAL AORTIC ANEURYSM REPAIR  12/11/2008    Berny Pryor    APPENDECTOMY      COLONOSCOPY      EYE SURGERY      imelda lense implants    OTHER SURGICAL HISTORY  3-    RLE lysis catheter    OTHER SURGICAL HISTORY  3-    Dr. Francisco Javier Will- Angioplasty with stent to right common illiac artery    IN THORSC DX LUNGS/PERICAR/MED/PLEURAL SPACE W/O BX Left 6/11/2018    BRONCH, LEFT VIDEO ASSISTED THORACOSCOPY WITH DECORTICATION performed by Franki Chun MD at [facility]  01/10/2018    cysto retro pyelo for bph       Family History   Problem Relation Age of Onset    Stroke Mother     Heart Attack Father     Heart Failure Brother     Cancer Brother     Emphysema Brother     Heart Disease Brother     Diabetes Brother        Unable to obtain family history due to altered mental status    No Known Allergies    Review of Systems:   See palliative care ROS/ESAS below; Detail ROS unable to obtain due to patient's mental status    Social history:  Veteranstatus: no  Marital status:   Living status: with

## 2018-10-24 ENCOUNTER — APPOINTMENT (OUTPATIENT)
Dept: GENERAL RADIOLOGY | Age: 78
DRG: 166 | End: 2018-10-24
Payer: MEDICARE

## 2018-10-24 LAB
ANION GAP SERPL CALCULATED.3IONS-SCNC: 13 MMOL/L (ref 7–16)
APTT: 38.6 SEC (ref 24.5–35.1)
APTT: 45.8 SEC (ref 24.5–35.1)
BUN BLDV-MCNC: 21 MG/DL (ref 8–23)
CALCIUM SERPL-MCNC: 12.1 MG/DL (ref 8.6–10.2)
CHLORIDE BLD-SCNC: 102 MMOL/L (ref 98–107)
CO2: 32 MMOL/L (ref 22–29)
CREAT SERPL-MCNC: 0.7 MG/DL (ref 0.7–1.2)
GFR AFRICAN AMERICAN: >60
GFR NON-AFRICAN AMERICAN: >60 ML/MIN/1.73
GLUCOSE BLD-MCNC: 115 MG/DL (ref 74–109)
HCT VFR BLD CALC: 34.5 % (ref 37–54)
HEMOGLOBIN: 10.5 G/DL (ref 12.5–16.5)
MCH RBC QN AUTO: 24.2 PG (ref 26–35)
MCHC RBC AUTO-ENTMCNC: 30.4 % (ref 32–34.5)
MCV RBC AUTO: 79.7 FL (ref 80–99.9)
PDW BLD-RTO: 18.5 FL (ref 11.5–15)
PLATELET # BLD: 352 E9/L (ref 130–450)
PMV BLD AUTO: 9.9 FL (ref 7–12)
POTASSIUM SERPL-SCNC: 4.3 MMOL/L (ref 3.5–5)
RBC # BLD: 4.33 E12/L (ref 3.8–5.8)
SODIUM BLD-SCNC: 147 MMOL/L (ref 132–146)
WBC # BLD: 12.8 E9/L (ref 4.5–11.5)

## 2018-10-24 PROCEDURE — 94640 AIRWAY INHALATION TREATMENT: CPT

## 2018-10-24 PROCEDURE — 6360000002 HC RX W HCPCS: Performed by: INTERNAL MEDICINE

## 2018-10-24 PROCEDURE — 2580000003 HC RX 258: Performed by: INTERNAL MEDICINE

## 2018-10-24 PROCEDURE — 71045 X-RAY EXAM CHEST 1 VIEW: CPT

## 2018-10-24 PROCEDURE — 6370000000 HC RX 637 (ALT 250 FOR IP): Performed by: INTERNAL MEDICINE

## 2018-10-24 PROCEDURE — 36415 COLL VENOUS BLD VENIPUNCTURE: CPT

## 2018-10-24 PROCEDURE — 85730 THROMBOPLASTIN TIME PARTIAL: CPT

## 2018-10-24 PROCEDURE — 2700000000 HC OXYGEN THERAPY PER DAY

## 2018-10-24 PROCEDURE — 77075 RADEX OSSEOUS SURVEY COMPL: CPT

## 2018-10-24 PROCEDURE — 6370000000 HC RX 637 (ALT 250 FOR IP): Performed by: NURSE PRACTITIONER

## 2018-10-24 PROCEDURE — 80048 BASIC METABOLIC PNL TOTAL CA: CPT

## 2018-10-24 PROCEDURE — 2060000000 HC ICU INTERMEDIATE R&B

## 2018-10-24 PROCEDURE — 99232 SBSQ HOSP IP/OBS MODERATE 35: CPT | Performed by: EMERGENCY MEDICINE

## 2018-10-24 PROCEDURE — 85027 COMPLETE CBC AUTOMATED: CPT

## 2018-10-24 RX ORDER — HEPARIN SODIUM 1000 [USP'U]/ML
40 INJECTION, SOLUTION INTRAVENOUS; SUBCUTANEOUS PRN
Status: DISCONTINUED | OUTPATIENT
Start: 2018-10-24 | End: 2018-10-26

## 2018-10-24 RX ORDER — HEPARIN SODIUM 1000 [USP'U]/ML
80 INJECTION, SOLUTION INTRAVENOUS; SUBCUTANEOUS PRN
Status: DISCONTINUED | OUTPATIENT
Start: 2018-10-24 | End: 2018-10-26

## 2018-10-24 RX ORDER — HEPARIN SODIUM 10000 [USP'U]/100ML
18 INJECTION, SOLUTION INTRAVENOUS CONTINUOUS
Status: DISCONTINUED | OUTPATIENT
Start: 2018-10-24 | End: 2018-10-25

## 2018-10-24 RX ADMIN — LACTULOSE 20 G: 20 SOLUTION ORAL at 22:11

## 2018-10-24 RX ADMIN — SUCRALFATE 1 G: 1 TABLET ORAL at 22:12

## 2018-10-24 RX ADMIN — BUDESONIDE 500 MCG: 0.5 SUSPENSION RESPIRATORY (INHALATION) at 08:27

## 2018-10-24 RX ADMIN — BUDESONIDE 500 MCG: 0.5 SUSPENSION RESPIRATORY (INHALATION) at 19:11

## 2018-10-24 RX ADMIN — Medication 10 ML: at 22:21

## 2018-10-24 RX ADMIN — IPRATROPIUM BROMIDE AND ALBUTEROL SULFATE 1 AMPULE: .5; 3 SOLUTION RESPIRATORY (INHALATION) at 08:27

## 2018-10-24 RX ADMIN — Medication 10 ML: at 09:09

## 2018-10-24 RX ADMIN — LACTULOSE 20 G: 20 SOLUTION ORAL at 09:08

## 2018-10-24 RX ADMIN — GABAPENTIN 300 MG: 300 CAPSULE ORAL at 22:11

## 2018-10-24 RX ADMIN — PANTOPRAZOLE SODIUM 40 MG: 40 TABLET, DELAYED RELEASE ORAL at 09:10

## 2018-10-24 RX ADMIN — DEXAMETHASONE SODIUM PHOSPHATE 4 MG: 4 INJECTION, SOLUTION INTRAMUSCULAR; INTRAVENOUS at 18:24

## 2018-10-24 RX ADMIN — SODIUM CHLORIDE: 4.5 INJECTION, SOLUTION INTRAVENOUS at 12:57

## 2018-10-24 RX ADMIN — FORMOTEROL FUMARATE DIHYDRATE 20 MCG: 20 SOLUTION RESPIRATORY (INHALATION) at 08:27

## 2018-10-24 RX ADMIN — ATORVASTATIN CALCIUM 10 MG: 10 TABLET, FILM COATED ORAL at 09:15

## 2018-10-24 RX ADMIN — DEXAMETHASONE SODIUM PHOSPHATE 4 MG: 4 INJECTION, SOLUTION INTRAMUSCULAR; INTRAVENOUS at 06:03

## 2018-10-24 RX ADMIN — GABAPENTIN 300 MG: 300 CAPSULE ORAL at 09:10

## 2018-10-24 RX ADMIN — HEPARIN SODIUM 2960 UNITS: 1000 INJECTION INTRAVENOUS; SUBCUTANEOUS at 22:12

## 2018-10-24 RX ADMIN — PETROLATUM: 42 OINTMENT TOPICAL at 09:10

## 2018-10-24 RX ADMIN — SUCRALFATE 1 G: 1 TABLET ORAL at 06:02

## 2018-10-24 RX ADMIN — ENOXAPARIN SODIUM 80 MG: 80 INJECTION SUBCUTANEOUS at 09:09

## 2018-10-24 RX ADMIN — MULTIPLE VITAMINS W/ MINERALS TAB 1 TABLET: TAB at 09:10

## 2018-10-24 RX ADMIN — FORMOTEROL FUMARATE DIHYDRATE 20 MCG: 20 SOLUTION RESPIRATORY (INHALATION) at 19:11

## 2018-10-24 RX ADMIN — HEPARIN SODIUM 18 UNITS/KG/HR: 10000 INJECTION, SOLUTION INTRAVENOUS at 14:10

## 2018-10-24 RX ADMIN — LINACLOTIDE 290 MCG: 145 CAPSULE, GELATIN COATED ORAL at 09:08

## 2018-10-24 RX ADMIN — FERROUS SULFATE TAB 325 MG (65 MG ELEMENTAL FE) 325 MG: 325 (65 FE) TAB at 09:10

## 2018-10-24 RX ADMIN — SENNOSIDES AND DOCUSATE SODIUM 2 TABLET: 8.6; 5 TABLET ORAL at 09:10

## 2018-10-24 RX ADMIN — ASPIRIN 81 MG: 81 TABLET ORAL at 09:10

## 2018-10-24 RX ADMIN — IPRATROPIUM BROMIDE AND ALBUTEROL SULFATE 1 AMPULE: .5; 3 SOLUTION RESPIRATORY (INHALATION) at 13:18

## 2018-10-24 RX ADMIN — DEXAMETHASONE SODIUM PHOSPHATE 4 MG: 4 INJECTION, SOLUTION INTRAMUSCULAR; INTRAVENOUS at 12:57

## 2018-10-24 ASSESSMENT — PAIN SCALES - GENERAL
PAINLEVEL_OUTOF10: 0

## 2018-10-24 NOTE — PROGRESS NOTES
than yesterday afternoon. He does respond to verbal commands this morning, but he remains generally weak and intermittently confused. Patient was admitting to some abdominal discomfort but he was denying any headache, chest pain, or increasing shortness of breath. He does complain of diffuse lower extremity discomfort bilaterally. He stated he was very weak. Very difficult to get any history from the patient due to his continued altered mental status. I did review with the wife that his biopsy from Friday was negative for cancer.         Goals of care: continue current management and to be determined  Advance Directives: full code  Surrogate: Spouse  Prognosis: unknown  Spiritual assessment: No spiritual distress identified   Bereavement and grief: to be determined    Past Medical History:   Diagnosis Date    Bilateral carotid bruits 12/2/2017    Enlarged prostate     Femoral-popliteal atherosclerosis ([address] 12/2/2017    [place] (hard of hearing)     Hx of endovascular stent graft for abdominal aortic aneurysm 12/2/2017    Hyperlipidemia     Pneumonia 05/2018    flu hospitalized 7 rose marie    Tobacco dependence 12/2/2017       Past Surgical History:   Procedure Laterality Date    ABDOMINAL AORTIC ANEURYSM REPAIR  12/11/2008    Berny Pryor    APPENDECTOMY      COLONOSCOPY      EYE SURGERY      imelda lense implants    OTHER SURGICAL HISTORY  3-    RLE lysis catheter    OTHER SURGICAL HISTORY  3-    Dr. Bonilla Coins- Angioplasty with stent to right common illiac artery    HI THORSC DX LUNGS/PERICAR/MED/PLEURAL SPACE W/O BX Left 6/11/2018    BRONCH, LEFT VIDEO ASSISTED THORACOSCOPY WITH DECORTICATION performed by Kathy Rajput MD at [facility]  01/10/2018    cysto retro pyelo for bph       Family History   Problem Relation Age of Onset    Stroke Mother     Heart Attack Father     Heart Failure Brother     Cancer Brother     Emphysema Brother     Heart Disease Brother     Diabetes

## 2018-10-24 NOTE — PROGRESS NOTES
Blanca Barker M.D.,[facility]  Darien Gil D.O., FANABELA.OLUISA., Cici Ayala M.D. Marisol Casillas M.D., Tana Bennett M.D. Daily Pulmonary Progress Note    Patient:  Josias Mathews 65 y.o. male MRN: [id number]     Date of Service: 10/24/2018      Subjective      He is resting comfortably, up to chair, sitter at bedside. Oxygen at 3 liters NC. Remains confused. Wife at bedside. Sitter present. Occasional cough, not expectorating. 24-hour events:  Confusion alteration in mental status    Review of Systems:- sitter at bedside, confused, not able to obtain   Constitutional: Denies fever, weight loss, night sweats, and fatigue  Skin: Denies pigmentation, dark lesions, and rashes   HEENT: Denies hearing loss, tinnitus, ear drainage, epistaxis, sore throat, and hoarseness. Cardiovascular: Denies palpitations, +left sided chest/rib pain  Respiratory: as above  Gastrointestinal: Denies nausea, vomiting, poor appetite, diarrhea, heartburn or reflux  Genitourinary: Denies dysuria, frequency, urgency or hematuria  Musculoskeletal: Denies myalgias, +muscle weakness, +bone pain  Neurological: Denies dizziness, vertigo, headache, and focal weakness  Psychological: Denies anxiety and depression  Endocrine: Denies heat intolerance and cold intolerance  Hematopoietic/Lymphatic: Denies bleeding problems and blood transfusions      Objective   Vitals: /80   Pulse 95   Temp 97.8 °F (36.6 °C) (Temporal)   Resp 18   Ht 5' 6\" (1.676 m)   Wt 163 lb (73.9 kg)   SpO2 94%   BMI 26.31 kg/m²      Physical Exam:  General Appearance: appears comfortable in no acute distress. HEENT: Normocephalic atraumatic without obvious abnormality   Neck: Lips, mucosa, and tongue normal.  Supple, symmetrical, trachea midline, no adenopathy;thyroid:  no enlargement/tenderness/nodules or JVD. Lung: Breath sounds diminished bilateral bases. Respirations   unlabored. Symmetrical expansion.   Heart: RRR, normal

## 2018-10-24 NOTE — PROGRESS NOTES
10/24/2018 11:35 AM   [facility]     Subjective:   65year old gentleman s/p VATS in June left side (fibrosis seen) readmitted with some thickening left pelural, anemia, ,multiple lytic bone lesions. Work up for myeloma negative. He also had CT guided bx of one of the rib lesions yesterday. Patient admitted with acute PE and confusion with hypercalcemia   Ct of head neg    Rib biopsy;    BONE BIOPSY, RIGHT RIB LESION    Clinical Notes:  Operative Procedure: Right rib lesion biopsy. Preoperative Dx:   Right rib lesion. Microscopic Evaluation: Was performed. Gross Description:   Submitted in 1 part in formalin labeled \"Navin Steel, right rib biopsy\" are 2 segments of white/tan osseous tissue  which measure 0.3 x 0.3 x 0.3 cm and 0.4 x 0.3 x 0.3 cm. Entirely  submitted after mild decalcification.  Block labeled A1.  (JENNIFER:OBI)  Current meds:    dexamethasone (DECADRON) injection 4 mg Q6H   sucralfate (CARAFATE) tablet 1 g 3 times per day   0.45 % sodium chloride infusion Continuous   oxyCODONE-acetaminophen (PERCOCET) 5-325 MG per tablet 1 tablet Q4H PRN   formoterol (PERFOROMIST) nebulizer solution 20 mcg BID   budesonide (PULMICORT) nebulizer suspension 500 mcg BID   ipratropium-albuterol (DUONEB) nebulizer solution 1 ampule Q4H While awake   ALPRAZolam (XANAX) tablet 0.25 mg BID PRN   sennosides-docusate sodium (SENOKOT-S) 8.6-50 MG tablet 2 tablet Daily   mineral oil-hydrophilic petrolatum (HYDROPHOR) ointment BID PRN   ferrous sulfate tablet 325 mg Daily with breakfast   bisacodyl (DULCOLAX) suppository 10 mg Daily PRN   lactulose (CHRONULAC) 10 GM/15ML solution 20 g BID   lidocaine (LIDODERM) 5 % 1 patch Daily   acetaminophen (TYLENOL) tablet 650 mg Q4H PRN   aspirin EC tablet 81 mg Daily   atorvastatin (LIPITOR) tablet 10 mg Daily   docusate sodium (COLACE) capsule 100 mg BID PRN   gabapentin (NEURONTIN) capsule 300 mg TID   linaclotide (LINZESS) capsule 290 mcg Daily   therapeutic

## 2018-10-25 LAB
ANION GAP SERPL CALCULATED.3IONS-SCNC: 14 MMOL/L (ref 7–16)
APTT: 41.9 SEC (ref 24.5–35.1)
BUN BLDV-MCNC: 22 MG/DL (ref 8–23)
CALCIUM SERPL-MCNC: 11.4 MG/DL (ref 8.6–10.2)
CHLORIDE BLD-SCNC: 97 MMOL/L (ref 98–107)
CO2: 34 MMOL/L (ref 22–29)
CREAT SERPL-MCNC: 0.7 MG/DL (ref 0.7–1.2)
GFR AFRICAN AMERICAN: >60
GFR NON-AFRICAN AMERICAN: >60 ML/MIN/1.73
GLUCOSE BLD-MCNC: 99 MG/DL (ref 74–109)
HCT VFR BLD CALC: 29.4 % (ref 37–54)
HEMOGLOBIN: 9 G/DL (ref 12.5–16.5)
MCH RBC QN AUTO: 24.1 PG (ref 26–35)
MCHC RBC AUTO-ENTMCNC: 30.6 % (ref 32–34.5)
MCV RBC AUTO: 78.6 FL (ref 80–99.9)
PARATHYROID HORMONE INTACT: 10 PG/ML (ref 15–65)
PDW BLD-RTO: 18.5 FL (ref 11.5–15)
PLATELET # BLD: 471 E9/L (ref 130–450)
PMV BLD AUTO: 10 FL (ref 7–12)
POTASSIUM SERPL-SCNC: 2.6 MMOL/L (ref 3.5–5)
POTASSIUM SERPL-SCNC: 2.8 MMOL/L (ref 3.5–5)
RBC # BLD: 3.74 E12/L (ref 3.8–5.8)
SODIUM BLD-SCNC: 145 MMOL/L (ref 132–146)
WBC # BLD: 14 E9/L (ref 4.5–11.5)

## 2018-10-25 PROCEDURE — 6370000000 HC RX 637 (ALT 250 FOR IP): Performed by: INTERNAL MEDICINE

## 2018-10-25 PROCEDURE — 6360000002 HC RX W HCPCS: Performed by: INTERNAL MEDICINE

## 2018-10-25 PROCEDURE — 88262 CHROMOSOME ANALYSIS 15-20: CPT

## 2018-10-25 PROCEDURE — 6360000002 HC RX W HCPCS

## 2018-10-25 PROCEDURE — 80048 BASIC METABOLIC PNL TOTAL CA: CPT

## 2018-10-25 PROCEDURE — 2500000003 HC RX 250 WO HCPCS: Performed by: INTERNAL MEDICINE

## 2018-10-25 PROCEDURE — 88311 DECALCIFY TISSUE: CPT

## 2018-10-25 PROCEDURE — 94640 AIRWAY INHALATION TREATMENT: CPT

## 2018-10-25 PROCEDURE — 85027 COMPLETE CBC AUTOMATED: CPT

## 2018-10-25 PROCEDURE — 2580000003 HC RX 258: Performed by: INTERNAL MEDICINE

## 2018-10-25 PROCEDURE — 36415 COLL VENOUS BLD VENIPUNCTURE: CPT

## 2018-10-25 PROCEDURE — 83970 ASSAY OF PARATHORMONE: CPT

## 2018-10-25 PROCEDURE — 6370000000 HC RX 637 (ALT 250 FOR IP): Performed by: NURSE PRACTITIONER

## 2018-10-25 PROCEDURE — 88342 IMHCHEM/IMCYTCHM 1ST ANTB: CPT

## 2018-10-25 PROCEDURE — 99232 SBSQ HOSP IP/OBS MODERATE 35: CPT | Performed by: EMERGENCY MEDICINE

## 2018-10-25 PROCEDURE — 2060000000 HC ICU INTERMEDIATE R&B

## 2018-10-25 PROCEDURE — 88184 FLOWCYTOMETRY/ TC 1 MARKER: CPT

## 2018-10-25 PROCEDURE — 85730 THROMBOPLASTIN TIME PARTIAL: CPT

## 2018-10-25 PROCEDURE — 88185 FLOWCYTOMETRY/TC ADD-ON: CPT

## 2018-10-25 PROCEDURE — 82542 COL CHROMOTOGRAPHY QUAL/QUAN: CPT

## 2018-10-25 PROCEDURE — 88313 SPECIAL STAINS GROUP 2: CPT

## 2018-10-25 PROCEDURE — 88305 TISSUE EXAM BY PATHOLOGIST: CPT

## 2018-10-25 PROCEDURE — 88341 IMHCHEM/IMCYTCHM EA ADD ANTB: CPT

## 2018-10-25 PROCEDURE — 84132 ASSAY OF SERUM POTASSIUM: CPT

## 2018-10-25 PROCEDURE — 88237 TISSUE CULTURE BONE MARROW: CPT

## 2018-10-25 PROCEDURE — 07DR3ZX EXTRACTION OF ILIAC BONE MARROW, PERCUTANEOUS APPROACH, DIAGNOSTIC: ICD-10-PCS | Performed by: INTERNAL MEDICINE

## 2018-10-25 RX ORDER — MORPHINE SULFATE 2 MG/ML
INJECTION, SOLUTION INTRAMUSCULAR; INTRAVENOUS
Status: COMPLETED
Start: 2018-10-25 | End: 2018-10-25

## 2018-10-25 RX ORDER — POTASSIUM CHLORIDE AND SODIUM CHLORIDE 450; 150 MG/100ML; MG/100ML
INJECTION, SOLUTION INTRAVENOUS CONTINUOUS
Status: DISCONTINUED | OUTPATIENT
Start: 2018-10-25 | End: 2018-10-29

## 2018-10-25 RX ORDER — POTASSIUM BICARBONATE 25 MEQ/1
50 TABLET, EFFERVESCENT ORAL ONCE
Status: DISCONTINUED | OUTPATIENT
Start: 2018-10-26 | End: 2018-10-25

## 2018-10-25 RX ORDER — POTASSIUM BICARBONATE 25 MEQ/1
50 TABLET, EFFERVESCENT ORAL ONCE
Status: DISCONTINUED | OUTPATIENT
Start: 2018-10-25 | End: 2018-10-31 | Stop reason: HOSPADM

## 2018-10-25 RX ORDER — POTASSIUM BICARBONATE 25 MEQ/1
50 TABLET, EFFERVESCENT ORAL ONCE
Status: COMPLETED | OUTPATIENT
Start: 2018-10-25 | End: 2018-10-25

## 2018-10-25 RX ORDER — POTASSIUM CHLORIDE 20 MEQ/1
40 TABLET, EXTENDED RELEASE ORAL ONCE
Status: COMPLETED | OUTPATIENT
Start: 2018-10-26 | End: 2018-10-26

## 2018-10-25 RX ORDER — LIDOCAINE HYDROCHLORIDE AND EPINEPHRINE 10; 10 MG/ML; UG/ML
20 INJECTION, SOLUTION INFILTRATION; PERINEURAL ONCE
Status: COMPLETED | OUTPATIENT
Start: 2018-10-25 | End: 2018-10-25

## 2018-10-25 RX ORDER — POTASSIUM CHLORIDE 20 MEQ/1
40 TABLET, EXTENDED RELEASE ORAL ONCE
Status: COMPLETED | OUTPATIENT
Start: 2018-10-25 | End: 2018-10-25

## 2018-10-25 RX ORDER — LORAZEPAM 2 MG/ML
0.5 INJECTION INTRAMUSCULAR ONCE
Status: COMPLETED | OUTPATIENT
Start: 2018-10-25 | End: 2018-10-25

## 2018-10-25 RX ADMIN — Medication 10 ML: at 22:03

## 2018-10-25 RX ADMIN — Medication 10 ML: at 11:06

## 2018-10-25 RX ADMIN — POTASSIUM CHLORIDE AND SODIUM CHLORIDE: 450; 150 INJECTION, SOLUTION INTRAVENOUS at 10:20

## 2018-10-25 RX ADMIN — FORMOTEROL FUMARATE DIHYDRATE 20 MCG: 20 SOLUTION RESPIRATORY (INHALATION) at 08:01

## 2018-10-25 RX ADMIN — HEPARIN SODIUM 2960 UNITS: 1000 INJECTION INTRAVENOUS; SUBCUTANEOUS at 08:07

## 2018-10-25 RX ADMIN — IPRATROPIUM BROMIDE AND ALBUTEROL SULFATE 1 AMPULE: .5; 3 SOLUTION RESPIRATORY (INHALATION) at 15:28

## 2018-10-25 RX ADMIN — MORPHINE SULFATE 2 MG: 2 INJECTION, SOLUTION INTRAMUSCULAR; INTRAVENOUS at 11:05

## 2018-10-25 RX ADMIN — LIDOCAINE HYDROCHLORIDE AND EPINEPHRINE 20 ML: 10; 10 INJECTION, SOLUTION INFILTRATION; PERINEURAL at 11:07

## 2018-10-25 RX ADMIN — SODIUM CHLORIDE: 4.5 INJECTION, SOLUTION INTRAVENOUS at 00:24

## 2018-10-25 RX ADMIN — DEXAMETHASONE SODIUM PHOSPHATE 4 MG: 4 INJECTION, SOLUTION INTRAMUSCULAR; INTRAVENOUS at 01:09

## 2018-10-25 RX ADMIN — LORAZEPAM 0.5 MG: 2 INJECTION INTRAMUSCULAR; INTRAVENOUS at 10:35

## 2018-10-25 RX ADMIN — DEXAMETHASONE SODIUM PHOSPHATE 4 MG: 4 INJECTION, SOLUTION INTRAMUSCULAR; INTRAVENOUS at 06:09

## 2018-10-25 RX ADMIN — DEXAMETHASONE SODIUM PHOSPHATE 4 MG: 4 INJECTION, SOLUTION INTRAMUSCULAR; INTRAVENOUS at 13:47

## 2018-10-25 RX ADMIN — FORMOTEROL FUMARATE DIHYDRATE 20 MCG: 20 SOLUTION RESPIRATORY (INHALATION) at 19:38

## 2018-10-25 RX ADMIN — POTASSIUM CHLORIDE AND SODIUM CHLORIDE: 450; 150 INJECTION, SOLUTION INTRAVENOUS at 20:27

## 2018-10-25 RX ADMIN — LACTULOSE 20 G: 20 SOLUTION ORAL at 22:03

## 2018-10-25 RX ADMIN — Medication 10 ML: at 06:09

## 2018-10-25 RX ADMIN — ENOXAPARIN SODIUM 80 MG: 80 INJECTION, SOLUTION INTRAVENOUS; SUBCUTANEOUS at 22:04

## 2018-10-25 RX ADMIN — BUDESONIDE 500 MCG: 0.5 SUSPENSION RESPIRATORY (INHALATION) at 19:38

## 2018-10-25 RX ADMIN — POTASSIUM BICARBONATE 50 MEQ: 25 TABLET, EFFERVESCENT ORAL at 22:03

## 2018-10-25 RX ADMIN — POTASSIUM CHLORIDE 40 MEQ: 20 TABLET, EXTENDED RELEASE ORAL at 23:51

## 2018-10-25 RX ADMIN — DEXAMETHASONE SODIUM PHOSPHATE 4 MG: 4 INJECTION, SOLUTION INTRAMUSCULAR; INTRAVENOUS at 22:04

## 2018-10-25 RX ADMIN — Medication 10 ML: at 01:10

## 2018-10-25 RX ADMIN — SUCRALFATE 1 G: 1 TABLET ORAL at 22:04

## 2018-10-25 RX ADMIN — OXYCODONE AND ACETAMINOPHEN 1 TABLET: 5; 325 TABLET ORAL at 02:43

## 2018-10-25 RX ADMIN — BUDESONIDE 500 MCG: 0.5 SUSPENSION RESPIRATORY (INHALATION) at 08:01

## 2018-10-25 RX ADMIN — GABAPENTIN 300 MG: 300 CAPSULE ORAL at 22:04

## 2018-10-25 ASSESSMENT — PAIN DESCRIPTION - ORIENTATION
ORIENTATION: LEFT
ORIENTATION: LOWER

## 2018-10-25 ASSESSMENT — PAIN DESCRIPTION - DESCRIPTORS: DESCRIPTORS: ACHING;DISCOMFORT

## 2018-10-25 ASSESSMENT — PAIN SCALES - GENERAL
PAINLEVEL_OUTOF10: 0
PAINLEVEL_OUTOF10: 7
PAINLEVEL_OUTOF10: 2
PAINLEVEL_OUTOF10: 0
PAINLEVEL_OUTOF10: 0
PAINLEVEL_OUTOF10: 10
PAINLEVEL_OUTOF10: 0
PAINLEVEL_OUTOF10: 10
PAINLEVEL_OUTOF10: 0

## 2018-10-25 ASSESSMENT — PAIN DESCRIPTION - LOCATION
LOCATION: BACK
LOCATION: BACK

## 2018-10-25 ASSESSMENT — PAIN DESCRIPTION - PAIN TYPE
TYPE: ACUTE PAIN

## 2018-10-25 ASSESSMENT — PAIN DESCRIPTION - ONSET: ONSET: ON-GOING

## 2018-10-25 ASSESSMENT — PAIN DESCRIPTION - FREQUENCY: FREQUENCY: CONTINUOUS

## 2018-10-25 NOTE — PROGRESS NOTES
bisacodyl (DULCOLAX) suppository 10 mg, 10 mg, Rectal, Daily PRN, Navin P Popovec, DO, 10 mg at 10/21/18 0833    lactulose (CHRONULAC) 10 GM/15ML solution 20 g, 20 g, Oral, BID, Navin P Popovec, DO, 20 g at 10/24/18 2211    lidocaine (LIDODERM) 5 % 1 patch, 1 patch, Transdermal, Daily, Navin P Popovec, DO, 1 patch at 10/24/18 0910    acetaminophen (TYLENOL) tablet 650 mg, 650 mg, Oral, Q4H PRN, Navin P Popovec, DO, 650 mg at 10/19/18 1614    aspirin EC tablet 81 mg, 81 mg, Oral, Daily, Navin P Popovec, DO, 81 mg at 10/24/18 0910    atorvastatin (LIPITOR) tablet 10 mg, 10 mg, Oral, Daily, Navin P Popovec, DO, 10 mg at 10/24/18 0915    docusate sodium (COLACE) capsule 100 mg, 100 mg, Oral, BID PRN, Navin P Popovec, DO, 100 mg at 10/22/18 0936    gabapentin (NEURONTIN) capsule 300 mg, 300 mg, Oral, TID, Navin P Popovec, DO, 300 mg at 10/24/18 2211    linaclotide (LINZESS) capsule 290 mcg, 290 mcg, Oral, Daily, Navin P Popovec, DO, 290 mcg at 10/24/18 0908    therapeutic multivitamin-minerals 1 tablet, 1 tablet, Oral, Daily, Navin P Popovec, DO, 1 tablet at 10/24/18 0910    pantoprazole (PROTONIX) tablet 40 mg, 40 mg, Oral, Daily, Navin P Popovec, DO, 40 mg at 10/24/18 0910    sodium chloride flush 0.9 % injection 10 mL, 10 mL, Intravenous, 2 times per day, Navin P Popovec, DO, 10 mL at 10/24/18 2221    sodium chloride flush 0.9 % injection 10 mL, 10 mL, Intravenous, PRN, Navin P Popovec, DO, 10 mL at 10/19/18 0653    magnesium hydroxide (MILK OF MAGNESIA) 400 MG/5ML suspension 30 mL, 30 mL, Oral, Daily PRN, Navin Grande DO, 30 mL at 10/20/18 0945    ondansetron (ZOFRAN) injection 4 mg, 4 mg, Intravenous, Q6H PRN, Navin Grande DO    perflutren lipid microspheres (DEFINITY) injection 1.65 mg, 1.5 mL, Intravenous, ONCE PRN, Wale Marvin, APRN    Objective:    /73   Pulse 96   Temp 98 °F (36.7 °C) (Temporal)   Resp 18   Ht 5' 6\" (1.676 m)   Wt 159 lb 4 oz (72.2 kg)   SpO2 100%   BMI 25.70 kg/m²

## 2018-10-25 NOTE — PROGRESS NOTES
Upon entering room to co-sign for medication, patient's wife expressed concern for getting  out of room, states Ilya Menendez is going stir crazy in here. Can't we take him outside? \"  I explained to the wife that the patient is unable to leave the unit because he is on a heart monitor and we lose signal.  Patients wife stated \"I see people all the time walking outside and they have oxygen tanks\". I then explained to the wife that the patient is on a heparin drip and because of this he is unable to leave the unit based on the type of drug that is running. I offered to bring a wheelchair to room and patient can be wheeled around unit. Patient wife agreed and wheelchair delivered to room.

## 2018-10-25 NOTE — PROGRESS NOTES
Messaged doctor Deirdre Epps at this time to see when bone biopsy to be done due to needing to stop heparin drip two hours prior to procedure. Awaiting call back.

## 2018-10-26 LAB
ANION GAP SERPL CALCULATED.3IONS-SCNC: 13 MMOL/L (ref 7–16)
BUN BLDV-MCNC: 26 MG/DL (ref 8–23)
CALCIUM SERPL-MCNC: 10.5 MG/DL (ref 8.6–10.2)
CHLORIDE BLD-SCNC: 95 MMOL/L (ref 98–107)
CO2: 32 MMOL/L (ref 22–29)
CREAT SERPL-MCNC: 0.7 MG/DL (ref 0.7–1.2)
GFR AFRICAN AMERICAN: >60
GFR NON-AFRICAN AMERICAN: >60 ML/MIN/1.73
GLUCOSE BLD-MCNC: 93 MG/DL (ref 74–109)
HCT VFR BLD CALC: 31.4 % (ref 37–54)
HEMOGLOBIN: 9.6 G/DL (ref 12.5–16.5)
MCH RBC QN AUTO: 24.4 PG (ref 26–35)
MCHC RBC AUTO-ENTMCNC: 30.6 % (ref 32–34.5)
MCV RBC AUTO: 79.7 FL (ref 80–99.9)
PDW BLD-RTO: 18.6 FL (ref 11.5–15)
PLATELET # BLD: 492 E9/L (ref 130–450)
PMV BLD AUTO: 10 FL (ref 7–12)
POTASSIUM SERPL-SCNC: 3.2 MMOL/L (ref 3.5–5)
RBC # BLD: 3.94 E12/L (ref 3.8–5.8)
SODIUM BLD-SCNC: 140 MMOL/L (ref 132–146)
WBC # BLD: 9.9 E9/L (ref 4.5–11.5)

## 2018-10-26 PROCEDURE — 80048 BASIC METABOLIC PNL TOTAL CA: CPT

## 2018-10-26 PROCEDURE — 6370000000 HC RX 637 (ALT 250 FOR IP): Performed by: INTERNAL MEDICINE

## 2018-10-26 PROCEDURE — 6360000002 HC RX W HCPCS: Performed by: INTERNAL MEDICINE

## 2018-10-26 PROCEDURE — 85027 COMPLETE CBC AUTOMATED: CPT

## 2018-10-26 PROCEDURE — 94640 AIRWAY INHALATION TREATMENT: CPT

## 2018-10-26 PROCEDURE — 2060000000 HC ICU INTERMEDIATE R&B

## 2018-10-26 PROCEDURE — 36415 COLL VENOUS BLD VENIPUNCTURE: CPT

## 2018-10-26 PROCEDURE — 6370000000 HC RX 637 (ALT 250 FOR IP): Performed by: NURSE PRACTITIONER

## 2018-10-26 PROCEDURE — 2580000003 HC RX 258: Performed by: INTERNAL MEDICINE

## 2018-10-26 RX ORDER — POTASSIUM CHLORIDE 20 MEQ/1
40 TABLET, EXTENDED RELEASE ORAL ONCE
Status: COMPLETED | OUTPATIENT
Start: 2018-10-26 | End: 2018-10-26

## 2018-10-26 RX ADMIN — DEXAMETHASONE SODIUM PHOSPHATE 4 MG: 4 INJECTION, SOLUTION INTRAMUSCULAR; INTRAVENOUS at 19:54

## 2018-10-26 RX ADMIN — LACTULOSE 20 G: 20 SOLUTION ORAL at 19:54

## 2018-10-26 RX ADMIN — Medication 10 ML: at 15:17

## 2018-10-26 RX ADMIN — FORMOTEROL FUMARATE DIHYDRATE 20 MCG: 20 SOLUTION RESPIRATORY (INHALATION) at 21:04

## 2018-10-26 RX ADMIN — POTASSIUM CHLORIDE 40 MEQ: 20 TABLET, EXTENDED RELEASE ORAL at 15:18

## 2018-10-26 RX ADMIN — DEXAMETHASONE SODIUM PHOSPHATE 4 MG: 4 INJECTION, SOLUTION INTRAMUSCULAR; INTRAVENOUS at 15:17

## 2018-10-26 RX ADMIN — OXYCODONE AND ACETAMINOPHEN 1 TABLET: 5; 325 TABLET ORAL at 15:18

## 2018-10-26 RX ADMIN — LINACLOTIDE 290 MCG: 145 CAPSULE, GELATIN COATED ORAL at 08:03

## 2018-10-26 RX ADMIN — Medication 10 ML: at 19:55

## 2018-10-26 RX ADMIN — ENOXAPARIN SODIUM 80 MG: 80 INJECTION, SOLUTION INTRAVENOUS; SUBCUTANEOUS at 08:04

## 2018-10-26 RX ADMIN — GABAPENTIN 300 MG: 300 CAPSULE ORAL at 08:04

## 2018-10-26 RX ADMIN — GABAPENTIN 300 MG: 300 CAPSULE ORAL at 15:17

## 2018-10-26 RX ADMIN — POTASSIUM CHLORIDE AND SODIUM CHLORIDE: 450; 150 INJECTION, SOLUTION INTRAVENOUS at 15:21

## 2018-10-26 RX ADMIN — POTASSIUM CHLORIDE 40 MEQ: 20 TABLET, EXTENDED RELEASE ORAL at 04:46

## 2018-10-26 RX ADMIN — MULTIPLE VITAMINS W/ MINERALS TAB 1 TABLET: TAB at 08:04

## 2018-10-26 RX ADMIN — BUDESONIDE 500 MCG: 0.5 SUSPENSION RESPIRATORY (INHALATION) at 21:04

## 2018-10-26 RX ADMIN — IPRATROPIUM BROMIDE AND ALBUTEROL SULFATE 1 AMPULE: .5; 3 SOLUTION RESPIRATORY (INHALATION) at 16:10

## 2018-10-26 RX ADMIN — IPRATROPIUM BROMIDE AND ALBUTEROL SULFATE 1 AMPULE: .5; 3 SOLUTION RESPIRATORY (INHALATION) at 21:04

## 2018-10-26 RX ADMIN — SUCRALFATE 1 G: 1 TABLET ORAL at 15:17

## 2018-10-26 RX ADMIN — Medication 10 ML: at 08:04

## 2018-10-26 RX ADMIN — POTASSIUM CHLORIDE AND SODIUM CHLORIDE: 450; 150 INJECTION, SOLUTION INTRAVENOUS at 06:14

## 2018-10-26 RX ADMIN — ASPIRIN 81 MG: 81 TABLET ORAL at 08:04

## 2018-10-26 RX ADMIN — ATORVASTATIN CALCIUM 10 MG: 10 TABLET, FILM COATED ORAL at 08:04

## 2018-10-26 RX ADMIN — OXYCODONE AND ACETAMINOPHEN 1 TABLET: 5; 325 TABLET ORAL at 08:03

## 2018-10-26 RX ADMIN — LACTULOSE 20 G: 20 SOLUTION ORAL at 08:07

## 2018-10-26 RX ADMIN — SENNOSIDES AND DOCUSATE SODIUM 2 TABLET: 8.6; 5 TABLET ORAL at 08:03

## 2018-10-26 RX ADMIN — ENOXAPARIN SODIUM 80 MG: 80 INJECTION, SOLUTION INTRAVENOUS; SUBCUTANEOUS at 19:54

## 2018-10-26 RX ADMIN — DEXAMETHASONE SODIUM PHOSPHATE 4 MG: 4 INJECTION, SOLUTION INTRAMUSCULAR; INTRAVENOUS at 04:44

## 2018-10-26 RX ADMIN — DEXAMETHASONE SODIUM PHOSPHATE 4 MG: 4 INJECTION, SOLUTION INTRAMUSCULAR; INTRAVENOUS at 08:04

## 2018-10-26 RX ADMIN — FERROUS SULFATE TAB 325 MG (65 MG ELEMENTAL FE) 325 MG: 325 (65 FE) TAB at 08:04

## 2018-10-26 RX ADMIN — PANTOPRAZOLE SODIUM 40 MG: 40 TABLET, DELAYED RELEASE ORAL at 09:11

## 2018-10-26 RX ADMIN — GABAPENTIN 300 MG: 300 CAPSULE ORAL at 19:54

## 2018-10-26 ASSESSMENT — PAIN DESCRIPTION - PROGRESSION
CLINICAL_PROGRESSION: NOT CHANGED

## 2018-10-26 ASSESSMENT — PAIN DESCRIPTION - LOCATION
LOCATION: ABDOMEN
LOCATION: CHEST;RIB CAGE

## 2018-10-26 ASSESSMENT — PAIN SCALES - GENERAL
PAINLEVEL_OUTOF10: 0
PAINLEVEL_OUTOF10: 7
PAINLEVEL_OUTOF10: 7

## 2018-10-26 ASSESSMENT — PAIN DESCRIPTION - FREQUENCY
FREQUENCY: INTERMITTENT
FREQUENCY: INTERMITTENT

## 2018-10-26 ASSESSMENT — PAIN DESCRIPTION - DESCRIPTORS
DESCRIPTORS: ACHING;CONSTANT;DISCOMFORT
DESCRIPTORS: ACHING;CONSTANT;DISCOMFORT

## 2018-10-26 ASSESSMENT — PAIN DESCRIPTION - PAIN TYPE
TYPE: ACUTE PAIN
TYPE: ACUTE PAIN

## 2018-10-26 ASSESSMENT — PAIN DESCRIPTION - ORIENTATION
ORIENTATION: LEFT
ORIENTATION: LEFT

## 2018-10-26 ASSESSMENT — PAIN DESCRIPTION - ONSET
ONSET: GRADUAL
ONSET: GRADUAL

## 2018-10-26 NOTE — PROGRESS NOTES
Hospital Medicine    Subjective:  Pt seen this am sitting up in chair wife at bedside      Current Facility-Administered Medications:     potassium bicarbonate (K-LYTE) disintegrating tablet 50 mEq, 50 mEq, Oral, Once, Ebonie Turner DO    0.45 % NaCl with KCl 20 mEq infusion, , Intravenous, Continuous, Astrid Hamlin DO, Last Rate: 100 mL/hr at 10/25/18 2027    enoxaparin (LOVENOX) injection 80 mg, 1 mg/kg, Subcutaneous, BID, Peewee Guzman MD    heparin (porcine) injection 5,910 Units, 80 Units/kg, Intravenous, PRN, Peewee Guzman MD    heparin (porcine) injection 2,960 Units, 40 Units/kg, Intravenous, PRN, Peewee Guzman MD, 2,960 Units at 10/25/18 0807    dexamethasone (DECADRON) injection 4 mg, 4 mg, Intravenous, Q6H, Luan Snyder MD, 4 mg at 10/25/18 1347    sucralfate (CARAFATE) tablet 1 g, 1 g, Oral, 3 times per day, Luan Snyder MD, 1 g at 10/24/18 2212    oxyCODONE-acetaminophen (PERCOCET) 5-325 MG per tablet 1 tablet, 1 tablet, Oral, Q4H PRN, Astrid Hamlin DO, 1 tablet at 10/25/18 0243    formoterol (PERFOROMIST) nebulizer solution 20 mcg, 20 mcg, Nebulization, BID, Peewee Guzman MD, 20 mcg at 10/25/18 1938    budesonide (PULMICORT) nebulizer suspension 500 mcg, 500 mcg, Nebulization, BID, Peewee Guzman MD, 500 mcg at 10/25/18 1938    ipratropium-albuterol (DUONEB) nebulizer solution 1 ampule, 1 ampule, Inhalation, Q4H While awake, Peewee Guzman MD, 1 ampule at 10/25/18 1528    ALPRAZolam Washita Best) tablet 0.25 mg, 0.25 mg, Oral, BID PRN, Gail Osman, APRN - CNP, 0.25 mg at 10/23/18 2227    sennosides-docusate sodium (SENOKOT-S) 8.6-50 MG tablet 2 tablet, 2 tablet, Oral, Daily, Gail Osman, APRN - CNP, 2 tablet at 10/24/18 8165    mineral oil-hydrophilic petrolatum (HYDROPHOR) ointment, , Topical, BID PRN, Navin Grande DO    ferrous sulfate tablet 325 mg, 325 mg, Oral, Daily with breakfast, Carol Escamilla MD, 325 mg at

## 2018-10-26 NOTE — PROGRESS NOTES
Jason Miguel M.D.,[facility]  Franko Landin D.O., F.A.C.O.I., Tung Mercer M.D. [place] M.D., Paige Barth M.D. Daily Pulmonary Progress Note    Patient:  Dana Luevano 65 y.o. male MRN: [id number]     Date of Service: 10/26/2018      Subjective      Patient is lying in bed, oxygen off. Appears comfortable with breathing. More conversive, [place]. Occasional moist cough, not expectorating. No fever or chills. 24-hour events:  Bone biopsy completed. Off oxygen. IV fluids per primary at 100/hr. Review of Systems:- sitter at bedside, confused, not able to obtain   Constitutional: Denies fever, weight loss, night sweats, and fatigue  Skin: Denies pigmentation, dark lesions, and rashes   HEENT: Denies hearing loss, tinnitus, ear drainage, epistaxis, sore throat, and hoarseness. Cardiovascular: Denies palpitations, +left sided chest/rib pain improving  Respiratory: as above  Gastrointestinal: Denies nausea, vomiting, poor appetite, diarrhea, heartburn or reflux  Genitourinary: Denies dysuria, frequency, urgency or hematuria  Musculoskeletal: Denies myalgias, +muscle weakness  Neurological: Denies dizziness, vertigo, headache, and focal weakness  Psychological: Denies anxiety and depression  Endocrine: Denies heat intolerance and cold intolerance  Hematopoietic/Lymphatic: Denies bleeding problems and blood transfusions      Objective   Vitals: /63   Pulse 104   Temp 99.3 °F (37.4 °C) (Temporal)   Resp 16   Ht 5' 6\" (1.676 m)   Wt 159 lb 4 oz (72.2 kg)   SpO2 94%   BMI 25.70 kg/m²      Physical Exam:  General Appearance: appears comfortable in no acute distress. HEENT: Normocephalic atraumatic without obvious abnormality   Neck: Lips, mucosa, and tongue normal.  Supple, symmetrical, trachea midline, no adenopathy;thyroid:  no enlargement/tenderness/nodules or JVD. Lung: Breath sounds diminished bilateral bases. Respirations   unlabored.  Symmetrical Cardiomegaly   Findings compatible with atherosclerotic disease of the aorta. Left pleural thickening and likely effusion   The chest does not appear to be significantly changed in the interval               Improved congestion      REPEAT BONE BIOPSY PATH: 10/25/18  Result Information     Status: In process (Resulted: 10/26/2018 09:02)        Assessment:    1. Acute PE provoked by likely malignancy  2. Hypoxic respiratory failure related to above   3. Centrilobular emphysema   4. COPD GOLD Stage 1- not in acute exacerbation  5. 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 Unintentional 17 lb weight loss  7. Cigarette nicotine dependence - quit 4 months ago  8. Hx left VATS with  pleural biopsy 6/12/18,drainage of empyema and  insertion of pleur x, drain                  removed on 7/2/18  9. Hx previous asbestos exposure  10. 10/19/18 BONE BIOPSY  11. Hypercalcemia        Plan:   Monitor levels off oxygen keep >92%  Await second Bone Biopsy result 10/25/18   IV Decadron  4 mg Q6 hrs per oncology. Continue scheduled Bronchodilators - nebulized LABA, ICS BID, Duonebs   CXR 10/24/18  Haldol as needed  K replacement, follow BMP. Fluids per primary  Palliative care following for symptom management and support  Continue Lovenox 1 mg/kg BID for PE  GI prophylaxis-Protonix  This plan of care was reviewed in collaboration with Dr. Nelson Crockett   Electronically signed by SUNSHINE Jurado - MAGAN on 10/26/2018       I personally saw, examined, and cared for the patient. Labs, medications, radiographs reviewed. I agree with history exam and plans detailed in NP note. Anticoagulation for pe with lovenox   Await bm biopsy results     Alise Dakin, M.D.    Pulmonary/Critical Care Medicine

## 2018-10-26 NOTE — PROGRESS NOTES
Differential:    Lab Results   Component Value Date    WBC 9.9 10/26/2018    RBC 3.94 10/26/2018    HGB 9.6 10/26/2018    HCT 31.4 10/26/2018     10/26/2018    MCV 79.7 10/26/2018    MCH 24.4 10/26/2018    MCHC 30.6 10/26/2018    RDW 18.6 10/26/2018    LYMPHOPCT 16.0 10/21/2018    MONOPCT 5.9 10/21/2018    BASOPCT 0.5 10/21/2018    MONOSABS 0.69 10/21/2018    LYMPHSABS 1.86 10/21/2018    EOSABS 0.84 10/21/2018    BASOSABS 0.06 10/21/2018     CMP:    Lab Results   Component Value Date     10/26/2018    K 3.2 10/26/2018    K 4.7 06/07/2018    CL 95 10/26/2018    CO2 32 10/26/2018    BUN 26 10/26/2018    CREATININE 0.7 10/26/2018    GFRAA >60 10/26/2018    LABGLOM >60 10/26/2018    GLUCOSE 93 10/26/2018    PROT 5.7 10/19/2018    LABALBU 1.7 10/19/2018    CALCIUM 10.5 10/26/2018    BILITOT 1.0 10/17/2018    ALKPHOS 454 10/17/2018    AST 37 10/17/2018    ALT 31 10/17/2018     Warfarin PT/INR:    Lab Results   Component Value Date    INR 1.5 10/18/2018    INR 1.4 10/17/2018    INR 1.1 06/07/2018    PROTIME 16.7 (H) 10/18/2018    PROTIME 16.0 (H) 10/17/2018    PROTIME 12.4 06/07/2018       Assessment:    Active Problems:    PVD (peripheral vascular disease) with claudication (HCC)    Hx of endovascular stent graft for abdominal aortic aneurysm    COPD (chronic obstructive pulmonary disease) (HCC)    Acute pulmonary thromboembolism (HCC)    Severe protein-calorie malnutrition (HCC)    Metastatic cancer (HCC)    Hypercalcemia of malignancy    Palliative care encounter  Resolved Problems:    * No resolved hospital problems.  *      Plan:  Replace k cont ivf treatment of hypercalcemia per onc cont anticoagulation for pe await bone marrow results        Flori Luevano  4:01 PM  10/26/2018

## 2018-10-26 NOTE — PROGRESS NOTES
Patient sleeping in bed quietly with HOB elevated. Attempted to administer Kinnie Claus as ordered and wife requested to let patient sleep for now and administer Kinnie Claus later.

## 2018-10-27 LAB
ANION GAP SERPL CALCULATED.3IONS-SCNC: 12 MMOL/L (ref 7–16)
BUN BLDV-MCNC: 23 MG/DL (ref 8–23)
CALCIUM SERPL-MCNC: 9.9 MG/DL (ref 8.6–10.2)
CHLORIDE BLD-SCNC: 98 MMOL/L (ref 98–107)
CO2: 30 MMOL/L (ref 22–29)
CREAT SERPL-MCNC: 0.6 MG/DL (ref 0.7–1.2)
GFR AFRICAN AMERICAN: >60
GFR NON-AFRICAN AMERICAN: >60 ML/MIN/1.73
GLUCOSE BLD-MCNC: 98 MG/DL (ref 74–109)
HCT VFR BLD CALC: 30.3 % (ref 37–54)
HEMOGLOBIN: 9.1 G/DL (ref 12.5–16.5)
MCH RBC QN AUTO: 23.9 PG (ref 26–35)
MCHC RBC AUTO-ENTMCNC: 30 % (ref 32–34.5)
MCV RBC AUTO: 79.7 FL (ref 80–99.9)
PDW BLD-RTO: 18.9 FL (ref 11.5–15)
PLATELET # BLD: 463 E9/L (ref 130–450)
PMV BLD AUTO: 9.8 FL (ref 7–12)
POTASSIUM SERPL-SCNC: 3.7 MMOL/L (ref 3.5–5)
RBC # BLD: 3.8 E12/L (ref 3.8–5.8)
SODIUM BLD-SCNC: 140 MMOL/L (ref 132–146)
WBC # BLD: 13.6 E9/L (ref 4.5–11.5)

## 2018-10-27 PROCEDURE — 6360000002 HC RX W HCPCS: Performed by: INTERNAL MEDICINE

## 2018-10-27 PROCEDURE — 2580000003 HC RX 258: Performed by: INTERNAL MEDICINE

## 2018-10-27 PROCEDURE — 2060000000 HC ICU INTERMEDIATE R&B

## 2018-10-27 PROCEDURE — 85027 COMPLETE CBC AUTOMATED: CPT

## 2018-10-27 PROCEDURE — 80048 BASIC METABOLIC PNL TOTAL CA: CPT

## 2018-10-27 PROCEDURE — 6370000000 HC RX 637 (ALT 250 FOR IP): Performed by: INTERNAL MEDICINE

## 2018-10-27 PROCEDURE — 6370000000 HC RX 637 (ALT 250 FOR IP): Performed by: NURSE PRACTITIONER

## 2018-10-27 PROCEDURE — 36415 COLL VENOUS BLD VENIPUNCTURE: CPT

## 2018-10-27 PROCEDURE — 94640 AIRWAY INHALATION TREATMENT: CPT

## 2018-10-27 RX ADMIN — OXYCODONE AND ACETAMINOPHEN 1 TABLET: 5; 325 TABLET ORAL at 18:15

## 2018-10-27 RX ADMIN — POTASSIUM CHLORIDE AND SODIUM CHLORIDE: 450; 150 INJECTION, SOLUTION INTRAVENOUS at 02:08

## 2018-10-27 RX ADMIN — DEXAMETHASONE SODIUM PHOSPHATE 4 MG: 4 INJECTION, SOLUTION INTRAMUSCULAR; INTRAVENOUS at 08:11

## 2018-10-27 RX ADMIN — GABAPENTIN 300 MG: 300 CAPSULE ORAL at 13:06

## 2018-10-27 RX ADMIN — ENOXAPARIN SODIUM 80 MG: 80 INJECTION, SOLUTION INTRAVENOUS; SUBCUTANEOUS at 08:11

## 2018-10-27 RX ADMIN — ATORVASTATIN CALCIUM 10 MG: 10 TABLET, FILM COATED ORAL at 08:11

## 2018-10-27 RX ADMIN — GABAPENTIN 300 MG: 300 CAPSULE ORAL at 20:01

## 2018-10-27 RX ADMIN — IPRATROPIUM BROMIDE AND ALBUTEROL SULFATE 1 AMPULE: .5; 3 SOLUTION RESPIRATORY (INHALATION) at 13:05

## 2018-10-27 RX ADMIN — BUDESONIDE 500 MCG: 0.5 SUSPENSION RESPIRATORY (INHALATION) at 09:25

## 2018-10-27 RX ADMIN — FORMOTEROL FUMARATE DIHYDRATE 20 MCG: 20 SOLUTION RESPIRATORY (INHALATION) at 21:04

## 2018-10-27 RX ADMIN — LACTULOSE 20 G: 20 SOLUTION ORAL at 20:01

## 2018-10-27 RX ADMIN — LACTULOSE 20 G: 20 SOLUTION ORAL at 08:11

## 2018-10-27 RX ADMIN — ENOXAPARIN SODIUM 80 MG: 80 INJECTION, SOLUTION INTRAVENOUS; SUBCUTANEOUS at 20:02

## 2018-10-27 RX ADMIN — ALPRAZOLAM 0.25 MG: 0.25 TABLET ORAL at 12:05

## 2018-10-27 RX ADMIN — DEXAMETHASONE SODIUM PHOSPHATE 4 MG: 4 INJECTION, SOLUTION INTRAMUSCULAR; INTRAVENOUS at 13:06

## 2018-10-27 RX ADMIN — FORMOTEROL FUMARATE DIHYDRATE 20 MCG: 20 SOLUTION RESPIRATORY (INHALATION) at 09:25

## 2018-10-27 RX ADMIN — SUCRALFATE 1 G: 1 TABLET ORAL at 20:02

## 2018-10-27 RX ADMIN — DEXAMETHASONE SODIUM PHOSPHATE 4 MG: 4 INJECTION, SOLUTION INTRAMUSCULAR; INTRAVENOUS at 20:02

## 2018-10-27 RX ADMIN — Medication 10 ML: at 20:02

## 2018-10-27 RX ADMIN — SUCRALFATE 1 G: 1 TABLET ORAL at 13:06

## 2018-10-27 RX ADMIN — POTASSIUM CHLORIDE AND SODIUM CHLORIDE: 450; 150 INJECTION, SOLUTION INTRAVENOUS at 12:00

## 2018-10-27 RX ADMIN — ASPIRIN 81 MG: 81 TABLET ORAL at 08:10

## 2018-10-27 RX ADMIN — LINACLOTIDE 290 MCG: 145 CAPSULE, GELATIN COATED ORAL at 08:12

## 2018-10-27 RX ADMIN — PANTOPRAZOLE SODIUM 40 MG: 40 TABLET, DELAYED RELEASE ORAL at 08:11

## 2018-10-27 RX ADMIN — BUDESONIDE 500 MCG: 0.5 SUSPENSION RESPIRATORY (INHALATION) at 21:04

## 2018-10-27 RX ADMIN — IPRATROPIUM BROMIDE AND ALBUTEROL SULFATE 1 AMPULE: .5; 3 SOLUTION RESPIRATORY (INHALATION) at 09:25

## 2018-10-27 RX ADMIN — MULTIPLE VITAMINS W/ MINERALS TAB 1 TABLET: TAB at 08:11

## 2018-10-27 RX ADMIN — DEXAMETHASONE SODIUM PHOSPHATE 4 MG: 4 INJECTION, SOLUTION INTRAMUSCULAR; INTRAVENOUS at 00:13

## 2018-10-27 RX ADMIN — OXYCODONE AND ACETAMINOPHEN 1 TABLET: 5; 325 TABLET ORAL at 03:39

## 2018-10-27 RX ADMIN — FERROUS SULFATE TAB 325 MG (65 MG ELEMENTAL FE) 325 MG: 325 (65 FE) TAB at 08:11

## 2018-10-27 RX ADMIN — GABAPENTIN 300 MG: 300 CAPSULE ORAL at 08:11

## 2018-10-27 RX ADMIN — SENNOSIDES AND DOCUSATE SODIUM 2 TABLET: 8.6; 5 TABLET ORAL at 08:11

## 2018-10-27 RX ADMIN — SUCRALFATE 1 G: 1 TABLET ORAL at 05:55

## 2018-10-27 RX ADMIN — Medication 10 ML: at 08:12

## 2018-10-27 ASSESSMENT — PAIN SCALES - GENERAL
PAINLEVEL_OUTOF10: 0
PAINLEVEL_OUTOF10: 0
PAINLEVEL_OUTOF10: 6
PAINLEVEL_OUTOF10: 0
PAINLEVEL_OUTOF10: 0
PAINLEVEL_OUTOF10: 6
PAINLEVEL_OUTOF10: 0

## 2018-10-28 LAB
ANION GAP SERPL CALCULATED.3IONS-SCNC: 10 MMOL/L (ref 7–16)
BUN BLDV-MCNC: 19 MG/DL (ref 8–23)
CALCIUM SERPL-MCNC: 9.2 MG/DL (ref 8.6–10.2)
CHLORIDE BLD-SCNC: 95 MMOL/L (ref 98–107)
CO2: 31 MMOL/L (ref 22–29)
CREAT SERPL-MCNC: 0.7 MG/DL (ref 0.7–1.2)
GFR AFRICAN AMERICAN: >60
GFR NON-AFRICAN AMERICAN: >60 ML/MIN/1.73
GLUCOSE BLD-MCNC: 88 MG/DL (ref 74–109)
HCT VFR BLD CALC: 31.8 % (ref 37–54)
HEMOGLOBIN: 9.4 G/DL (ref 12.5–16.5)
Lab: NORMAL
MCH RBC QN AUTO: 24.2 PG (ref 26–35)
MCHC RBC AUTO-ENTMCNC: 29.6 % (ref 32–34.5)
MCV RBC AUTO: 81.7 FL (ref 80–99.9)
PDW BLD-RTO: 19.2 FL (ref 11.5–15)
PLATELET # BLD: 478 E9/L (ref 130–450)
PMV BLD AUTO: 9.9 FL (ref 7–12)
POTASSIUM SERPL-SCNC: 3.5 MMOL/L (ref 3.5–5)
RBC # BLD: 3.89 E12/L (ref 3.8–5.8)
REPORT: NORMAL
SODIUM BLD-SCNC: 136 MMOL/L (ref 132–146)
THIS TEST SENT TO: NORMAL
WBC # BLD: 14.8 E9/L (ref 4.5–11.5)

## 2018-10-28 PROCEDURE — 6370000000 HC RX 637 (ALT 250 FOR IP): Performed by: INTERNAL MEDICINE

## 2018-10-28 PROCEDURE — 6360000002 HC RX W HCPCS: Performed by: INTERNAL MEDICINE

## 2018-10-28 PROCEDURE — 80048 BASIC METABOLIC PNL TOTAL CA: CPT

## 2018-10-28 PROCEDURE — 94640 AIRWAY INHALATION TREATMENT: CPT

## 2018-10-28 PROCEDURE — 36415 COLL VENOUS BLD VENIPUNCTURE: CPT

## 2018-10-28 PROCEDURE — 2580000003 HC RX 258: Performed by: INTERNAL MEDICINE

## 2018-10-28 PROCEDURE — 85027 COMPLETE CBC AUTOMATED: CPT

## 2018-10-28 PROCEDURE — 2060000000 HC ICU INTERMEDIATE R&B

## 2018-10-28 PROCEDURE — 6370000000 HC RX 637 (ALT 250 FOR IP): Performed by: NURSE PRACTITIONER

## 2018-10-28 RX ORDER — DEXAMETHASONE 4 MG/1
4 TABLET ORAL EVERY 8 HOURS SCHEDULED
Status: DISCONTINUED | OUTPATIENT
Start: 2018-10-28 | End: 2018-10-31

## 2018-10-28 RX ADMIN — DEXAMETHASONE 4 MG: 4 TABLET ORAL at 20:02

## 2018-10-28 RX ADMIN — SENNOSIDES AND DOCUSATE SODIUM 2 TABLET: 8.6; 5 TABLET ORAL at 08:21

## 2018-10-28 RX ADMIN — DEXAMETHASONE SODIUM PHOSPHATE 4 MG: 4 INJECTION, SOLUTION INTRAMUSCULAR; INTRAVENOUS at 08:21

## 2018-10-28 RX ADMIN — FORMOTEROL FUMARATE DIHYDRATE 20 MCG: 20 SOLUTION RESPIRATORY (INHALATION) at 18:18

## 2018-10-28 RX ADMIN — GABAPENTIN 300 MG: 300 CAPSULE ORAL at 14:04

## 2018-10-28 RX ADMIN — ACETAMINOPHEN 650 MG: 325 TABLET, FILM COATED ORAL at 06:31

## 2018-10-28 RX ADMIN — LACTULOSE 20 G: 20 SOLUTION ORAL at 20:02

## 2018-10-28 RX ADMIN — DEXAMETHASONE SODIUM PHOSPHATE 4 MG: 4 INJECTION, SOLUTION INTRAMUSCULAR; INTRAVENOUS at 01:21

## 2018-10-28 RX ADMIN — GABAPENTIN 300 MG: 300 CAPSULE ORAL at 08:20

## 2018-10-28 RX ADMIN — LACTULOSE 20 G: 20 SOLUTION ORAL at 08:23

## 2018-10-28 RX ADMIN — OXYCODONE AND ACETAMINOPHEN 1 TABLET: 5; 325 TABLET ORAL at 01:21

## 2018-10-28 RX ADMIN — ALPRAZOLAM 0.25 MG: 0.25 TABLET ORAL at 01:21

## 2018-10-28 RX ADMIN — IPRATROPIUM BROMIDE AND ALBUTEROL SULFATE 1 AMPULE: .5; 3 SOLUTION RESPIRATORY (INHALATION) at 18:22

## 2018-10-28 RX ADMIN — POTASSIUM CHLORIDE AND SODIUM CHLORIDE: 450; 150 INJECTION, SOLUTION INTRAVENOUS at 08:19

## 2018-10-28 RX ADMIN — DEXAMETHASONE SODIUM PHOSPHATE 4 MG: 4 INJECTION, SOLUTION INTRAMUSCULAR; INTRAVENOUS at 14:04

## 2018-10-28 RX ADMIN — ACETAMINOPHEN 650 MG: 325 TABLET, FILM COATED ORAL at 18:14

## 2018-10-28 RX ADMIN — OXYCODONE AND ACETAMINOPHEN 1 TABLET: 5; 325 TABLET ORAL at 06:29

## 2018-10-28 RX ADMIN — SUCRALFATE 1 G: 1 TABLET ORAL at 05:53

## 2018-10-28 RX ADMIN — ASPIRIN 81 MG: 81 TABLET ORAL at 08:20

## 2018-10-28 RX ADMIN — POTASSIUM CHLORIDE AND SODIUM CHLORIDE: 450; 150 INJECTION, SOLUTION INTRAVENOUS at 20:05

## 2018-10-28 RX ADMIN — GABAPENTIN 300 MG: 300 CAPSULE ORAL at 20:02

## 2018-10-28 RX ADMIN — FERROUS SULFATE TAB 325 MG (65 MG ELEMENTAL FE) 325 MG: 325 (65 FE) TAB at 08:21

## 2018-10-28 RX ADMIN — SUCRALFATE 1 G: 1 TABLET ORAL at 20:02

## 2018-10-28 RX ADMIN — SUCRALFATE 1 G: 1 TABLET ORAL at 14:04

## 2018-10-28 RX ADMIN — ATORVASTATIN CALCIUM 10 MG: 10 TABLET, FILM COATED ORAL at 08:20

## 2018-10-28 RX ADMIN — OXYCODONE AND ACETAMINOPHEN 1 TABLET: 5; 325 TABLET ORAL at 18:14

## 2018-10-28 RX ADMIN — ENOXAPARIN SODIUM 80 MG: 80 INJECTION, SOLUTION INTRAVENOUS; SUBCUTANEOUS at 08:21

## 2018-10-28 RX ADMIN — PANTOPRAZOLE SODIUM 40 MG: 40 TABLET, DELAYED RELEASE ORAL at 08:21

## 2018-10-28 RX ADMIN — BUDESONIDE 500 MCG: 0.5 SUSPENSION RESPIRATORY (INHALATION) at 18:20

## 2018-10-28 RX ADMIN — Medication 10 ML: at 08:21

## 2018-10-28 RX ADMIN — LINACLOTIDE 290 MCG: 145 CAPSULE, GELATIN COATED ORAL at 08:19

## 2018-10-28 RX ADMIN — ALPRAZOLAM 0.25 MG: 0.25 TABLET ORAL at 20:03

## 2018-10-28 RX ADMIN — MULTIPLE VITAMINS W/ MINERALS TAB 1 TABLET: TAB at 08:20

## 2018-10-28 ASSESSMENT — PAIN SCALES - GENERAL
PAINLEVEL_OUTOF10: 7
PAINLEVEL_OUTOF10: 6
PAINLEVEL_OUTOF10: 0
PAINLEVEL_OUTOF10: 5
PAINLEVEL_OUTOF10: 0
PAINLEVEL_OUTOF10: 5

## 2018-10-28 NOTE — PROGRESS NOTES
Lovenox - pulm following  Continue fluids - hyperca resolved  Path still pending of bone marrow  biopsy   Pain meds/ palliation   wants tot be discharged home     Not sure he is safe for home - would consider skilled facility and  palliation     DVT and GERD prophylaxis  All consultants notes reviewed    Rika Guillermo MD  1:55 PM  10/28/2018

## 2018-10-29 LAB
ANION GAP SERPL CALCULATED.3IONS-SCNC: 13 MMOL/L (ref 7–16)
BASOPHILS ABSOLUTE: 0.01 E9/L (ref 0–0.2)
BASOPHILS RELATIVE PERCENT: 0.1 % (ref 0–2)
BUN BLDV-MCNC: 18 MG/DL (ref 8–23)
CALCIUM SERPL-MCNC: 8.9 MG/DL (ref 8.6–10.2)
CHLORIDE BLD-SCNC: 100 MMOL/L (ref 98–107)
CO2: 28 MMOL/L (ref 22–29)
CREAT SERPL-MCNC: 0.7 MG/DL (ref 0.7–1.2)
EOSINOPHILS ABSOLUTE: 0.03 E9/L (ref 0.05–0.5)
EOSINOPHILS RELATIVE PERCENT: 0.2 % (ref 0–6)
GFR AFRICAN AMERICAN: >60
GFR NON-AFRICAN AMERICAN: >60 ML/MIN/1.73
GLUCOSE BLD-MCNC: 97 MG/DL (ref 74–109)
HCT VFR BLD CALC: 33.6 % (ref 37–54)
HEMOGLOBIN: 10 G/DL (ref 12.5–16.5)
IMMATURE GRANULOCYTES #: 0.15 E9/L
IMMATURE GRANULOCYTES %: 1.2 % (ref 0–5)
LYMPHOCYTES ABSOLUTE: 1.71 E9/L (ref 1.5–4)
LYMPHOCYTES RELATIVE PERCENT: 13.9 % (ref 20–42)
MCH RBC QN AUTO: 24.3 PG (ref 26–35)
MCHC RBC AUTO-ENTMCNC: 29.8 % (ref 32–34.5)
MCV RBC AUTO: 81.6 FL (ref 80–99.9)
MONOCYTES ABSOLUTE: 0.84 E9/L (ref 0.1–0.95)
MONOCYTES RELATIVE PERCENT: 6.8 % (ref 2–12)
NEUTROPHILS ABSOLUTE: 9.6 E9/L (ref 1.8–7.3)
NEUTROPHILS RELATIVE PERCENT: 77.8 % (ref 43–80)
PDW BLD-RTO: 19.9 FL (ref 11.5–15)
PLATELET # BLD: 479 E9/L (ref 130–450)
PMV BLD AUTO: 9.8 FL (ref 7–12)
POTASSIUM SERPL-SCNC: 4.2 MMOL/L (ref 3.5–5)
RBC # BLD: 4.12 E12/L (ref 3.8–5.8)
SODIUM BLD-SCNC: 141 MMOL/L (ref 132–146)
WBC # BLD: 12.3 E9/L (ref 4.5–11.5)

## 2018-10-29 PROCEDURE — 6370000000 HC RX 637 (ALT 250 FOR IP): Performed by: INTERNAL MEDICINE

## 2018-10-29 PROCEDURE — 99232 SBSQ HOSP IP/OBS MODERATE 35: CPT | Performed by: EMERGENCY MEDICINE

## 2018-10-29 PROCEDURE — 80048 BASIC METABOLIC PNL TOTAL CA: CPT

## 2018-10-29 PROCEDURE — G8988 SELF CARE GOAL STATUS: HCPCS

## 2018-10-29 PROCEDURE — 2580000003 HC RX 258: Performed by: INTERNAL MEDICINE

## 2018-10-29 PROCEDURE — 6360000002 HC RX W HCPCS: Performed by: INTERNAL MEDICINE

## 2018-10-29 PROCEDURE — G8987 SELF CARE CURRENT STATUS: HCPCS

## 2018-10-29 PROCEDURE — 94640 AIRWAY INHALATION TREATMENT: CPT

## 2018-10-29 PROCEDURE — 97530 THERAPEUTIC ACTIVITIES: CPT

## 2018-10-29 PROCEDURE — 6370000000 HC RX 637 (ALT 250 FOR IP): Performed by: NURSE PRACTITIONER

## 2018-10-29 PROCEDURE — G8979 MOBILITY GOAL STATUS: HCPCS

## 2018-10-29 PROCEDURE — G8978 MOBILITY CURRENT STATUS: HCPCS

## 2018-10-29 PROCEDURE — 2060000000 HC ICU INTERMEDIATE R&B

## 2018-10-29 PROCEDURE — 97161 PT EVAL LOW COMPLEX 20 MIN: CPT

## 2018-10-29 PROCEDURE — 36415 COLL VENOUS BLD VENIPUNCTURE: CPT

## 2018-10-29 PROCEDURE — 85025 COMPLETE CBC W/AUTO DIFF WBC: CPT

## 2018-10-29 PROCEDURE — 97535 SELF CARE MNGMENT TRAINING: CPT

## 2018-10-29 PROCEDURE — 97165 OT EVAL LOW COMPLEX 30 MIN: CPT

## 2018-10-29 RX ADMIN — MULTIPLE VITAMINS W/ MINERALS TAB 1 TABLET: TAB at 08:11

## 2018-10-29 RX ADMIN — SENNOSIDES AND DOCUSATE SODIUM 2 TABLET: 8.6; 5 TABLET ORAL at 08:11

## 2018-10-29 RX ADMIN — OXYCODONE AND ACETAMINOPHEN 1 TABLET: 5; 325 TABLET ORAL at 21:54

## 2018-10-29 RX ADMIN — LINACLOTIDE 290 MCG: 145 CAPSULE, GELATIN COATED ORAL at 08:10

## 2018-10-29 RX ADMIN — GABAPENTIN 300 MG: 300 CAPSULE ORAL at 21:54

## 2018-10-29 RX ADMIN — DEXAMETHASONE 4 MG: 4 TABLET ORAL at 21:55

## 2018-10-29 RX ADMIN — ASPIRIN 81 MG: 81 TABLET ORAL at 08:11

## 2018-10-29 RX ADMIN — MAGNESIUM HYDROXIDE 30 ML: 400 SUSPENSION ORAL at 18:12

## 2018-10-29 RX ADMIN — ATORVASTATIN CALCIUM 10 MG: 10 TABLET, FILM COATED ORAL at 08:11

## 2018-10-29 RX ADMIN — OXYCODONE AND ACETAMINOPHEN 1 TABLET: 5; 325 TABLET ORAL at 08:10

## 2018-10-29 RX ADMIN — IPRATROPIUM BROMIDE AND ALBUTEROL SULFATE 1 AMPULE: .5; 3 SOLUTION RESPIRATORY (INHALATION) at 21:11

## 2018-10-29 RX ADMIN — SUCRALFATE 1 G: 1 TABLET ORAL at 05:21

## 2018-10-29 RX ADMIN — ACETAMINOPHEN 650 MG: 325 TABLET, FILM COATED ORAL at 01:29

## 2018-10-29 RX ADMIN — DOCUSATE SODIUM 100 MG: 100 CAPSULE, LIQUID FILLED ORAL at 18:12

## 2018-10-29 RX ADMIN — DEXAMETHASONE 4 MG: 4 TABLET ORAL at 13:52

## 2018-10-29 RX ADMIN — ENOXAPARIN SODIUM 80 MG: 80 INJECTION, SOLUTION INTRAVENOUS; SUBCUTANEOUS at 21:55

## 2018-10-29 RX ADMIN — PANTOPRAZOLE SODIUM 40 MG: 40 TABLET, DELAYED RELEASE ORAL at 08:11

## 2018-10-29 RX ADMIN — Medication 10 ML: at 08:12

## 2018-10-29 RX ADMIN — FORMOTEROL FUMARATE DIHYDRATE 20 MCG: 20 SOLUTION RESPIRATORY (INHALATION) at 12:18

## 2018-10-29 RX ADMIN — GABAPENTIN 300 MG: 300 CAPSULE ORAL at 13:52

## 2018-10-29 RX ADMIN — FERROUS SULFATE TAB 325 MG (65 MG ELEMENTAL FE) 325 MG: 325 (65 FE) TAB at 08:11

## 2018-10-29 RX ADMIN — BUDESONIDE 500 MCG: 0.5 SUSPENSION RESPIRATORY (INHALATION) at 12:22

## 2018-10-29 RX ADMIN — IPRATROPIUM BROMIDE AND ALBUTEROL SULFATE 1 AMPULE: .5; 3 SOLUTION RESPIRATORY (INHALATION) at 17:01

## 2018-10-29 RX ADMIN — BUDESONIDE 500 MCG: 0.5 SUSPENSION RESPIRATORY (INHALATION) at 21:11

## 2018-10-29 RX ADMIN — DEXAMETHASONE 4 MG: 4 TABLET ORAL at 05:21

## 2018-10-29 RX ADMIN — OXYCODONE AND ACETAMINOPHEN 1 TABLET: 5; 325 TABLET ORAL at 12:13

## 2018-10-29 RX ADMIN — OXYCODONE AND ACETAMINOPHEN 1 TABLET: 5; 325 TABLET ORAL at 01:29

## 2018-10-29 RX ADMIN — IPRATROPIUM BROMIDE AND ALBUTEROL SULFATE 1 AMPULE: .5; 3 SOLUTION RESPIRATORY (INHALATION) at 12:25

## 2018-10-29 RX ADMIN — GABAPENTIN 300 MG: 300 CAPSULE ORAL at 08:11

## 2018-10-29 RX ADMIN — Medication 10 ML: at 21:55

## 2018-10-29 RX ADMIN — ALPRAZOLAM 0.25 MG: 0.25 TABLET ORAL at 08:11

## 2018-10-29 RX ADMIN — FORMOTEROL FUMARATE DIHYDRATE 20 MCG: 20 SOLUTION RESPIRATORY (INHALATION) at 21:11

## 2018-10-29 RX ADMIN — ENOXAPARIN SODIUM 80 MG: 80 INJECTION, SOLUTION INTRAVENOUS; SUBCUTANEOUS at 08:11

## 2018-10-29 ASSESSMENT — PAIN SCALES - GENERAL
PAINLEVEL_OUTOF10: 0
PAINLEVEL_OUTOF10: 9
PAINLEVEL_OUTOF10: 0
PAINLEVEL_OUTOF10: 0
PAINLEVEL_OUTOF10: 6
PAINLEVEL_OUTOF10: 7
PAINLEVEL_OUTOF10: 7
PAINLEVEL_OUTOF10: 1
PAINLEVEL_OUTOF10: 0

## 2018-10-29 ASSESSMENT — PAIN DESCRIPTION - PROGRESSION: CLINICAL_PROGRESSION: NOT CHANGED

## 2018-10-29 ASSESSMENT — PAIN DESCRIPTION - FREQUENCY: FREQUENCY: CONTINUOUS

## 2018-10-29 ASSESSMENT — PAIN DESCRIPTION - LOCATION: LOCATION: FLANK

## 2018-10-29 ASSESSMENT — PAIN DESCRIPTION - DESCRIPTORS: DESCRIPTORS: ACHING;CONSTANT;DISCOMFORT

## 2018-10-29 ASSESSMENT — PAIN DESCRIPTION - ORIENTATION: ORIENTATION: LEFT

## 2018-10-29 ASSESSMENT — PAIN DESCRIPTION - PAIN TYPE: TYPE: ACUTE PAIN

## 2018-10-29 ASSESSMENT — PAIN DESCRIPTION - ONSET: ONSET: GRADUAL

## 2018-10-29 NOTE — PLAN OF CARE
Problem: Nutrition  Goal: Optimal nutrition therapy  Outcome: Ongoing  Nutrition Problem: Severe malnutrition, in context of chronic illness  Intervention: Food and/or Nutrient Delivery: Continue current diet, Discontinue ONS  Nutritional Goals: Consume > 75% of most meals.

## 2018-10-29 NOTE — PROGRESS NOTES
Occupational Therapy  OCCUPATIONAL THERAPY INITIAL EVALUATION      Date:10/29/2018  Patient Name: Frederick Zhong  MRN: [id number]  : 1940  Room: [address] [place], [place] [address]     Evaluating OT: Corrie Rivera AM-PAC Daily Activity Raw Score:    G-Code: VIOLETA  Recommended Adaptive Equipment: AD per PT rec     Diagnosis: Acute Pulmonary Thromboembolism    Past Medical History: VATS 17  Precautions: Falls, [place], Language, sitter     Home Living: Pt lives With wife in a 2 story hosue with  stairs to enter and 1 rails; bed/bath on 2nd floor; pt states he can live on 1st floor if he has to and they may put in stair lift to 2nd floor  Bathroom setup: Tub-Shower combo  Equipment owned: tub bench     Prior Level of Function: IND with ADLs IND with IADLs; using no AD for ambulation. Pt recently visting [facility] for a couple months  +light homemaking  Occupation: retired     Pain Level: pt c/o significant pain on L side but states he just received pain pill this session     Cognition: A&O: 1-2?/3 (pt oriented to person, place with cues but confused to time; Follows simple 1 step directions. Intermittent confusion and language barrier.  Pt able to understand basic english    Memory: F   Sequencing: F-   Problem solving: F-   Judgement/safety: F-    Functional Assessment:   Initial Eval Status  Date: 10/29 Treatment Status  Date: Short Term Goals  Treatment frequency: 1-3x/week on MICU; PRN on stepdown unit   Feeding IND     Grooming CGA standing at sink   Pt will improve grooming/hygiene tasks to Mod I while Standing at sink     UB Dressing Supervision after setup  Pt will improve UB dressing to Mod I with item retrieval    LB Dressing Min A for overall task  SBA to don/doff sock seated   Pt will improve LB dressing to Mod I with AE PRN   Bathing Min A     Toileting MiN A  Pt will improve toileting task and all clothing mgmt to Mod I   Bed Mobility  Supine to sit: NT  Sit to supine: NT     Functional Transfers Sit to

## 2018-10-29 NOTE — PROGRESS NOTES
tablet, Oral, Daily, Navin P Popovec, DO, 1 tablet at 10/29/18 0811    pantoprazole (PROTONIX) tablet 40 mg, 40 mg, Oral, Daily, Navin P Popovec, DO, 40 mg at 10/29/18 0225    sodium chloride flush 0.9 % injection 10 mL, 10 mL, Intravenous, 2 times per day, Navin P Popovec, DO, 10 mL at 10/29/18 6544    sodium chloride flush 0.9 % injection 10 mL, 10 mL, Intravenous, PRN, Navin P Popovec, DO, 10 mL at 10/26/18 1517    magnesium hydroxide (MILK OF MAGNESIA) 400 MG/5ML suspension 30 mL, 30 mL, Oral, Daily PRN, Navin P Popovec, DO, 30 mL at 10/20/18 0945    ondansetron (ZOFRAN) injection 4 mg, 4 mg, Intravenous, Q6H PRN, Navin COX Popovec, DO    perflutren lipid microspheres (DEFINITY) injection 1.65 mg, 1.5 mL, Intravenous, ONCE PRN, Rozina Gautam, APRN    Assessment:    Active Problems:    PVD (peripheral vascular disease) with claudication (HCC)    Hx of endovascular stent graft for abdominal aortic aneurysm    COPD (chronic obstructive pulmonary disease) (HCC)    Acute pulmonary thromboembolism ([address]    Severe protein-calorie malnutrition ([address]    Metastatic cancer ([address]    Hypercalcemia of malignancy    Palliative care encounter  Resolved Problems:    * No resolved hospital problems. *    1. Lytic lesions involving skull. 2.  Pleural thickening  3. Hx left VATS with pleural biopsy on 6/11/18, pathology revealed acute and chronic fibrosing pleuritis   4. Acute small PE  5. Hx of tobacco abuse  6. COPD   7. Hx of asbestosis exposure  8. YANETH positive. 9.  Hypercalcemia, improving. Plan:c.    Bone marrow biopsy performed , will await results of flow and cytogenitics   No evidence of plasma cell dyscrasia, no monoclonality on immunofixation.      Plan for follow up with Dr Ammy Caballero at [facility] after discharge      Electronically signed by Bebeto Bernal MD on 10/29/2018 at 10:26 AM

## 2018-10-29 NOTE — PROGRESS NOTES
oncology. Continue scheduled Bronchodilators - nebulized LABA, ICS BID, Duonebs   Follow CXR   K replacement, follow BMP  Palliative care following for symptom management and support  Continue Lovenox 1 mg/kg BID for PE  GI prophylaxis-Protonix  Nutritional support  This plan of care was reviewed in collaboration with Dr. Renee Cruz   Electronically signed by SUNSHINE Avery CNP on 10/29/2018 at 11:50 AM    I personally saw, examined, and cared for the patient. Labs, medications, radiographs reviewed. I agree with history exam and plans detailed in NP note. Anticoagulation   Await results of bone marrow   Gerald Cm M.D.    Pulmonary/Critical Care Medicine

## 2018-10-29 NOTE — PROGRESS NOTES
Friday is negative. Continue to wait for bone marrow biopsy from last Thursday. He is a DNR-CCA. Oncology is following and will follow on the outpatient. He did have an anion gap acidosis, but this has resolved. His calcium remains elevated and Oncology is following. No change in medications at this time. He is also eating better per nursing. He will probably need rehab on discharge. Will eventually need a longer acting opiate at discharge. Currently taking 4 Percocet 5-325/24 hrs = 30 OME.                 - Goals of care: continue current management and to be determined.                  - Code Status: DNR-CCA                 - Symptom Management:     Symptom Medications Number Doses in Past 24 hrs   Pain Tyleonl 650 mg PO Q 4 PRN  Percocet 5-325 mg Q 4 PRN  MS IR 7.5 mg Q 4 PRN d/c'd  MS IR 15 mg Q 4 PRN d/c'd  Gabapentin 300 mg TID  Lidoderm patch  ASA 81 mg Daily  Decadron 4mg Q8H 3  4  0  0  3  1 patch on  1  3   Nausea/vomiting Zofran 4 mg IV Q 6 PRN 0   Bowel Regime/constipation Lactulose 20 g BID - D/Nomi  Linzess 290 mcg Daily  Colace 100 mg BID PRN  MOM daily PRN  Dulcolax Supp 10 mg Daily PRN  Senna-S 2 tabs daily 0  1  0  0  0  1   Anxiety/agitation/depression Xanax 0.25 PO BID PRN  Haldol 5 mg 3  0   SOB Striverdi Respimat 2 puff daily  Spiriva 18 mcg daily inhaler 1  1   Appetite       Sleep         Current Medications:  Inpatient medications reviewed: yes  Home Medications reviewed: yes    Time/Communication  Time In: 1020  Time Out: 1045    Greater than 51% of time spent, total 25 minutes in counseling and coordination of care at the bedside regarding goals ofcare, symptom management, diagnosis and prognosis and see above. Discharge planning: to be determined    Referrals to: none today    Discussed patient and the plan of care with the other IDT members of Palliative Care, and with consultants, patient, family and floor nurse.     Subjective:     Subjective/Events  Patient is much less confused today. He is now responding much better to verbal stimuli. He was up and ambulating with a walker prior to my arrival to his room. He complains of pain in his \"bones\" and rates it a 3/10. Patient is denying any headache, abdominal pain, chest pain, or increasing shortness of breath. He now is denying complaints of  lower extremity discomfort. Nursing reported that he is eating better and more communicative. The patient continues to remain anxious about his bone marrow biopsy results and would like to know his results soon. Wife not present in the room. Goals of care: continue current management and to be determined  Advance Directives:  DNR-CCA - changed by Dr. Yarelis Lombardi.   Surrogate: Spouse  Prognosis: unknown  Spiritual assessment: No spiritual distress identified   Bereavement and grief: to be determined    Past Medical History:   Diagnosis Date    Bilateral carotid bruits 12/2/2017    Enlarged prostate     Femoral-popliteal atherosclerosis ([address] 12/2/2017    [place] (hard of hearing)     Hx of endovascular stent graft for abdominal aortic aneurysm 12/2/2017    Hyperlipidemia     Pneumonia 05/2018    flu hospitalized 7 [address] marie    Tobacco dependence 12/2/2017       Past Surgical History:   Procedure Laterality Date    ABDOMINAL AORTIC ANEURYSM REPAIR  12/11/2008    Zenith, Delatore    APPENDECTOMY      COLONOSCOPY      EYE SURGERY      imelda lense implants    OTHER SURGICAL HISTORY  3-    RLE lysis catheter    OTHER SURGICAL HISTORY  3-    Dr. Patricia Mcclain- Angioplasty with stent to right common illiac artery    MT THORSC DX LUNGS/PERICAR/MED/PLEURAL SPACE W/O BX Left 6/11/2018    BRONCH, LEFT VIDEO ASSISTED THORACOSCOPY WITH DECORTICATION performed by Stefan Harris MD at [facility] TURP  01/10/2018    cysto retro pyelo for bph       Family History   Problem Relation Age of Onset    Stroke Mother     Heart Attack Father     Heart Failure Brother     Cancer Brother     Emphysema Brother     Heart Disease Brother     Diabetes Brother        Unable to obtain family history due to altered mental status. No Known Allergies    Review of Systems:   See palliative care ROS/ESAS below; Detail ROS unable to obtain due to patient's mental status. Social history:  Veteranstatus: no  Marital status:   Living status: with family:  spouse   Work history: . Family Meeting:  Participants:Nofamily meeting held today --   Awaiting bone marrow biopsy. Family meeting was held to discuss:  N/A. Objective:     Physical Exam  /78   Pulse 98   Temp 97.9 °F (36.6 °C) (Temporal)   Resp 18   Ht 5' 6\" (1.676 m)   Wt 159 lb 4 oz (72.2 kg)   SpO2 98%   BMI 25.70 kg/m²     Physical Exam   Constitutional: He is oriented to person, place, and time. No distress. Chronically ill appearing, less confused, and easier to re-direct this morning. Sitter remains in his room. HENT:   Head: Normocephalic and atraumatic. Right Ear: External ear normal.   Left Ear: External ear normal.   Nose: Nose normal.   Mouth/Throat: Oropharynx is clear and moist. No oropharyngeal exudate. Eyes: Conjunctivae and EOM are normal. Right eye exhibits no discharge. Left eye exhibits no discharge. No scleral icterus. Pupils are 3 mm and reactive bilaterally. Neck: Normal range of motion. Neck supple. No tracheal deviation present. No thyromegaly present. Cardiovascular: Normal rate, regular rhythm, normal heart sounds and intact distal pulses. No murmur heard. Pulmonary/Chest: No stridor. He is in respiratory distress (mild). He has wheezes (few expiratory). He has rales (left basilar). He exhibits no tenderness. Decreased breath sounds on the left. Abdominal: Soft. Bowel sounds are normal. He exhibits no distension. There is no tenderness. There is no rebound and no guarding. Musculoskeletal: Normal range of motion. He exhibits no edema, tenderness or deformity.    No

## 2018-10-30 LAB
ANION GAP SERPL CALCULATED.3IONS-SCNC: 11 MMOL/L (ref 7–16)
BUN BLDV-MCNC: 23 MG/DL (ref 8–23)
CALCIUM SERPL-MCNC: 9 MG/DL (ref 8.6–10.2)
CHLORIDE BLD-SCNC: 97 MMOL/L (ref 98–107)
CO2: 30 MMOL/L (ref 22–29)
CREAT SERPL-MCNC: 0.8 MG/DL (ref 0.7–1.2)
GFR AFRICAN AMERICAN: >60
GFR NON-AFRICAN AMERICAN: >60 ML/MIN/1.73
GLUCOSE BLD-MCNC: 111 MG/DL (ref 74–109)
HCT VFR BLD CALC: 32.5 % (ref 37–54)
HEMOGLOBIN: 9.6 G/DL (ref 12.5–16.5)
MCH RBC QN AUTO: 24.1 PG (ref 26–35)
MCHC RBC AUTO-ENTMCNC: 29.5 % (ref 32–34.5)
MCV RBC AUTO: 81.5 FL (ref 80–99.9)
PDW BLD-RTO: 19.9 FL (ref 11.5–15)
PLATELET # BLD: 484 E9/L (ref 130–450)
PMV BLD AUTO: 9.9 FL (ref 7–12)
POTASSIUM SERPL-SCNC: 4.3 MMOL/L (ref 3.5–5)
RBC # BLD: 3.99 E12/L (ref 3.8–5.8)
SODIUM BLD-SCNC: 138 MMOL/L (ref 132–146)
WBC # BLD: 14.2 E9/L (ref 4.5–11.5)

## 2018-10-30 PROCEDURE — 36415 COLL VENOUS BLD VENIPUNCTURE: CPT

## 2018-10-30 PROCEDURE — 6360000002 HC RX W HCPCS: Performed by: INTERNAL MEDICINE

## 2018-10-30 PROCEDURE — 6370000000 HC RX 637 (ALT 250 FOR IP): Performed by: INTERNAL MEDICINE

## 2018-10-30 PROCEDURE — 94640 AIRWAY INHALATION TREATMENT: CPT

## 2018-10-30 PROCEDURE — 85027 COMPLETE CBC AUTOMATED: CPT

## 2018-10-30 PROCEDURE — 6370000000 HC RX 637 (ALT 250 FOR IP): Performed by: NURSE PRACTITIONER

## 2018-10-30 PROCEDURE — 80048 BASIC METABOLIC PNL TOTAL CA: CPT

## 2018-10-30 PROCEDURE — 87385 HISTOPLASMA CAPSUL AG IA: CPT

## 2018-10-30 PROCEDURE — 2580000003 HC RX 258: Performed by: INTERNAL MEDICINE

## 2018-10-30 PROCEDURE — 2060000000 HC ICU INTERMEDIATE R&B

## 2018-10-30 RX ORDER — DEXAMETHASONE 4 MG/1
4 TABLET ORAL 2 TIMES DAILY WITH MEALS
Qty: 20 TABLET | Refills: 0 | Status: SHIPPED | OUTPATIENT
Start: 2018-10-30 | End: 2018-11-09

## 2018-10-30 RX ORDER — OXYCODONE HYDROCHLORIDE AND ACETAMINOPHEN 5; 325 MG/1; MG/1
1 TABLET ORAL EVERY 6 HOURS PRN
Qty: 28 TABLET | Refills: 0 | Status: SHIPPED | OUTPATIENT
Start: 2018-10-30 | End: 2018-11-06

## 2018-10-30 RX ORDER — FERROUS SULFATE 325(65) MG
325 TABLET ORAL
Qty: 30 TABLET | Refills: 3 | COMMUNITY
Start: 2018-10-30

## 2018-10-30 RX ADMIN — MAGNESIUM HYDROXIDE 30 ML: 400 SUSPENSION ORAL at 18:36

## 2018-10-30 RX ADMIN — GABAPENTIN 300 MG: 300 CAPSULE ORAL at 13:23

## 2018-10-30 RX ADMIN — ALPRAZOLAM 0.25 MG: 0.25 TABLET ORAL at 15:32

## 2018-10-30 RX ADMIN — MULTIPLE VITAMINS W/ MINERALS TAB 1 TABLET: TAB at 07:47

## 2018-10-30 RX ADMIN — OXYCODONE AND ACETAMINOPHEN 1 TABLET: 5; 325 TABLET ORAL at 02:04

## 2018-10-30 RX ADMIN — ACETAMINOPHEN 650 MG: 325 TABLET, FILM COATED ORAL at 18:31

## 2018-10-30 RX ADMIN — ACETAMINOPHEN 650 MG: 325 TABLET, FILM COATED ORAL at 12:27

## 2018-10-30 RX ADMIN — SENNOSIDES AND DOCUSATE SODIUM 2 TABLET: 8.6; 5 TABLET ORAL at 07:47

## 2018-10-30 RX ADMIN — PANTOPRAZOLE SODIUM 40 MG: 40 TABLET, DELAYED RELEASE ORAL at 07:47

## 2018-10-30 RX ADMIN — BISACODYL 10 MG: 10 SUPPOSITORY RECTAL at 06:11

## 2018-10-30 RX ADMIN — ENOXAPARIN SODIUM 80 MG: 80 INJECTION, SOLUTION INTRAVENOUS; SUBCUTANEOUS at 22:31

## 2018-10-30 RX ADMIN — OXYCODONE AND ACETAMINOPHEN 1 TABLET: 5; 325 TABLET ORAL at 06:04

## 2018-10-30 RX ADMIN — GABAPENTIN 300 MG: 300 CAPSULE ORAL at 07:47

## 2018-10-30 RX ADMIN — ASPIRIN 81 MG: 81 TABLET ORAL at 07:47

## 2018-10-30 RX ADMIN — OXYCODONE AND ACETAMINOPHEN 1 TABLET: 5; 325 TABLET ORAL at 12:27

## 2018-10-30 RX ADMIN — DEXAMETHASONE 4 MG: 4 TABLET ORAL at 22:31

## 2018-10-30 RX ADMIN — FORMOTEROL FUMARATE DIHYDRATE 20 MCG: 20 SOLUTION RESPIRATORY (INHALATION) at 09:19

## 2018-10-30 RX ADMIN — DOCUSATE SODIUM 100 MG: 100 CAPSULE, LIQUID FILLED ORAL at 02:07

## 2018-10-30 RX ADMIN — ENOXAPARIN SODIUM 80 MG: 80 INJECTION, SOLUTION INTRAVENOUS; SUBCUTANEOUS at 07:50

## 2018-10-30 RX ADMIN — DOCUSATE SODIUM 100 MG: 100 CAPSULE, LIQUID FILLED ORAL at 18:36

## 2018-10-30 RX ADMIN — Medication 10 ML: at 22:31

## 2018-10-30 RX ADMIN — OXYCODONE AND ACETAMINOPHEN 1 TABLET: 5; 325 TABLET ORAL at 18:31

## 2018-10-30 RX ADMIN — Medication 10 ML: at 07:47

## 2018-10-30 RX ADMIN — GABAPENTIN 300 MG: 300 CAPSULE ORAL at 22:31

## 2018-10-30 RX ADMIN — BUDESONIDE 500 MCG: 0.5 SUSPENSION RESPIRATORY (INHALATION) at 09:27

## 2018-10-30 RX ADMIN — FERROUS SULFATE TAB 325 MG (65 MG ELEMENTAL FE) 325 MG: 325 (65 FE) TAB at 07:47

## 2018-10-30 RX ADMIN — OXYCODONE AND ACETAMINOPHEN 1 TABLET: 5; 325 TABLET ORAL at 22:31

## 2018-10-30 RX ADMIN — DEXAMETHASONE 4 MG: 4 TABLET ORAL at 06:03

## 2018-10-30 RX ADMIN — LINACLOTIDE 290 MCG: 145 CAPSULE, GELATIN COATED ORAL at 07:47

## 2018-10-30 RX ADMIN — ATORVASTATIN CALCIUM 10 MG: 10 TABLET, FILM COATED ORAL at 07:47

## 2018-10-30 RX ADMIN — DEXAMETHASONE 4 MG: 4 TABLET ORAL at 13:23

## 2018-10-30 ASSESSMENT — PAIN DESCRIPTION - LOCATION
LOCATION: FLANK

## 2018-10-30 ASSESSMENT — PAIN DESCRIPTION - PAIN TYPE
TYPE: ACUTE PAIN

## 2018-10-30 ASSESSMENT — PAIN DESCRIPTION - ORIENTATION
ORIENTATION: LEFT

## 2018-10-30 ASSESSMENT — PAIN DESCRIPTION - DESCRIPTORS
DESCRIPTORS: ACHING;CONSTANT;DISCOMFORT
DESCRIPTORS: ACHING;CONSTANT;DISCOMFORT
DESCRIPTORS: ACHING;DISCOMFORT

## 2018-10-30 ASSESSMENT — PAIN SCALES - GENERAL
PAINLEVEL_OUTOF10: 7
PAINLEVEL_OUTOF10: 0
PAINLEVEL_OUTOF10: 6
PAINLEVEL_OUTOF10: 8
PAINLEVEL_OUTOF10: 0
PAINLEVEL_OUTOF10: 6
PAINLEVEL_OUTOF10: 8

## 2018-10-30 ASSESSMENT — PAIN DESCRIPTION - ONSET
ONSET: ON-GOING
ONSET: GRADUAL
ONSET: GRADUAL

## 2018-10-30 ASSESSMENT — PAIN DESCRIPTION - FREQUENCY
FREQUENCY: CONTINUOUS

## 2018-10-30 ASSESSMENT — PAIN DESCRIPTION - PROGRESSION
CLINICAL_PROGRESSION: NOT CHANGED
CLINICAL_PROGRESSION: NOT CHANGED

## 2018-10-30 NOTE — PROGRESS NOTES
Subjective:    Sig improvement in mental status   awake and alert   responds to questions     Objective:    BP 94/64   Pulse 94   Temp 98.2 °F (36.8 °C) (Temporal)   Resp 16   Ht 5' 6\" (1.676 m)   Wt 159 lb 4 oz (72.2 kg)   SpO2 96%   BMI 25.70 kg/m²     General: NAD  HEENT: No thrush or mucositis, EOMI, PERRLA  Heart:  RRR, no murmurs, gallops, or rubs.   Lungs:  CTA bilaterally, no wheeze, rales or rhonchi  Abd: bowel sounds present, nontender, nondistended, no masses  Extrem:  No clubbing, cyanosis, or edema  Lymphatics: No palpable adenopathy in cervical and supraclavicular regions  Skin: Intact, no petechia or purpura    CBC with Differential:    Lab Results   Component Value Date    WBC 14.2 10/30/2018    RBC 3.99 10/30/2018    HGB 9.6 10/30/2018    HCT 32.5 10/30/2018     10/30/2018    MCV 81.5 10/30/2018    MCH 24.1 10/30/2018    MCHC 29.5 10/30/2018    RDW 19.9 10/30/2018    LYMPHOPCT 13.9 10/29/2018    MONOPCT 6.8 10/29/2018    BASOPCT 0.1 10/29/2018    MONOSABS 0.84 10/29/2018    LYMPHSABS 1.71 10/29/2018    EOSABS 0.03 10/29/2018    BASOSABS 0.01 10/29/2018     CMP:    Lab Results   Component Value Date     10/30/2018    K 4.3 10/30/2018    K 4.7 06/07/2018    CL 97 10/30/2018    CO2 30 10/30/2018    BUN 23 10/30/2018    CREATININE 0.8 10/30/2018    GFRAA >60 10/30/2018    LABGLOM >60 10/30/2018    GLUCOSE 111 10/30/2018    PROT 5.7 10/19/2018    LABALBU 1.7 10/19/2018    CALCIUM 9.0 10/30/2018    BILITOT 1.0 10/17/2018    ALKPHOS 454 10/17/2018    AST 37 10/17/2018    ALT 31 10/17/2018      [id number]}     Current Facility-Administered Medications:     dexamethasone (DECADRON) tablet 4 mg, 4 mg, Oral, 3 times per day, Tomer Wooten MD, 4 mg at 10/30/18 0603    potassium bicarbonate (K-LYTE) disintegrating tablet 50 mEq, 50 mEq, Oral, Once, Fabricio Navneeting DO Boubacar    enoxaparin (LOVENOX) injection 80 mg, 1 mg/kg, Subcutaneous, BID, Serena Cuevas MD, 80 mg at 10/30/18

## 2018-10-30 NOTE — PROGRESS NOTES
[facility]    Subjective:  Pt seen this am wife at bedside      Current Facility-Administered Medications:     dexamethasone (DECADRON) tablet 4 mg, 4 mg, Oral, 3 times per day, Sctoty Paul MD, 4 mg at 10/30/18 1323    potassium bicarbonate (K-LYTE) disintegrating tablet 50 mEq, 50 mEq, Oral, Once, Gretel Hamlin DO    enoxaparin (LOVENOX) injection 80 mg, 1 mg/kg, Subcutaneous, BID, Sherlyn Rajput MD, 80 mg at 10/30/18 0750    oxyCODONE-acetaminophen (PERCOCET) 5-325 MG per tablet 1 tablet, 1 tablet, Oral, Q4H PRN, Gretel Hamlin DO, 1 tablet at 10/30/18 1227    formoterol (PERFOROMIST) nebulizer solution 20 mcg, 20 mcg, Nebulization, BID, Sherlyn Rajput MD, 20 mcg at 10/30/18 0919    budesonide (PULMICORT) nebulizer suspension 500 mcg, 500 mcg, Nebulization, BID, Sherlyn Rajput MD, 500 mcg at 10/30/18 7748    ipratropium-albuterol (DUONEB) nebulizer solution 1 ampule, 1 ampule, Inhalation, Q4H While awake, Sherlyn Rajput MD, 1 ampule at 10/29/18 2111    ALPRAZolam [place] [employer]) tablet 0.25 mg, 0.25 mg, Oral, BID PRN, SUNSHINE Crews - CNP, 0.25 mg at 10/30/18 1532    sennosides-docusate sodium (SENOKOT-S) 8.6-50 MG tablet 2 tablet, 2 tablet, Oral, Daily, SUNSHINE Crews - CNP, 2 tablet at 10/30/18 0747    mineral oil-hydrophilic petrolatum (HYDROPHOR) ointment, , Topical, BID PRN, Navin Grande DO    ferrous sulfate tablet 325 mg, 325 mg, Oral, Daily with breakfast, Jonathan Escamilla MD, 325 mg at 10/30/18 0747    bisacodyl (DULCOLAX) suppository 10 mg, 10 mg, Rectal, Daily PRN, Navin Grande DO, 10 mg at 10/30/18 0611    lidocaine (LIDODERM) 5 % 1 patch, 1 patch, Transdermal, Daily, Navin Grande DO, 1 patch at 10/30/18 0747    acetaminophen (TYLENOL) tablet 650 mg, 650 mg, Oral, Q4H PRN, Navin Grande DO, 650 mg at 10/30/18 1227    aspirin EC tablet 81 mg, 81 mg, Oral, Daily, Navin Grande DO, 81 mg at 10/30/18 0747    atorvastatin (LIPITOR) LYMPHSABS 1.71 10/29/2018    EOSABS 0.03 10/29/2018    BASOSABS 0.01 10/29/2018     CMP:    Lab Results   Component Value Date     10/30/2018    K 4.3 10/30/2018    K 4.7 06/07/2018    CL 97 10/30/2018    CO2 30 10/30/2018    BUN 23 10/30/2018    CREATININE 0.8 10/30/2018    GFRAA >60 10/30/2018    LABGLOM >60 10/30/2018    GLUCOSE 111 10/30/2018    PROT 5.7 10/19/2018    LABALBU 1.7 10/19/2018    CALCIUM 9.0 10/30/2018    BILITOT 1.0 10/17/2018    ALKPHOS 454 10/17/2018    AST 37 10/17/2018    ALT 31 10/17/2018     Warfarin PT/INR:    Lab Results   Component Value Date    INR 1.5 10/18/2018    INR 1.4 10/17/2018    INR 1.1 06/07/2018    PROTIME 16.7 (H) 10/18/2018    PROTIME 16.0 (H) 10/17/2018    PROTIME 12.4 06/07/2018       Assessment:    Active Problems:    PVD (peripheral vascular disease) with claudication (HCC)    Hx of endovascular stent graft for abdominal aortic aneurysm    COPD (chronic obstructive pulmonary disease) (HCC)    Acute pulmonary thromboembolism (HCC)    Severe protein-calorie malnutrition (HCC)    Metastatic cancer (HCC)    Hypercalcemia of malignancy    Palliative care encounter  Resolved Problems:    * No resolved hospital problems.  *      Plan:  Dc home outpt f/u will need serial lab and close f/u        Francoise Lora  5:26 PM  10/30/2018

## 2018-10-30 NOTE — PROGRESS NOTES
oxygen keep >92%  Decreased decadron 4 mg po Q 8 hrs per oncology. Continue scheduled Bronchodilators - nebulized LABA, ICS BID, Duonebs   Follow CXR   K replacement, follow BMP  Palliative care following for symptom management and support  Continue Lovenox 1 mg/kg BID for PE  GI prophylaxis-Protonix  Nutritional support  Oncology input noted regarding bone marrow biopsy  Await bone biopsy results -pending  This plan of care was reviewed in collaboration with Dr. Richard Edwards   Electronically signed by SUNSHINE Madison CNP on 10/30/2018 at 12:07 PM    I personally saw, examined, and cared for the patient. Labs, medications, radiographs reviewed. I agree with history exam and plans detailed in NP note. Anticoagulation   Pt/ot   Joan Alonso M.D.    Pulmonary/Critical Care Medicine

## 2018-10-31 VITALS
HEART RATE: 95 BPM | HEIGHT: 66 IN | DIASTOLIC BLOOD PRESSURE: 73 MMHG | WEIGHT: 159.25 LBS | OXYGEN SATURATION: 99 % | RESPIRATION RATE: 18 BRPM | TEMPERATURE: 98.1 F | SYSTOLIC BLOOD PRESSURE: 117 MMHG | BODY MASS INDEX: 25.59 KG/M2

## 2018-10-31 LAB
ANION GAP SERPL CALCULATED.3IONS-SCNC: 14 MMOL/L (ref 7–16)
BUN BLDV-MCNC: 20 MG/DL (ref 8–23)
CALCIUM SERPL-MCNC: 8.6 MG/DL (ref 8.6–10.2)
CHLORIDE BLD-SCNC: 95 MMOL/L (ref 98–107)
CO2: 30 MMOL/L (ref 22–29)
CREAT SERPL-MCNC: 0.7 MG/DL (ref 0.7–1.2)
GFR AFRICAN AMERICAN: >60
GFR NON-AFRICAN AMERICAN: >60 ML/MIN/1.73
GLUCOSE BLD-MCNC: 96 MG/DL (ref 74–109)
HCT VFR BLD CALC: 33.2 % (ref 37–54)
HEMOGLOBIN: 9.9 G/DL (ref 12.5–16.5)
Lab: NORMAL
MCH RBC QN AUTO: 24.4 PG (ref 26–35)
MCHC RBC AUTO-ENTMCNC: 29.8 % (ref 32–34.5)
MCV RBC AUTO: 82 FL (ref 80–99.9)
PDW BLD-RTO: 19.9 FL (ref 11.5–15)
PLATELET # BLD: 490 E9/L (ref 130–450)
PMV BLD AUTO: 10.1 FL (ref 7–12)
POTASSIUM SERPL-SCNC: 4.3 MMOL/L (ref 3.5–5)
RBC # BLD: 4.05 E12/L (ref 3.8–5.8)
REPORT: NORMAL
SODIUM BLD-SCNC: 139 MMOL/L (ref 132–146)
THIS TEST SENT TO: NORMAL
WBC # BLD: 15 E9/L (ref 4.5–11.5)

## 2018-10-31 PROCEDURE — 6370000000 HC RX 637 (ALT 250 FOR IP): Performed by: INTERNAL MEDICINE

## 2018-10-31 PROCEDURE — 6360000002 HC RX W HCPCS: Performed by: INTERNAL MEDICINE

## 2018-10-31 PROCEDURE — 6370000000 HC RX 637 (ALT 250 FOR IP): Performed by: NURSE PRACTITIONER

## 2018-10-31 PROCEDURE — 80048 BASIC METABOLIC PNL TOTAL CA: CPT

## 2018-10-31 PROCEDURE — 85027 COMPLETE CBC AUTOMATED: CPT

## 2018-10-31 PROCEDURE — 2580000003 HC RX 258: Performed by: INTERNAL MEDICINE

## 2018-10-31 PROCEDURE — 36415 COLL VENOUS BLD VENIPUNCTURE: CPT

## 2018-10-31 PROCEDURE — 94640 AIRWAY INHALATION TREATMENT: CPT

## 2018-10-31 RX ORDER — ALPRAZOLAM 0.25 MG/1
0.25 TABLET ORAL 2 TIMES DAILY PRN
Qty: 20 TABLET | Refills: 0 | Status: SHIPPED | OUTPATIENT
Start: 2018-10-31 | End: 2018-11-30

## 2018-10-31 RX ORDER — DEXAMETHASONE 4 MG/1
4 TABLET ORAL EVERY 12 HOURS SCHEDULED
Status: DISCONTINUED | OUTPATIENT
Start: 2018-10-31 | End: 2018-10-31 | Stop reason: HOSPADM

## 2018-10-31 RX ADMIN — ENOXAPARIN SODIUM 80 MG: 80 INJECTION, SOLUTION INTRAVENOUS; SUBCUTANEOUS at 08:26

## 2018-10-31 RX ADMIN — ACETAMINOPHEN 650 MG: 325 TABLET, FILM COATED ORAL at 00:17

## 2018-10-31 RX ADMIN — LINACLOTIDE 290 MCG: 145 CAPSULE, GELATIN COATED ORAL at 08:26

## 2018-10-31 RX ADMIN — BUDESONIDE 500 MCG: 0.5 SUSPENSION RESPIRATORY (INHALATION) at 11:45

## 2018-10-31 RX ADMIN — IPRATROPIUM BROMIDE AND ALBUTEROL SULFATE 1 AMPULE: .5; 3 SOLUTION RESPIRATORY (INHALATION) at 11:50

## 2018-10-31 RX ADMIN — FORMOTEROL FUMARATE DIHYDRATE 20 MCG: 20 SOLUTION RESPIRATORY (INHALATION) at 11:51

## 2018-10-31 RX ADMIN — DOCUSATE SODIUM 100 MG: 100 CAPSULE, LIQUID FILLED ORAL at 00:25

## 2018-10-31 RX ADMIN — GABAPENTIN 300 MG: 300 CAPSULE ORAL at 08:27

## 2018-10-31 RX ADMIN — PANTOPRAZOLE SODIUM 40 MG: 40 TABLET, DELAYED RELEASE ORAL at 08:27

## 2018-10-31 RX ADMIN — Medication 10 ML: at 08:28

## 2018-10-31 RX ADMIN — ASPIRIN 81 MG: 81 TABLET ORAL at 08:27

## 2018-10-31 RX ADMIN — MULTIPLE VITAMINS W/ MINERALS TAB 1 TABLET: TAB at 08:27

## 2018-10-31 RX ADMIN — FERROUS SULFATE TAB 325 MG (65 MG ELEMENTAL FE) 325 MG: 325 (65 FE) TAB at 08:27

## 2018-10-31 RX ADMIN — SENNOSIDES AND DOCUSATE SODIUM 2 TABLET: 8.6; 5 TABLET ORAL at 08:27

## 2018-10-31 RX ADMIN — DEXAMETHASONE 4 MG: 4 TABLET ORAL at 08:27

## 2018-10-31 RX ADMIN — OXYCODONE AND ACETAMINOPHEN 1 TABLET: 5; 325 TABLET ORAL at 08:28

## 2018-10-31 RX ADMIN — ATORVASTATIN CALCIUM 10 MG: 10 TABLET, FILM COATED ORAL at 08:27

## 2018-10-31 ASSESSMENT — PAIN DESCRIPTION - ONSET
ONSET: ON-GOING
ONSET: ON-GOING

## 2018-10-31 ASSESSMENT — PAIN DESCRIPTION - LOCATION
LOCATION: FLANK
LOCATION: ABDOMEN;BACK

## 2018-10-31 ASSESSMENT — PAIN DESCRIPTION - DESCRIPTORS
DESCRIPTORS: ACHING;DISCOMFORT;SORE
DESCRIPTORS: DISCOMFORT

## 2018-10-31 ASSESSMENT — PAIN DESCRIPTION - ORIENTATION
ORIENTATION: LEFT
ORIENTATION: MID;LEFT

## 2018-10-31 ASSESSMENT — PAIN DESCRIPTION - FREQUENCY
FREQUENCY: CONTINUOUS
FREQUENCY: CONTINUOUS

## 2018-10-31 ASSESSMENT — PAIN SCALES - GENERAL
PAINLEVEL_OUTOF10: 7
PAINLEVEL_OUTOF10: 8
PAINLEVEL_OUTOF10: 0
PAINLEVEL_OUTOF10: 3

## 2018-10-31 ASSESSMENT — PAIN DESCRIPTION - PROGRESSION
CLINICAL_PROGRESSION: NOT CHANGED
CLINICAL_PROGRESSION: NOT CHANGED

## 2018-10-31 ASSESSMENT — PAIN DESCRIPTION - PAIN TYPE
TYPE: ACUTE PAIN
TYPE: ACUTE PAIN

## 2018-10-31 NOTE — PROGRESS NOTES
Yenifer Palacio M.D.,[facility]  Margo Currie D.O., F.A.C.O.I., Meredith Melendez M.D. Aby Nash M.D., Roland Pardaa M.D. Daily Pulmonary Progress Note    Patient:  Keith Ramos 65 y.o. male MRN: [id number]     Date of Service: 10/31/2018      Synopsis     We are following patient for PE acute hypoxic respiratory failure     \"CC\" weakness , left sided chest pain     Code status:DNR-CCA   Subjective      Patient was seen and examined. He is up to chair, ambulating previously in room c/o only mild discomfort left sided bone and chest pain. Denies SOB , no cough no hemoptysis . He has no confusion today , he is alert asking questions about discharge. He is much improved with mentation. Sitter at bedside. Oral intake improving. Wife at bedside discussed options for anticoagulation for discharge. Patient and family will agree to try Eliquis free for 30 days will need to set up prior authorization for this medication with primary physician upon discharge. This was discussed in detail with case management at bedside, and she is agreeable. The lovenox cost co pay $910 for one month supply. Review of Systems:  Constitutional: Denies fever, weight loss, night sweats, and fatigue  Skin: Denies pigmentation, dark lesions, and rashes   HEENT: Denies hearing loss, tinnitus, ear drainage, epistaxis, sore throat, and hoarseness. Cardiovascular: Denies palpitations, chest pain, and chest pressure. Respiratory: Denies cough, dyspnea at rest, hemoptysis, apnea, and choking.   Gastrointestinal: Denies nausea, vomiting, poor appetite, diarrhea, heartburn or reflux  Genitourinary: Denies dysuria, frequency, urgency or hematuria  Musculoskeletal: Denies myalgias, muscle weakness, and bone pain  Neurological: Denies dizziness, vertigo, headache, no confusion       24-hour events:    None   Objective   Vitals: /73   Pulse 95   Temp 98.1 °F (36.7 °C) (Oral)   Resp 18   Ht 5' 6\" (1.676 m)

## 2018-10-31 NOTE — CARE COORDINATION
10/31/2018  received call from [facility] that the we called by dr. Rony Morrison with wife and she is agreeable to [facility]. [facility] canceled and referral made and faxed to [facility] at [facility].

## 2018-10-31 NOTE — PROGRESS NOTES
Hospital Medicine    Subjective:  Pt seen this am alert conversive reji diet      Current Facility-Administered Medications:     dexamethasone (DECADRON) tablet 4 mg, 4 mg, Oral, 2 times per day, Bogdan Puckett DO, 4 mg at 10/31/18 0827    apixaban (ELIQUIS) tablet 10 mg, 10 mg, Oral, BID **FOLLOWED BY** [START ON 11/7/2018] apixaban (ELIQUIS) tablet 5 mg, 5 mg, Oral, BID, Igor Vargas MD    potassium bicarbonate (K-LYTE) disintegrating tablet 50 mEq, 50 mEq, Oral, Once, Bogdan uPckett DO    oxyCODONE-acetaminophen (PERCOCET) 5-325 MG per tablet 1 tablet, 1 tablet, Oral, Q4H PRN, Nini Hamlin DO, 1 tablet at 10/31/18 8876    formoterol (PERFOROMIST) nebulizer solution 20 mcg, 20 mcg, Nebulization, BID, Igor Vargas MD, 20 mcg at 10/31/18 1151    budesonide (PULMICORT) nebulizer suspension 500 mcg, 500 mcg, Nebulization, BID, Igor Vargas MD, 500 mcg at 10/31/18 1145    ipratropium-albuterol (DUONEB) nebulizer solution 1 ampule, 1 ampule, Inhalation, Q4H While awake, Igor Vargas MD, 1 ampule at 10/31/18 1150    ALPRAZolam Jazminnn Nelida) tablet 0.25 mg, 0.25 mg, Oral, BID PRN, SUNSHINE Jamsion CNP, 0.25 mg at 10/30/18 1532    sennosides-docusate sodium (SENOKOT-S) 8.6-50 MG tablet 2 tablet, 2 tablet, Oral, Daily, SUNSHINE Jamison CNP, 2 tablet at 10/31/18 0827    mineral oil-hydrophilic petrolatum (HYDROPHOR) ointment, , Topical, BID PRN, Navin Grande DO    ferrous sulfate tablet 325 mg, 325 mg, Oral, Daily with breakfast, Larissa Escamilla MD, 325 mg at 10/31/18 0827    bisacodyl (DULCOLAX) suppository 10 mg, 10 mg, Rectal, Daily PRN, Navin Grande DO, 10 mg at 10/30/18 0611    lidocaine (LIDODERM) 5 % 1 patch, 1 patch, Transdermal, Daily, Navin Grande DO, 1 patch at 10/31/18 0826    acetaminophen (TYLENOL) tablet 650 mg, 650 mg, Oral, Q4H PRN, Navin Grande DO, 650 mg at 10/31/18 0017    aspirin EC tablet 81 mg, 81 mg, Oral, Daily, Navin Grande, DO, 81 mg at 10/31/18 0827    atorvastatin (LIPITOR) tablet 10 mg, 10 mg, Oral, Daily, Navin COX Popovec, DO, 10 mg at 10/31/18 0827    docusate sodium (COLACE) capsule 100 mg, 100 mg, Oral, BID PRN, Navin COX Popovec, DO, 100 mg at 10/31/18 0025    gabapentin (NEURONTIN) capsule 300 mg, 300 mg, Oral, TID, Navin COX Popovec, DO, 300 mg at 10/31/18 0827    linaclotide (LINZESS) capsule 290 mcg, 290 mcg, Oral, Daily, Navin COX Popovec, DO, 290 mcg at 10/31/18 0826    therapeutic multivitamin-minerals 1 tablet, 1 tablet, Oral, Daily, Navin P Popovec, DO, 1 tablet at 10/31/18 0827    pantoprazole (PROTONIX) tablet 40 mg, 40 mg, Oral, Daily, Navin COX Popovec, DO, 40 mg at 10/31/18 0827    sodium chloride flush 0.9 % injection 10 mL, 10 mL, Intravenous, 2 times per day, Navin P Popovec, DO, 10 mL at 10/31/18 0828    sodium chloride flush 0.9 % injection 10 mL, 10 mL, Intravenous, PRN, Navin COX Popovec, DO, 10 mL at 10/26/18 1517    magnesium hydroxide (MILK OF MAGNESIA) 400 MG/5ML suspension 30 mL, 30 mL, Oral, Daily PRN, Navin COX Popovec, DO, 30 mL at 10/30/18 1836    ondansetron (ZOFRAN) injection 4 mg, 4 mg, Intravenous, Q6H PRN, Navin Lowryovec, DO    perflutren lipid microspheres (DEFINITY) injection 1.65 mg, 1.5 mL, Intravenous, ONCE PRN, SUNSHINE Vivas    Current Outpatient Prescriptions:     ALPRAZolam (XANAX) 0.25 MG tablet, Take 1 tablet by mouth 2 times daily as needed for Anxiety for up to 30 days. ., Disp: 20 tablet, Rfl: 0    apixaban (ELIQUIS) 5 MG TABS tablet, Take 2 tablets by mouth 2 times daily for 7 days, Disp: 28 tablet, Rfl: 0    [START ON 11/7/2018] apixaban (ELIQUIS) 5 MG TABS tablet, Take 1 tablet by mouth 2 times daily, Disp: 60 tablet, Rfl: 3    oxyCODONE-acetaminophen (PERCOCET) 5-325 MG per tablet, Take 1 tablet by mouth every 6 hours as needed for Pain for up to 7 days. ., Disp: 28 tablet, Rfl: 0    dexamethasone (DECADRON) 4 MG tablet, Take 1 tablet by mouth 2 times daily (with meals) for 10 days, Disp: 20 tablet, Rfl: 0    ferrous sulfate 325 (65 Fe) MG tablet, Take 1 tablet by mouth daily (with breakfast), Disp: 30 tablet, Rfl: 3    gabapentin (NEURONTIN) 300 MG capsule, Take 1 capsule by mouth 3 times daily. ., Disp: , Rfl:     docusate sodium (COLACE, DULCOLAX) 100 MG CAPS, Take 100 mg by mouth 2 times daily as needed for Constipation, Disp: 40 capsule, Rfl: 0    Multiple Vitamins-Minerals (CENTRUM SILVER) TABS, Take 1 tablet by mouth daily STOP PREOP MED , Disp: , Rfl:     linaclotide (LINZESS) 290 MCG CAPS capsule, Take 290 mcg by mouth daily , Disp: , Rfl:     pantoprazole (PROTONIX) 40 MG tablet, Take 40 mg by mouth daily, Disp: , Rfl: 3    umeclidinium-vilanterol (ANORO ELLIPTA) 62.5-25 MCG/INH AEPB inhaler, Inhale 1 puff into the lungs daily, Disp: 1 each, Rfl: 5    atorvastatin (LIPITOR) 10 MG tablet, Take 10 mg by mouth daily, Disp: , Rfl:     aspirin EC 81 MG EC tablet, Take 81 mg by mouth daily, Disp: , Rfl:     Objective:    /73   Pulse 95   Temp 98.1 °F (36.7 °C) (Oral)   Resp 18   Ht 5' 6\" (1.676 m)   Wt 159 lb 4 oz (72.2 kg)   SpO2 99%   BMI 25.70 kg/m²     Heart:  Reg  Lungs:  CTA bilaterally, no wheeze, rales or rhonchi  Abd: bowel sounds present, nontender, nondistended, no masses  Extrem:  No clubbing, cyanosis, or edema    CBC with Differential:    Lab Results   Component Value Date    WBC 15.0 10/31/2018    RBC 4.05 10/31/2018    HGB 9.9 10/31/2018    HCT 33.2 10/31/2018     10/31/2018    MCV 82.0 10/31/2018    MCH 24.4 10/31/2018    MCHC 29.8 10/31/2018    RDW 19.9 10/31/2018    LYMPHOPCT 13.9 10/29/2018    MONOPCT 6.8 10/29/2018    BASOPCT 0.1 10/29/2018    MONOSABS 0.84 10/29/2018    LYMPHSABS 1.71 10/29/2018    EOSABS 0.03 10/29/2018    BASOSABS 0.01 10/29/2018     CMP:    Lab Results   Component Value Date     10/31/2018    K 4.3 10/31/2018    K 4.7 06/07/2018    CL 95 10/31/2018    CO2 30 10/31/2018    BUN 20 10/31/2018    CREATININE 0.7

## 2018-11-01 ENCOUNTER — HOSPITAL ENCOUNTER (OUTPATIENT)
Age: 78
Setting detail: OBSERVATION
Discharge: OTHER INSTITUTION | End: 2018-11-03
Attending: EMERGENCY MEDICINE | Admitting: INTERNAL MEDICINE
Payer: MEDICARE

## 2018-11-01 ENCOUNTER — APPOINTMENT (OUTPATIENT)
Dept: CT IMAGING | Age: 78
End: 2018-11-01
Payer: MEDICARE

## 2018-11-01 ENCOUNTER — APPOINTMENT (OUTPATIENT)
Dept: GENERAL RADIOLOGY | Age: 78
End: 2018-11-01
Payer: MEDICARE

## 2018-11-01 DIAGNOSIS — R41.82 ALTERED MENTAL STATUS, UNSPECIFIED ALTERED MENTAL STATUS TYPE: Primary | ICD-10-CM

## 2018-11-01 DIAGNOSIS — C79.9 METASTATIC DISEASE (HCC): ICD-10-CM

## 2018-11-01 LAB
ALBUMIN SERPL-MCNC: 2.2 G/DL (ref 3.5–5.2)
ALP BLD-CCNC: 251 U/L (ref 40–129)
ALT SERPL-CCNC: 27 U/L (ref 0–40)
ANION GAP SERPL CALCULATED.3IONS-SCNC: 13 MMOL/L (ref 7–16)
ANISOCYTOSIS: ABNORMAL
APTT: 31.5 SEC (ref 24.5–35.1)
AST SERPL-CCNC: 28 U/L (ref 0–39)
BASOPHILS ABSOLUTE: 0.02 E9/L (ref 0–0.2)
BASOPHILS RELATIVE PERCENT: 0.1 % (ref 0–2)
BILIRUB SERPL-MCNC: 0.5 MG/DL (ref 0–1.2)
BILIRUBIN URINE: NEGATIVE
BLOOD, URINE: NEGATIVE
BUN BLDV-MCNC: 32 MG/DL (ref 8–23)
CALCIUM SERPL-MCNC: 9 MG/DL (ref 8.6–10.2)
CHLORIDE BLD-SCNC: 96 MMOL/L (ref 98–107)
CHP ED QC CHECK: NORMAL
CLARITY: CLEAR
CO2: 28 MMOL/L (ref 22–29)
COLOR: YELLOW
CREAT SERPL-MCNC: 0.8 MG/DL (ref 0.7–1.2)
EOSINOPHILS ABSOLUTE: 0.07 E9/L (ref 0.05–0.5)
EOSINOPHILS RELATIVE PERCENT: 0.5 % (ref 0–6)
GFR AFRICAN AMERICAN: >60
GFR NON-AFRICAN AMERICAN: >60 ML/MIN/1.73
GLUCOSE BLD-MCNC: 102 MG/DL (ref 74–109)
GLUCOSE BLD-MCNC: 98 MG/DL
GLUCOSE URINE: NEGATIVE MG/DL
HCT VFR BLD CALC: 32.7 % (ref 37–54)
HEMOGLOBIN: 9.8 G/DL (ref 12.5–16.5)
HYPOCHROMIA: ABNORMAL
IMMATURE GRANULOCYTES #: 0.16 E9/L
IMMATURE GRANULOCYTES %: 1.1 % (ref 0–5)
INR BLD: 1.5
KETONES, URINE: NEGATIVE MG/DL
LACTIC ACID: 2 MMOL/L (ref 0.5–2.2)
LEUKOCYTE ESTERASE, URINE: NEGATIVE
LYMPHOCYTES ABSOLUTE: 2.04 E9/L (ref 1.5–4)
LYMPHOCYTES RELATIVE PERCENT: 14.2 % (ref 20–42)
MCH RBC QN AUTO: 24.3 PG (ref 26–35)
MCHC RBC AUTO-ENTMCNC: 30 % (ref 32–34.5)
MCV RBC AUTO: 81.1 FL (ref 80–99.9)
METER GLUCOSE: 98 MG/DL (ref 70–110)
MONOCYTES ABSOLUTE: 0.94 E9/L (ref 0.1–0.95)
MONOCYTES RELATIVE PERCENT: 6.5 % (ref 2–12)
NEUTROPHILS ABSOLUTE: 11.15 E9/L (ref 1.8–7.3)
NEUTROPHILS RELATIVE PERCENT: 77.6 % (ref 43–80)
NITRITE, URINE: NEGATIVE
PDW BLD-RTO: 20.3 FL (ref 11.5–15)
PH UA: 7 (ref 5–9)
PLATELET # BLD: 496 E9/L (ref 130–450)
PMV BLD AUTO: 9.8 FL (ref 7–12)
POLYCHROMASIA: ABNORMAL
POTASSIUM SERPL-SCNC: 4.5 MMOL/L (ref 3.5–5)
PROTEIN UA: NEGATIVE MG/DL
PROTHROMBIN TIME: 16.7 SEC (ref 9.3–12.4)
RBC # BLD: 4.03 E12/L (ref 3.8–5.8)
SODIUM BLD-SCNC: 137 MMOL/L (ref 132–146)
SPECIFIC GRAVITY UA: 1.01 (ref 1–1.03)
TOTAL PROTEIN: 6.8 G/DL (ref 6.4–8.3)
TROPONIN: <0.01 NG/ML (ref 0–0.03)
UROBILINOGEN, URINE: 0.2 E.U./DL
WBC # BLD: 14.4 E9/L (ref 4.5–11.5)

## 2018-11-01 PROCEDURE — 96375 TX/PRO/DX INJ NEW DRUG ADDON: CPT

## 2018-11-01 PROCEDURE — 94761 N-INVAS EAR/PLS OXIMETRY MLT: CPT

## 2018-11-01 PROCEDURE — 82962 GLUCOSE BLOOD TEST: CPT

## 2018-11-01 PROCEDURE — 6360000002 HC RX W HCPCS: Performed by: EMERGENCY MEDICINE

## 2018-11-01 PROCEDURE — 81003 URINALYSIS AUTO W/O SCOPE: CPT

## 2018-11-01 PROCEDURE — 85730 THROMBOPLASTIN TIME PARTIAL: CPT

## 2018-11-01 PROCEDURE — 85025 COMPLETE CBC W/AUTO DIFF WBC: CPT

## 2018-11-01 PROCEDURE — 96374 THER/PROPH/DIAG INJ IV PUSH: CPT

## 2018-11-01 PROCEDURE — 80053 COMPREHEN METABOLIC PANEL: CPT

## 2018-11-01 PROCEDURE — 70450 CT HEAD/BRAIN W/O DYE: CPT

## 2018-11-01 PROCEDURE — 71045 X-RAY EXAM CHEST 1 VIEW: CPT

## 2018-11-01 PROCEDURE — 2580000003 HC RX 258: Performed by: EMERGENCY MEDICINE

## 2018-11-01 PROCEDURE — 83605 ASSAY OF LACTIC ACID: CPT

## 2018-11-01 PROCEDURE — 99285 EMERGENCY DEPT VISIT HI MDM: CPT

## 2018-11-01 PROCEDURE — 71250 CT THORAX DX C-: CPT

## 2018-11-01 PROCEDURE — 84484 ASSAY OF TROPONIN QUANT: CPT

## 2018-11-01 PROCEDURE — 36415 COLL VENOUS BLD VENIPUNCTURE: CPT

## 2018-11-01 PROCEDURE — 85610 PROTHROMBIN TIME: CPT

## 2018-11-01 PROCEDURE — 87040 BLOOD CULTURE FOR BACTERIA: CPT

## 2018-11-01 RX ORDER — 0.9 % SODIUM CHLORIDE 0.9 %
1000 INTRAVENOUS SOLUTION INTRAVENOUS ONCE
Status: COMPLETED | OUTPATIENT
Start: 2018-11-01 | End: 2018-11-01

## 2018-11-01 RX ORDER — FENTANYL CITRATE 50 UG/ML
50 INJECTION, SOLUTION INTRAMUSCULAR; INTRAVENOUS ONCE
Status: COMPLETED | OUTPATIENT
Start: 2018-11-01 | End: 2018-11-01

## 2018-11-01 RX ORDER — SODIUM CHLORIDE 9 MG/ML
INJECTION, SOLUTION INTRAVENOUS CONTINUOUS
Status: DISCONTINUED | OUTPATIENT
Start: 2018-11-01 | End: 2018-11-02 | Stop reason: SDUPTHER

## 2018-11-01 RX ORDER — LORAZEPAM 2 MG/ML
1 INJECTION INTRAMUSCULAR ONCE
Status: COMPLETED | OUTPATIENT
Start: 2018-11-01 | End: 2018-11-01

## 2018-11-01 RX ADMIN — SODIUM CHLORIDE 1000 ML: 9 INJECTION, SOLUTION INTRAVENOUS at 16:01

## 2018-11-01 RX ADMIN — LORAZEPAM 1 MG: 2 INJECTION INTRAMUSCULAR; INTRAVENOUS at 19:48

## 2018-11-01 RX ADMIN — FENTANYL CITRATE 50 MCG: 50 INJECTION, SOLUTION INTRAMUSCULAR; INTRAVENOUS at 19:01

## 2018-11-01 RX ADMIN — SODIUM CHLORIDE: 9 INJECTION, SOLUTION INTRAVENOUS at 18:48

## 2018-11-01 ASSESSMENT — PAIN SCALES - GENERAL: PAINLEVEL_OUTOF10: 10

## 2018-11-01 NOTE — ED PROVIDER NOTES
11.5 - 15.0 fL    Platelets 848 (H) 288 - 450 E9/L    MPV 9.8 7.0 - 12.0 fL    Neutrophils % 77.6 43.0 - 80.0 %    Immature Granulocytes % 1.1 0.0 - 5.0 %    Lymphocytes % 14.2 (L) 20.0 - 42.0 %    Monocytes % 6.5 2.0 - 12.0 %    Eosinophils % 0.5 0.0 - 6.0 %    Basophils % 0.1 0.0 - 2.0 %    Neutrophils # 11.15 (H) 1.80 - 7.30 E9/L    Immature Granulocytes # 0.16 E9/L    Lymphocytes # 2.04 1.50 - 4.00 E9/L    Monocytes # 0.94 0.10 - 0.95 E9/L    Eosinophils # 0.07 0.05 - 0.50 E9/L    Basophils # 0.02 0.00 - 0.20 E9/L    Anisocytosis 1+     Polychromasia 1+     Hypochromia 1+    Comprehensive Metabolic Panel   Result Value Ref Range    Sodium 137 132 - 146 mmol/L    Potassium 4.5 3.5 - 5.0 mmol/L    Chloride 96 (L) 98 - 107 mmol/L    CO2 28 22 - 29 mmol/L    Anion Gap 13 7 - 16 mmol/L    Glucose 102 74 - 109 mg/dL    BUN 32 (H) 8 - 23 mg/dL    CREATININE 0.8 0.7 - 1.2 mg/dL    GFR Non-African American >60 >=60 mL/min/1.73    GFR African American >60     Calcium 9.0 8.6 - 10.2 mg/dL    Total Protein 6.8 6.4 - 8.3 g/dL    Alb 2.2 (L) 3.5 - 5.2 g/dL    Total Bilirubin 0.5 0.0 - 1.2 mg/dL    Alkaline Phosphatase 251 (H) 40 - 129 U/L    ALT 27 0 - 40 U/L    AST 28 0 - 39 U/L   APTT   Result Value Ref Range    aPTT 31.5 24.5 - 35.1 sec   Protime-INR   Result Value Ref Range    Protime 16.7 (H) 9.3 - 12.4 sec    INR 1.5    Troponin   Result Value Ref Range    Troponin <0.01 0.00 - 0.03 ng/mL   Urinalysis   Result Value Ref Range    Color, UA Yellow Straw/Yellow    Clarity, UA Clear Clear    Glucose, Ur Negative Negative mg/dL    Bilirubin Urine Negative Negative    Ketones, Urine Negative Negative mg/dL    Specific Gravity, UA 1.015 1.005 - 1.030    Blood, Urine Negative Negative    pH, UA 7.0 5.0 - 9.0    Protein, UA Negative Negative mg/dL    Urobilinogen, Urine 0.2 <2.0 E.U./dL    Nitrite, Urine Negative Negative    Leukocyte Esterase, Urine Negative Negative   POCT Glucose   Result Value Ref Range    Glucose 98 mg/dL reviewed. Wife bedside, she states the patient seems to be confused and hallucinating. This is not his baseline. Discussed with Dr. Bobby Gregorio, states the patient had an extensive workup in the hospital. He states that he was started on Decadron along with narcotics which could be a component to his confused state. Patient also had extensive workup in the hospital including biopsies for this  nonspecific cancer that he seems to have. He was supposed to follow-up with the [facility] clinic, he has not. Given his findings, felt transfer to clinic clinic was reasonable. Discussed with Dr. Boyd Kincaid at the [facility] clinic, patient has been accepted. Critical Care Time: 30 minutes    Counseling: The emergency provider has spoken with the patient and spouse and discussed todays results, in addition to providing specific details for the plan of care and counseling regarding the diagnosis and prognosis. Questions are answered at this time and they are agreeable with the plan.       --------------------------------- IMPRESSION AND DISPOSITION ---------------------------------    IMPRESSION  1. Altered mental status, unspecified altered mental status type    2. Metastatic disease ([address]        DISPOSITION  Disposition: Admit to med/surg floor  Patient condition is stable    NOTE: This report was transcribed using voice recognition software.  Every effort was made to ensure accuracy; however, inadvertent computerized transcription errors may be present       Morgan Carmichael MD  11/03/18 [id number]

## 2018-11-02 PROBLEM — I67.9 CEREBROVASCULAR DISEASE: Chronic | Status: ACTIVE | Noted: 2018-11-02

## 2018-11-02 PROBLEM — E83.52 HYPERCALCEMIA OF MALIGNANCY: Status: RESOLVED | Noted: 2018-10-22 | Resolved: 2018-11-02

## 2018-11-02 PROBLEM — I26.99 PULMONARY EMBOLISM (HCC): Chronic | Status: ACTIVE | Noted: 2018-11-02

## 2018-11-02 PROBLEM — R41.0 ACUTE DELIRIUM: Status: ACTIVE | Noted: 2018-11-02

## 2018-11-02 PROBLEM — Z79.01 CHRONIC ANTICOAGULATION: Chronic | Status: ACTIVE | Noted: 2018-11-02

## 2018-11-02 PROBLEM — Z87.891 HISTORY OF TOBACCO ABUSE: Chronic | Status: ACTIVE | Noted: 2018-11-02

## 2018-11-02 PROBLEM — Z95.828 HX OF ENDOVASCULAR STENT GRAFT FOR ABDOMINAL AORTIC ANEURYSM: Chronic | Status: ACTIVE | Noted: 2017-12-02

## 2018-11-02 PROBLEM — J18.9 COMMUNITY ACQUIRED PNEUMONIA OF LEFT LOWER LOBE OF LUNG: Status: RESOLVED | Noted: 2018-04-10 | Resolved: 2018-11-02

## 2018-11-02 PROBLEM — I48.91 ATRIAL FIBRILLATION WITH RVR (HCC): Status: RESOLVED | Noted: 2018-04-12 | Resolved: 2018-11-02

## 2018-11-02 PROBLEM — B34.8 INFECTION DUE TO HUMAN METAPNEUMOVIRUS (HMPV): Status: RESOLVED | Noted: 2018-04-11 | Resolved: 2018-11-02

## 2018-11-02 PROBLEM — R41.82 ALTERED MENTAL STATUS: Status: ACTIVE | Noted: 2018-11-02

## 2018-11-02 PROBLEM — J90 RECURRENT LEFT PLEURAL EFFUSION: Status: RESOLVED | Noted: 2018-06-11 | Resolved: 2018-11-02

## 2018-11-02 PROBLEM — M89.9 LYTIC BONE LESIONS ON XRAY: Status: ACTIVE | Noted: 2018-11-02

## 2018-11-02 PROBLEM — J10.1 INFLUENZA B: Status: RESOLVED | Noted: 2018-04-11 | Resolved: 2018-11-02

## 2018-11-02 PROCEDURE — 6370000000 HC RX 637 (ALT 250 FOR IP): Performed by: INTERNAL MEDICINE

## 2018-11-02 PROCEDURE — G0378 HOSPITAL OBSERVATION PER HR: HCPCS

## 2018-11-02 PROCEDURE — 2580000003 HC RX 258: Performed by: INTERNAL MEDICINE

## 2018-11-02 RX ORDER — PANTOPRAZOLE SODIUM 40 MG/1
40 TABLET, DELAYED RELEASE ORAL DAILY
Status: DISCONTINUED | OUTPATIENT
Start: 2018-11-02 | End: 2018-11-03 | Stop reason: HOSPADM

## 2018-11-02 RX ORDER — GABAPENTIN 300 MG/1
300 CAPSULE ORAL 3 TIMES DAILY
Status: DISCONTINUED | OUTPATIENT
Start: 2018-11-02 | End: 2018-11-03 | Stop reason: HOSPADM

## 2018-11-02 RX ORDER — DOCUSATE SODIUM 100 MG/1
100 CAPSULE, LIQUID FILLED ORAL 2 TIMES DAILY
Status: DISCONTINUED | OUTPATIENT
Start: 2018-11-02 | End: 2018-11-03 | Stop reason: HOSPADM

## 2018-11-02 RX ORDER — OXYCODONE HYDROCHLORIDE AND ACETAMINOPHEN 5; 325 MG/1; MG/1
1 TABLET ORAL EVERY 6 HOURS PRN
Status: DISCONTINUED | OUTPATIENT
Start: 2018-11-02 | End: 2018-11-03 | Stop reason: HOSPADM

## 2018-11-02 RX ORDER — ATORVASTATIN CALCIUM 10 MG/1
10 TABLET, FILM COATED ORAL DAILY
Status: DISCONTINUED | OUTPATIENT
Start: 2018-11-02 | End: 2018-11-03 | Stop reason: HOSPADM

## 2018-11-02 RX ORDER — ALPRAZOLAM 0.25 MG/1
0.25 TABLET ORAL 2 TIMES DAILY PRN
Status: DISCONTINUED | OUTPATIENT
Start: 2018-11-02 | End: 2018-11-03 | Stop reason: HOSPADM

## 2018-11-02 RX ORDER — FERROUS SULFATE 325(65) MG
325 TABLET ORAL
Status: DISCONTINUED | OUTPATIENT
Start: 2018-11-02 | End: 2018-11-03 | Stop reason: HOSPADM

## 2018-11-02 RX ORDER — ONDANSETRON 2 MG/ML
4 INJECTION INTRAMUSCULAR; INTRAVENOUS EVERY 6 HOURS PRN
Status: DISCONTINUED | OUTPATIENT
Start: 2018-11-02 | End: 2018-11-03 | Stop reason: HOSPADM

## 2018-11-02 RX ORDER — SODIUM CHLORIDE 0.9 % (FLUSH) 0.9 %
10 SYRINGE (ML) INJECTION EVERY 12 HOURS SCHEDULED
Status: DISCONTINUED | OUTPATIENT
Start: 2018-11-02 | End: 2018-11-03 | Stop reason: HOSPADM

## 2018-11-02 RX ORDER — SODIUM CHLORIDE 0.9 % (FLUSH) 0.9 %
10 SYRINGE (ML) INJECTION PRN
Status: DISCONTINUED | OUTPATIENT
Start: 2018-11-02 | End: 2018-11-03 | Stop reason: HOSPADM

## 2018-11-02 RX ORDER — SODIUM CHLORIDE 9 MG/ML
INJECTION, SOLUTION INTRAVENOUS CONTINUOUS
Status: DISCONTINUED | OUTPATIENT
Start: 2018-11-02 | End: 2018-11-03 | Stop reason: HOSPADM

## 2018-11-02 RX ORDER — M-VIT,TX,IRON,MINS/CALC/FOLIC 27MG-0.4MG
1 TABLET ORAL DAILY
Status: DISCONTINUED | OUTPATIENT
Start: 2018-11-02 | End: 2018-11-03 | Stop reason: HOSPADM

## 2018-11-02 RX ORDER — ASPIRIN 81 MG/1
81 TABLET ORAL DAILY
Status: DISCONTINUED | OUTPATIENT
Start: 2018-11-02 | End: 2018-11-03 | Stop reason: HOSPADM

## 2018-11-02 RX ORDER — BISACODYL 10 MG
10 SUPPOSITORY, RECTAL RECTAL DAILY PRN
Status: DISCONTINUED | OUTPATIENT
Start: 2018-11-02 | End: 2018-11-03 | Stop reason: HOSPADM

## 2018-11-02 RX ORDER — ACETAMINOPHEN 325 MG/1
650 TABLET ORAL EVERY 4 HOURS PRN
Status: DISCONTINUED | OUTPATIENT
Start: 2018-11-02 | End: 2018-11-03 | Stop reason: HOSPADM

## 2018-11-02 RX ADMIN — SODIUM CHLORIDE: 9 INJECTION, SOLUTION INTRAVENOUS at 20:57

## 2018-11-02 RX ADMIN — PANTOPRAZOLE SODIUM 40 MG: 40 TABLET, DELAYED RELEASE ORAL at 10:06

## 2018-11-02 RX ADMIN — APIXABAN 10 MG: 5 TABLET, FILM COATED ORAL at 10:06

## 2018-11-02 RX ADMIN — MULTIPLE VITAMINS W/ MINERALS TAB 1 TABLET: TAB at 10:07

## 2018-11-02 RX ADMIN — DOCUSATE SODIUM 100 MG: 100 CAPSULE, LIQUID FILLED ORAL at 20:55

## 2018-11-02 RX ADMIN — LINACLOTIDE 290 MCG: 145 CAPSULE, GELATIN COATED ORAL at 10:07

## 2018-11-02 RX ADMIN — ALPRAZOLAM 0.25 MG: 0.25 TABLET ORAL at 23:47

## 2018-11-02 RX ADMIN — GLYCOPYRROLATE AND FORMOTEROL FUMARATE 2 PUFF: 9; 4.8 AEROSOL, METERED RESPIRATORY (INHALATION) at 20:53

## 2018-11-02 RX ADMIN — GABAPENTIN 300 MG: 300 CAPSULE ORAL at 10:06

## 2018-11-02 RX ADMIN — ALPRAZOLAM 0.25 MG: 0.25 TABLET ORAL at 07:39

## 2018-11-02 RX ADMIN — FERROUS SULFATE TAB 325 MG (65 MG ELEMENTAL FE) 325 MG: 325 (65 FE) TAB at 10:09

## 2018-11-02 RX ADMIN — DOCUSATE SODIUM 100 MG: 100 CAPSULE, LIQUID FILLED ORAL at 10:07

## 2018-11-02 RX ADMIN — OXYCODONE HYDROCHLORIDE AND ACETAMINOPHEN 1 TABLET: 5; 325 TABLET ORAL at 20:55

## 2018-11-02 RX ADMIN — OXYCODONE HYDROCHLORIDE AND ACETAMINOPHEN 1 TABLET: 5; 325 TABLET ORAL at 13:51

## 2018-11-02 RX ADMIN — OXYCODONE HYDROCHLORIDE AND ACETAMINOPHEN 1 TABLET: 5; 325 TABLET ORAL at 07:39

## 2018-11-02 RX ADMIN — GABAPENTIN 300 MG: 300 CAPSULE ORAL at 13:51

## 2018-11-02 RX ADMIN — ATORVASTATIN CALCIUM 10 MG: 10 TABLET, FILM COATED ORAL at 10:06

## 2018-11-02 RX ADMIN — GABAPENTIN 300 MG: 300 CAPSULE ORAL at 20:55

## 2018-11-02 RX ADMIN — APIXABAN 10 MG: 5 TABLET, FILM COATED ORAL at 20:55

## 2018-11-02 RX ADMIN — SODIUM CHLORIDE: 9 INJECTION, SOLUTION INTRAVENOUS at 10:05

## 2018-11-02 RX ADMIN — ASPIRIN 81 MG: 81 TABLET ORAL at 10:06

## 2018-11-02 ASSESSMENT — PAIN DESCRIPTION - LOCATION
LOCATION: GENERALIZED
LOCATION: GENERALIZED

## 2018-11-02 ASSESSMENT — PAIN DESCRIPTION - PAIN TYPE
TYPE: CHRONIC PAIN;ACUTE PAIN
TYPE: ACUTE PAIN;CHRONIC PAIN

## 2018-11-02 ASSESSMENT — PAIN SCALES - GENERAL
PAINLEVEL_OUTOF10: 10
PAINLEVEL_OUTOF10: 0
PAINLEVEL_OUTOF10: 6
PAINLEVEL_OUTOF10: 0
PAINLEVEL_OUTOF10: 7

## 2018-11-02 ASSESSMENT — PAIN DESCRIPTION - DESCRIPTORS
DESCRIPTORS: ACHING;CONSTANT;CRAMPING;DISCOMFORT
DESCRIPTORS: ACHING;CONSTANT;DISCOMFORT;CRUSHING

## 2018-11-03 VITALS
HEIGHT: 66 IN | WEIGHT: 170 LBS | RESPIRATION RATE: 20 BRPM | BODY MASS INDEX: 27.32 KG/M2 | TEMPERATURE: 97.4 F | HEART RATE: 112 BPM | OXYGEN SATURATION: 95 % | SYSTOLIC BLOOD PRESSURE: 134 MMHG | DIASTOLIC BLOOD PRESSURE: 78 MMHG

## 2018-11-03 LAB
ALBUMIN SERPL-MCNC: 1.7 G/DL (ref 3.5–5.2)
ALP BLD-CCNC: 262 U/L (ref 40–129)
ALT SERPL-CCNC: 51 U/L (ref 0–40)
ANION GAP SERPL CALCULATED.3IONS-SCNC: 14 MMOL/L (ref 7–16)
AST SERPL-CCNC: 68 U/L (ref 0–39)
BILIRUB SERPL-MCNC: 0.5 MG/DL (ref 0–1.2)
BUN BLDV-MCNC: 16 MG/DL (ref 8–23)
CALCIUM SERPL-MCNC: 8.7 MG/DL (ref 8.6–10.2)
CHLORIDE BLD-SCNC: 100 MMOL/L (ref 98–107)
CO2: 22 MMOL/L (ref 22–29)
CREAT SERPL-MCNC: 0.6 MG/DL (ref 0.7–1.2)
GFR AFRICAN AMERICAN: >60
GFR NON-AFRICAN AMERICAN: >60 ML/MIN/1.73
GLUCOSE BLD-MCNC: 90 MG/DL (ref 74–109)
HCT VFR BLD CALC: 35.8 % (ref 37–54)
HEMOGLOBIN: 10.8 G/DL (ref 12.5–16.5)
MCH RBC QN AUTO: 24.4 PG (ref 26–35)
MCHC RBC AUTO-ENTMCNC: 30.2 % (ref 32–34.5)
MCV RBC AUTO: 80.8 FL (ref 80–99.9)
PDW BLD-RTO: 20.5 FL (ref 11.5–15)
PLATELET # BLD: 463 E9/L (ref 130–450)
PMV BLD AUTO: 9.5 FL (ref 7–12)
POTASSIUM SERPL-SCNC: 4 MMOL/L (ref 3.5–5)
RBC # BLD: 4.43 E12/L (ref 3.8–5.8)
SODIUM BLD-SCNC: 136 MMOL/L (ref 132–146)
TOTAL PROTEIN: 6.9 G/DL (ref 6.4–8.3)
WBC # BLD: 13.2 E9/L (ref 4.5–11.5)

## 2018-11-03 PROCEDURE — 36415 COLL VENOUS BLD VENIPUNCTURE: CPT

## 2018-11-03 PROCEDURE — 80053 COMPREHEN METABOLIC PANEL: CPT

## 2018-11-03 PROCEDURE — 6370000000 HC RX 637 (ALT 250 FOR IP): Performed by: INTERNAL MEDICINE

## 2018-11-03 PROCEDURE — 85027 COMPLETE CBC AUTOMATED: CPT

## 2018-11-03 PROCEDURE — G0378 HOSPITAL OBSERVATION PER HR: HCPCS

## 2018-11-03 RX ORDER — CHLORHEXIDINE GLUCONATE 0.12 MG/ML
15 RINSE ORAL 2 TIMES DAILY
Status: DISCONTINUED | OUTPATIENT
Start: 2018-11-03 | End: 2018-11-03 | Stop reason: HOSPADM

## 2018-11-03 RX ADMIN — DOCUSATE SODIUM 100 MG: 100 CAPSULE, LIQUID FILLED ORAL at 08:05

## 2018-11-03 RX ADMIN — LINACLOTIDE 290 MCG: 145 CAPSULE, GELATIN COATED ORAL at 08:04

## 2018-11-03 RX ADMIN — ALPRAZOLAM 0.25 MG: 0.25 TABLET ORAL at 08:05

## 2018-11-03 RX ADMIN — ATORVASTATIN CALCIUM 10 MG: 10 TABLET, FILM COATED ORAL at 08:05

## 2018-11-03 RX ADMIN — GLYCOPYRROLATE AND FORMOTEROL FUMARATE 2 PUFF: 9; 4.8 AEROSOL, METERED RESPIRATORY (INHALATION) at 08:06

## 2018-11-03 RX ADMIN — MULTIPLE VITAMINS W/ MINERALS TAB 1 TABLET: TAB at 08:05

## 2018-11-03 RX ADMIN — OXYCODONE HYDROCHLORIDE AND ACETAMINOPHEN 1 TABLET: 5; 325 TABLET ORAL at 08:04

## 2018-11-03 RX ADMIN — ASPIRIN 81 MG: 81 TABLET ORAL at 08:05

## 2018-11-03 RX ADMIN — PANTOPRAZOLE SODIUM 40 MG: 40 TABLET, DELAYED RELEASE ORAL at 08:05

## 2018-11-03 RX ADMIN — APIXABAN 10 MG: 5 TABLET, FILM COATED ORAL at 08:05

## 2018-11-03 RX ADMIN — FERROUS SULFATE TAB 325 MG (65 MG ELEMENTAL FE) 325 MG: 325 (65 FE) TAB at 08:05

## 2018-11-03 RX ADMIN — GABAPENTIN 300 MG: 300 CAPSULE ORAL at 08:05

## 2018-11-03 ASSESSMENT — PAIN DESCRIPTION - PAIN TYPE: TYPE: ACUTE PAIN

## 2018-11-03 ASSESSMENT — PAIN SCALES - GENERAL
PAINLEVEL_OUTOF10: 8
PAINLEVEL_OUTOF10: 3

## 2018-11-03 ASSESSMENT — PAIN DESCRIPTION - DESCRIPTORS: DESCRIPTORS: ACHING;CONSTANT;DISCOMFORT

## 2018-11-03 ASSESSMENT — PAIN DESCRIPTION - FREQUENCY: FREQUENCY: CONTINUOUS

## 2018-11-03 ASSESSMENT — PAIN DESCRIPTION - LOCATION: LOCATION: GENERALIZED

## 2018-11-04 NOTE — DISCHARGE SUMMARY
TABS Take 1 tablet by mouth daily STOP PREOP MED Historical Med      linaclotide (LINZESS) 290 MCG CAPS capsule Take 290 mcg by mouth daily Historical Med      pantoprazole (PROTONIX) 40 MG tablet Take 40 mg by mouth daily, R-3Historical Med      umeclidinium-vilanterol (ANORO ELLIPTA) 62.5-25 MCG/INH AEPB inhaler Inhale 1 puff into the lungs daily, Disp-1 each, R-5Normal      atorvastatin (LIPITOR) 10 MG tablet Take 10 mg by mouth dailyHistorical Med      aspirin EC 81 MG EC tablet Take 81 mg by mouth dailyHistorical Med       !! - Potential duplicate medications found. Please discuss with provider. Activity: activity as tolerated  Diet: regular diet    Follow-up with Dr Sergio Gómez in 1 month. Note that over 30 minutes was spent in preparing discharge papers, discussing discharge with patient, medication review, etc.    Signed:  NEYMAR Umaña  11/3/2018  8:02 PM

## 2018-11-05 LAB
Lab: NORMAL
REPORT: NORMAL
THIS TEST SENT TO: NORMAL

## 2018-11-06 LAB
BLOOD CULTURE, ROUTINE: NORMAL
CULTURE, BLOOD 2: NORMAL
EKG ATRIAL RATE: 0 BPM
EKG Q-T INTERVAL: 0 MS
EKG QRS DURATION: 0 MS
EKG QTC CALCULATION (BAZETT): 0 MS
EKG R AXIS: 0 DEGREES
EKG T AXIS: 0 DEGREES
EKG VENTRICULAR RATE: 0 BPM

## 2018-11-15 LAB — PTH RELATED PEPTIDE: 10.5 PMOL/L (ref 0–2.3)

## 2021-12-14 NOTE — PROGRESS NOTES
Recent Labs      10/17/18   0830   PROBNP  986*     Recent Labs      10/17/18   1714   PROCAL  0.15*     This SmartLink has not been configured with any valid records. Results for Laly Cano (MRN [id number]) as of 10/18/2018 10:49   Ref. Range 10/18/2018 09:23   CEA Latest Ref Range: 0.0 - 5.2 ng/mL 3.4     Micro:  No results for input(s): CULTRESP in the last 72 hours. No results for input(s): LABGRAM in the last 72 hours. No results for input(s): LEGUR in the last 72 hours. No results for input(s): STREPNEUMAGU in the last 72 hours. No results for input(s): LP1UAG in the last 72 hours. Assessment:    1. Acute PE provoked by likely malignancy  2. Hypoxic respiratory failure related to above   3. Centrilobular emphysema   4. COPD GOLD Stage 1- not in acute exacerbation  5. 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 Unintentional 17 lb weight loss  7. Cigarette nicotine dependence - quit 4 months ago  8. Hx left VATS with  pleural biopsy 6/12/18,drainage of empyema and  insertion of pleur x, drain removed on 7/2/18  9. Hx previous asbestos exposure       Plan:   1. Oxygen therapy wean to keep >92%  2. Heparin infusion per nomogram  3. Check 2 D echo to assess RV- pending  3. BLE dopplers negative for DVT  4. Striverdi Respimat, Spiriva handihaler. Can resume Anoro upon discharge  5. Monitor off antibiotics Await respiratory cultures, film array. procalcitonin 0.15  6. Vascular surgery consulted no DVT  7. Bone Scan later today, evidence of osteolytic lesions concerning for bone metastasis  8. Cardiothoracic surgery  Input noted, will not recommend intervention, offered second opinion to patient. 9. Evidence of asbestos exposure with pleural plaques on CT scan.  Consider send out of previous pleural biopsy to dedicated pulmonary pathologist to evaluate for mesothelioma  This plan of care was reviewed in collaboration with  -low dose ISS before meals and at night  -A1C in the AM   -hold at home metformin and SGLT2 inhibitor  -repeat BMP in the AM to check anion gap On home metformin & SGLT2 inhibitor  - Continue ISS  - F/u A1C

## (undated) DEVICE — BLADE CLIPPER GEN PURP NS

## (undated) DEVICE — ELECTRODE PT RET AD L9FT HI MOIST COND ADH HYDRGEL CORDED

## (undated) DEVICE — GLOVE ORANGE PI 7 1/2   MSG9075

## (undated) DEVICE — LABEL MED 4 IN SURG PANEL W/ PEN STRL

## (undated) DEVICE — ELECTRODE BLDE L6.5IN CAUT EXT DISP

## (undated) DEVICE — SURGICAL PROCEDURE PACK BASIC

## (undated) DEVICE — DRAIN SURG SGL COLL PT TB FOR ATS BG OASIS

## (undated) DEVICE — INTENDED FOR TISSUE SEPARATION, AND OTHER PROCEDURES THAT REQUIRE A SHARP SURGICAL BLADE TO PUNCTURE OR CUT.: Brand: BARD-PARKER ® STAINLESS STEEL BLADES

## (undated) DEVICE — SOLUTION IV IRRIG POUR BRL 0.9% SODIUM CHL 2F7124

## (undated) DEVICE — 1.5L THIN WALL CAN: Brand: CRD

## (undated) DEVICE — GLOVE ORANGE PI 7   MSG9070

## (undated) DEVICE — Z CONVERTED USE 2275207 CLOTH PREP W7.5XL7.5IN 2% CHG SKIN ALC AND RNS FREE

## (undated) DEVICE — STANDARD HYPODERMIC NEEDLE,ALUMINUM HUB: Brand: MONOJECT

## (undated) DEVICE — NEEDLE SPNL 20GA L3.5IN YEL HUB S STL REG WALL FIT STYL W/

## (undated) DEVICE — SET CARDIAC CHEST II

## (undated) DEVICE — CHLORAPREP 26ML ORANGE

## (undated) DEVICE — SHEET,DRAPE,40X58,STERILE: Brand: MEDLINE

## (undated) DEVICE — PAD,NON-ADHERENT,2X3,STERILE,LF,1/PK: Brand: MEDLINE

## (undated) DEVICE — GOWN,SIRUS,FABRNF,XL,20/CS: Brand: MEDLINE

## (undated) DEVICE — GLOVE SURG SZ 65 THK91MIL LTX FREE SYN POLYISOPRENE

## (undated) DEVICE — CAMERA CARDIAC STRYKER 1488 HD

## (undated) DEVICE — TROCAR: Brand: KII FIOS FIRST ENTRY

## (undated) DEVICE — CAUTERY: TIP CLEANER XR 100/CS: Brand: MEDICAL ACTION INDUSTRIES

## (undated) DEVICE — SYRINGE MED 20ML STD CLR PLAS LUERLOCK TIP N CTRL DISP

## (undated) DEVICE — PACK,UNIV, II AURORA: Brand: MEDLINE

## (undated) DEVICE — TUBING, SUCTION, 3/16" X 12', STRAIGHT: Brand: MEDLINE

## (undated) DEVICE — Z INACTIVE USE 2641837 CLIP LIG M BLU TI HRT SHP WIRE HORZ 600 PER BX

## (undated) DEVICE — 3M™ IOBAN™ 2 ANTIMICROBIAL INCISE DRAPE 6650EZ: Brand: IOBAN™ 2

## (undated) DEVICE — ANTI-FOG SOLUTION WITH FOAM PAD: Brand: DEVON

## (undated) DEVICE — TRAP,MUCUS SPECIMEN,40CC: Brand: MEDLINE

## (undated) DEVICE — SOLUTION IV IRRIG WATER 1000ML POUR BRL 2F7114

## (undated) DEVICE — MARKER SKIN MINI PUR FINE REGULAR TIP W RUL XL PREP RESIST

## (undated) DEVICE — KIT SURG W7XL11IN 2 PKT UNTREATED NA

## (undated) DEVICE — KIT CATH PLEUREX 15.5FR

## (undated) DEVICE — WARMER SCP LAP

## (undated) DEVICE — TOWEL,OR,DSP,ST,BLUE,STD,6/PK,12PK/CS: Brand: MEDLINE

## (undated) DEVICE — SKIN AFFIX SURG ADHESIVE 72/CS 0.55ML: Brand: MEDLINE

## (undated) DEVICE — STERILE LATEX POWDER FREE SURGICAL GLOVES WITH HYDROGEL COATING: Brand: PROTEXIS

## (undated) DEVICE — BLADE ES L2.44IN S STL HEX LOK DISP VALLEYLAB

## (undated) DEVICE — CATHETER URETH 16FR RED RUB INTMIT ALL PURP 12 PER CA

## (undated) DEVICE — SET INSTRUMENTS VATS THORACOSCOPY

## (undated) DEVICE — GOWN,SIRUS,FABRNF,L,20/CS: Brand: MEDLINE

## (undated) DEVICE — SET CARDIAC CHEST I

## (undated) DEVICE — SYRINGE MED 50ML LUERLOCK TIP

## (undated) DEVICE — AGENT HEMSTAT W4XL8IN OXIDIZED REGENERATED CELOS ABSRB

## (undated) DEVICE — GRADUATE

## (undated) DEVICE — GARMENT,MEDLINE,DVT,INT,CALF,MED, GEN2: Brand: MEDLINE